# Patient Record
Sex: MALE | Race: ASIAN | NOT HISPANIC OR LATINO | Employment: FULL TIME | URBAN - METROPOLITAN AREA
[De-identification: names, ages, dates, MRNs, and addresses within clinical notes are randomized per-mention and may not be internally consistent; named-entity substitution may affect disease eponyms.]

---

## 2017-03-16 ENCOUNTER — OFFICE VISIT (OUTPATIENT)
Dept: VASCULAR LAB | Facility: MEDICAL CENTER | Age: 27
End: 2017-03-16
Attending: INTERNAL MEDICINE
Payer: COMMERCIAL

## 2017-03-16 VITALS
HEIGHT: 65 IN | HEART RATE: 65 BPM | BODY MASS INDEX: 45.32 KG/M2 | WEIGHT: 272 LBS | DIASTOLIC BLOOD PRESSURE: 73 MMHG | SYSTOLIC BLOOD PRESSURE: 130 MMHG

## 2017-03-16 DIAGNOSIS — G47.33 OSA (OBSTRUCTIVE SLEEP APNEA): ICD-10-CM

## 2017-03-16 DIAGNOSIS — I10 ESSENTIAL HYPERTENSION: ICD-10-CM

## 2017-03-16 DIAGNOSIS — M10.9 GOUT, UNSPECIFIED CAUSE, UNSPECIFIED CHRONICITY, UNSPECIFIED SITE: ICD-10-CM

## 2017-03-16 DIAGNOSIS — R73.01 IFG (IMPAIRED FASTING GLUCOSE): ICD-10-CM

## 2017-03-16 DIAGNOSIS — E78.5 DYSLIPIDEMIA: ICD-10-CM

## 2017-03-16 PROCEDURE — 99214 OFFICE O/P EST MOD 30 MIN: CPT | Performed by: INTERNAL MEDICINE

## 2017-03-16 PROCEDURE — 99212 OFFICE O/P EST SF 10 MIN: CPT

## 2017-03-16 RX ORDER — ATORVASTATIN CALCIUM 20 MG/1
TABLET, FILM COATED ORAL
Qty: 30 TAB | Refills: 11
Start: 2017-03-16 | End: 2017-03-19 | Stop reason: SDUPTHER

## 2017-03-16 ASSESSMENT — ENCOUNTER SYMPTOMS
BRUISES/BLEEDS EASILY: 0
FOCAL WEAKNESS: 0
MYALGIAS: 0
LOSS OF CONSCIOUSNESS: 0
PALPITATIONS: 0
MUSCULOSKELETAL NEGATIVE: 1
HEADACHES: 0
COUGH: 0
CLAUDICATION: 0
SHORTNESS OF BREATH: 0
SEIZURES: 0
DEPRESSION: 0

## 2017-03-16 NOTE — PROGRESS NOTES
Resistant Hypertension Follow Up Visit  March 16, 2017    Assessment / Plan:   1. Blood Pressure Control:  Office BP Goal Based on JNC8: <140/90  Not checking BP at home  Under good control in office  Under reasonable control on ABPM  - encouraged more home BP monitoring (large cuff)    2. Work up of Secondary Causes of Resistant Hypertension:   Renovascular HTN: Renal artery duplex with no evidence or MEGAN (2015), but may not be best test for potential FMD  Primary Aldosteronism: Excluded PRA/hilda 1.4/11.8 No adrenal adenoma noted on previous imaging  Thyroid Function: Excluded - TSH 1.19 1/2016  Obstructive Sleep Apnea: Postive - awaiting CPAP - encouraged to get started asap  Pheochromocytoma: Excluded 24 hour urine normal july 2014  Instrinsic renal disease - normal GFR and kidneys unremarkable appearing on sono  Coarctation - not seen on previous imaging/angiogram  Recommendations At This Visit:      - start cpap   - consider MRA renal arteries in future if BP becomes more difficult to control    3. Medication Use / Adherence:  Assessment: Completely compliant  Recommendations: Instructed to Continue Taking All Medications As Prescribed         4. End Organ Damage:   Left Ventricular Hypertrophy: Present on  Echocardiogram Date: 2015  Albuminuria: absent on Date: 1/25/2016 - 9.7 - recheck with next blood work  Renal Function: Chronic Kidney Disease  Stage 2 at worst (GFR >60)  Established Cardiovascular Disease: None    5. Lifestyle Recommendations:  Had been doing well previously, but considerable backsliding  Less exercise - been busy at work   Less attention to calorie restricted diet  Up about 25 pounds    6. Standard HTN Pharmacotherapy:  ACEI/ARB:  Consider adding ACE or ARB in future if BP not controlled  Thiazide Type Diuretic: Avoid given gout and hypokalemia  Calcium Channel Blocker (CCB): continue amlodipine 10 mg daily    7. Additional Agents:  Beta Blocker - continue carvedilol  Mineralocorticoid  Receptor Antagonist (MRA) - continue spironolactone 25 mg daily      8. Other CV Risk Factors:   Lipids - ASCVD risk >10%  LDLP and small LDLP well control  Total C a bit low  - decrease atorvastatin to 10 mg daily (1/2 tab)  - will follow lipid panel, liver function test in 6 months  Impaired Fasting Glucose - worsened with weight gain  - continue intensified TLC  - Follow sugar and A1C over time  - can consider metformin in the future (may also help LDL particle size and promote weight loss)    9. Other Issues:  Gout - no recent recurrence. 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 Ding.  LFTs elevated.  Is seeing GI. Has MRI ordered apparently.  Will defer all further w/u and management to GI and PCP    Studies Ordered At This Visit: None  Blood Work to Be Obtained Prior to Next Visit: As above prior to next visit    Follow Up: 6 months    Diagnosis:  1. Essential hypertension  MICROALBUMIN CREAT RATIO URINE    COMP METABOLIC PANEL    atorvastatin (LIPITOR) 20 MG Tab   2. Dyslipidemia  TSH    LIPOPROTEIN QT BLOOD BY NMR    COMP METABOLIC PANEL   3. Gout, unspecified cause, unspecified chronicity, unspecified site     4. GALILEA (obstructive sleep apnea)     5. IFG (impaired fasting glucose)  COMP METABOLIC PANEL    HEMOGLOBIN A1C        History of Present Illness:   Medina Gallo is a 25 y.o. male who was seen 3-10-16 for f/u of hypertension, dyslidipidemia, IFG, gout and GALILEA    Not checking BP out of office  Says has been fine at other office visits  Tolerating all medications  Good adherence  No stimulants, nsaids or other interfering substances  No myalgias on statin  Was diagnosed with galilea, but not yet started on cpap.  Less adherence with lifestyle mod  Never been on oral hypoglyecmics  No gout flares  Seeing GI regarding liver lesions - has mri  "scheduled for later this week    SOCIAL HISTORY  Diet - far less consistent  Weight - up 25 pounds  Exercise - no longer working with   No smoking      Review of Systems:   Review of Systems   Constitutional: Positive for malaise/fatigue.   Respiratory: Negative for cough and shortness of breath.    Cardiovascular: Negative for chest pain, palpitations, claudication and leg swelling.   Musculoskeletal: Negative.  Negative for myalgias.   Neurological: Negative for focal weakness, seizures, loss of consciousness and headaches.   Endo/Heme/Allergies: Does not bruise/bleed easily.   Psychiatric/Behavioral: Negative for depression.        Objective:   Allergies:  Review of patient's allergies indicates no known allergies.    Filed Vitals:    03/16/17 1147   BP: 130/73   Pulse: 65   Height: 1.651 m (5' 5\")   Weight: 123.378 kg (272 lb)     Body mass index is 45.26 kg/(m^2).      Physical Exam   Constitutional: He is oriented to person, place, and time. No distress.   Cardiovascular: Normal rate, regular rhythm, normal heart sounds and intact distal pulses.    No murmur heard.  Pulmonary/Chest: Breath sounds normal. No respiratory distress. He has no wheezes. He has no rales.   Musculoskeletal: He exhibits no edema.   Neurological: He is alert and oriented to person, place, and time. No cranial nerve deficit. Coordination normal.   Skin: He is not diaphoretic.   Psychiatric: He has a normal mood and affect. His behavior is normal.   Vitals reviewed.       Accessory Clinical Findings:   Echocardiogram:  Results for orders placed or performed during the hospital encounter of 07/28/15   ECHOCARDIOGRAM COMP W/O CONT   Result Value Ref Range    Eject.Frac. MOD BP 59.37     Eject.Frac. MOD 4C 62.42     Eject.Frac. MOD 2C 60.22       Lab Results   Component Value Date    CHOLSTRLTOT 91* 01/25/2016    CHOLSTRLTOT 84* 12/12/2015    LDL 28 12/12/2015    HDL 32* 01/25/2016    HDL 30* 12/12/2015    TRIGLYCERIDE 127 01/25/2016 "    TRIGLYCERIDE 132 12/12/2015    LDLPART 471 01/25/2016    LDLPART 20.5 01/25/2016    SMLLDL 230 01/25/2016    LHDLPART 2.6* 01/25/2016    LVLDLPT 8.7* 01/25/2016    LDLCHOL 34 01/25/2016    HDLSIZE 8.8* 01/25/2016    VLDLSIZE 53.5* 01/25/2016    HDLPART 28.1* 01/25/2016    LPIRSCORE 74* 01/25/2016      Lab Results   Component Value Date    PROTHROMBTM 12.9 06/08/2015    INR 0.95 06/08/2015       Lab Results   Component Value Date    HBA1C 5.2 12/12/2015      Lab Results   Component Value Date    SODIUM 134* 01/25/2016    POTASSIUM 3.8 01/25/2016    CHLORIDE 102 01/25/2016    CO2 23 01/25/2016    GLUCOSE 113* 01/25/2016    BUN 17 01/25/2016    CREATININE 1.15 01/25/2016     Lab Results   Component Value Date    ALDOSTERONE 11.8 01/25/2016      Lab Results   Component Value Date    URCREAT 121.10 01/25/2016    MICROALBUR 9.7 01/25/2016    MALBCRT 80* 01/25/2016    CTJDDLCH37H Not Applicable 07/28/2015    METANEPHUR 46 07/28/2015    METURVOL 43 07/28/2015     Lab Results   Component Value Date    STMTRPT  07/28/2015     CHECK PATIENT HISTORY - MISMATCH WITH PREVIOUS RACE    STMTRPT SINUS TACHYCARDIA [Remains] 07/28/2015    STMTRPT  07/28/2015     ABNORMAL T, CONSIDER ISCHEMIA, LATERAL LEADS [Now Present]    STMTRPT BORDERLINE PROLONGED QT INTERVAL [Insig. Chg.] 07/28/2015    STMTRPT SIGNIFICANT ECG CONTOUR CHANGES 07/28/2015     Multiple imaging studies and lab reports were available in EMR and reviewed at today's visit    Michael J Bloch, M.D.     Cc:   Syeda Elena

## 2017-03-19 DIAGNOSIS — I10 ESSENTIAL HYPERTENSION: ICD-10-CM

## 2017-03-21 RX ORDER — ATORVASTATIN CALCIUM 20 MG/1
TABLET, FILM COATED ORAL
Qty: 30 TAB | Refills: 11 | Status: SHIPPED | OUTPATIENT
Start: 2017-03-21 | End: 2018-05-04 | Stop reason: SDUPTHER

## 2017-05-28 DIAGNOSIS — I10 ESSENTIAL HYPERTENSION: ICD-10-CM

## 2017-05-30 RX ORDER — AMLODIPINE BESYLATE 10 MG/1
TABLET ORAL
Qty: 30 TAB | Refills: 12 | Status: SHIPPED | OUTPATIENT
Start: 2017-05-30 | End: 2018-07-25 | Stop reason: SDUPTHER

## 2017-09-09 ENCOUNTER — HOSPITAL ENCOUNTER (OUTPATIENT)
Dept: LAB | Facility: MEDICAL CENTER | Age: 27
End: 2017-09-09
Attending: INTERNAL MEDICINE
Payer: COMMERCIAL

## 2017-09-09 DIAGNOSIS — R73.01 IFG (IMPAIRED FASTING GLUCOSE): ICD-10-CM

## 2017-09-09 DIAGNOSIS — E78.5 DYSLIPIDEMIA: ICD-10-CM

## 2017-09-09 DIAGNOSIS — I10 ESSENTIAL HYPERTENSION: ICD-10-CM

## 2017-09-09 LAB
ALBUMIN SERPL BCP-MCNC: 4.3 G/DL (ref 3.2–4.9)
ALBUMIN/GLOB SERPL: 1.3 G/DL
ALP SERPL-CCNC: 45 U/L (ref 30–99)
ALT SERPL-CCNC: 45 U/L (ref 2–50)
ANION GAP SERPL CALC-SCNC: 10 MMOL/L (ref 0–11.9)
AST SERPL-CCNC: 49 U/L (ref 12–45)
BILIRUB SERPL-MCNC: 1.1 MG/DL (ref 0.1–1.5)
BUN SERPL-MCNC: 17 MG/DL (ref 8–22)
CALCIUM SERPL-MCNC: 9.4 MG/DL (ref 8.5–10.5)
CHLORIDE SERPL-SCNC: 103 MMOL/L (ref 96–112)
CO2 SERPL-SCNC: 23 MMOL/L (ref 20–33)
CREAT SERPL-MCNC: 1.12 MG/DL (ref 0.5–1.4)
CREAT UR-MCNC: 69.1 MG/DL
EST. AVERAGE GLUCOSE BLD GHB EST-MCNC: 143 MG/DL
GFR SERPL CREATININE-BSD FRML MDRD: >60 ML/MIN/1.73 M 2
GLOBULIN SER CALC-MCNC: 3.2 G/DL (ref 1.9–3.5)
GLUCOSE SERPL-MCNC: 120 MG/DL (ref 65–99)
HBA1C MFR BLD: 6.6 % (ref 0–5.6)
MICROALBUMIN UR-MCNC: 2.5 MG/DL
MICROALBUMIN/CREAT UR: 36 MG/G (ref 0–30)
POTASSIUM SERPL-SCNC: 3.9 MMOL/L (ref 3.6–5.5)
PROT SERPL-MCNC: 7.5 G/DL (ref 6–8.2)
SODIUM SERPL-SCNC: 136 MMOL/L (ref 135–145)
TSH SERPL DL<=0.005 MIU/L-ACNC: 0.94 UIU/ML (ref 0.3–3.7)

## 2017-09-09 PROCEDURE — 82570 ASSAY OF URINE CREATININE: CPT

## 2017-09-09 PROCEDURE — 80061 LIPID PANEL: CPT

## 2017-09-09 PROCEDURE — 84443 ASSAY THYROID STIM HORMONE: CPT

## 2017-09-09 PROCEDURE — 80053 COMPREHEN METABOLIC PANEL: CPT

## 2017-09-09 PROCEDURE — 82043 UR ALBUMIN QUANTITATIVE: CPT

## 2017-09-09 PROCEDURE — 36415 COLL VENOUS BLD VENIPUNCTURE: CPT

## 2017-09-09 PROCEDURE — 83704 LIPOPROTEIN BLD QUAN PART: CPT

## 2017-09-09 PROCEDURE — 83036 HEMOGLOBIN GLYCOSYLATED A1C: CPT

## 2017-09-14 LAB
CHOLEST SERPL-MCNC: 95 MG/DL
HDL PARTICAL NO Q4363: ABNORMAL
HDL SIZE Q4361: ABNORMAL NM
HDLC SERPL-MCNC: 27 MG/DL (ref 40–59)
HLD.LARGE SERPL-SCNC: ABNORMAL UMOL/L
L VLDL PART NO Q4357: ABNORMAL NMOL/L
LDL SERPL QN: ABNORMAL NM
LDL SERPL-SCNC: ABNORMAL NMOL/L
LDL SMALL SERPL-SCNC: ABNORMAL NMOL/L
LDLC SERPL CALC-MCNC: 34 MG/DL
TRIGL SERPL-MCNC: 168 MG/DL (ref 30–149)
VLDL SIZE Q4362: ABNORMAL

## 2017-09-15 ENCOUNTER — OFFICE VISIT (OUTPATIENT)
Dept: VASCULAR LAB | Facility: MEDICAL CENTER | Age: 27
End: 2017-09-15
Attending: INTERNAL MEDICINE
Payer: COMMERCIAL

## 2017-09-15 VITALS
HEIGHT: 65 IN | BODY MASS INDEX: 45.82 KG/M2 | HEART RATE: 69 BPM | WEIGHT: 275 LBS | SYSTOLIC BLOOD PRESSURE: 129 MMHG | DIASTOLIC BLOOD PRESSURE: 87 MMHG

## 2017-09-15 DIAGNOSIS — E11.9 TYPE 2 DIABETES MELLITUS WITHOUT COMPLICATION, WITHOUT LONG-TERM CURRENT USE OF INSULIN (HCC): ICD-10-CM

## 2017-09-15 DIAGNOSIS — I10 ESSENTIAL HYPERTENSION: ICD-10-CM

## 2017-09-15 DIAGNOSIS — E78.5 DYSLIPIDEMIA: ICD-10-CM

## 2017-09-15 PROCEDURE — 99212 OFFICE O/P EST SF 10 MIN: CPT | Performed by: NURSE PRACTITIONER

## 2017-09-15 PROCEDURE — 99213 OFFICE O/P EST LOW 20 MIN: CPT | Performed by: NURSE PRACTITIONER

## 2017-09-15 ASSESSMENT — ENCOUNTER SYMPTOMS
MYALGIAS: 0
COUGH: 0
LOSS OF CONSCIOUSNESS: 0
WEIGHT LOSS: 0
CLAUDICATION: 0
PALPITATIONS: 0
SHORTNESS OF BREATH: 0
NERVOUS/ANXIOUS: 0
DEPRESSION: 0
WHEEZING: 0
FOCAL WEAKNESS: 0
BRUISES/BLEEDS EASILY: 0
HEADACHES: 0

## 2017-09-15 NOTE — PROGRESS NOTES
" Resistant Hypertension Follow Up Visit  9/15/17    Assessment / Plan:   1. Blood Pressure Control:  Office BP Goal Based on JNC8: <140/90  Not checking BP at home  Under good control in office  Under reasonable control on ABPM  - encouraged more home BP monitoring (large cuff); states he will try and take it, \"at grocery stores.\"    2. Work up of Secondary Causes of Resistant Hypertension:   Renovascular HTN: Renal artery duplex with no evidence or MEGAN (2015), but may not be best test for potential FMD  Primary Aldosteronism: Excluded PRA/hilda 1.4/11.8 No adrenal adenoma noted on previous imaging  Thyroid Function: Excluded - TSH WNL 9/9/17  Obstructive Sleep Apnea: Postive - awaiting CPAP - has yet to get machine; pt states he may have to , \"repeat,\" sleep study  Pheochromocytoma: Excluded 24 hour urine normal July 2014  Instrinsic renal disease - normal GFR and kidneys unremarkable appearing on sono  Coarctation - not seen on previous imaging/angiogram  Recommendations At This Visit:      - start cpap   - consider MRA renal arteries in future if BP becomes more difficult to control    3. Medication Use / Adherence:  Assessment: Completely compliant  Recommendations: Instructed to Continue Taking All Medications As Prescribed- with the exception of adding metformin.         4. End Organ Damage:   Left Ventricular Hypertrophy: Present on  Echocardiogram Date: 2015  Albuminuria: improved 9/9/17: 36  Renal Function: Chronic Kidney Disease  Stage 2 at worst (GFR >60)  Established Cardiovascular Disease: None    5. Lifestyle Recommendations:  Had been doing well previously, but considerable backsliding  Less exercise - been busy at work, but joined a gym this month  Less attention to calorie restricted diet; less simple carbs  Up about 25 pounds over the last year    6. Standard HTN Pharmacotherapy:  ACEI/ARB:  Consider adding ACE or ARB in future if BP not controlled  Thiazide Type Diuretic: Avoid given gout and " hypokalemia  Calcium Channel Blocker (CCB): continue amlodipine 10 mg daily    7. Additional Agents:  Beta Blocker - continue carvedilol  Mineralocorticoid Receptor Antagonist (MRA) - continue spironolactone 25 mg daily      8. Other CV Risk Factors:   Lipids - ASCVD risk >10%  LDLP and small LDLP well control  Total C still low, but improved  - continue atorvastatin to 10 mg daily (1/2 tab)  - will follow lipid panel, liver function test in 6 months    Impaired Fasting Glucose, diabetic per A1C guidelines: 6.6- worsened with weight gain  - continue intensified TLC  - start metformin 500mg daily x1wk, then 500mg BID- test A1C, CMP in 3-6mo (may also promote wt loss and be beneficial for LDL particle size)    9. Other Issues:  Gout - no recent recurrence. avoid thiazide and loop diuretics - otherwise defer management to PCP  GALILEA -start cpap! 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 Ding.  LFTs previously elevated, now normal. Will defer all further w/u and management to GI and PCP    Studies Ordered At This Visit: None  Blood Work to Be Obtained Prior to Next Visit:  A1C, urine MA, CMP, NMR    Follow Up: 6 months    Diagnosis:  1. Type 2 diabetes mellitus without complication, without long-term current use of insulin (CMS-[facility])  metformin (GLUCOPHAGE) 500 MG Tab    HEMOGLOBIN A1C    MICROALBUMIN CREAT RATIO URINE   2. Dyslipidemia  LIPIDS, TOTAL, SERUM    NMR LIPO PROFILE   3. Essential hypertension  COMP METABOLIC PANEL    MICROALBUMIN CREAT RATIO URINE        History of Present Illness:   Medina Gallo is a 27 y.o. male who was seen 9/15/17 for f/u of hypertension, dyslidipidemia, IFG/DMII, gout and GALILEA    Not checking BP out of office  Says has been fine at other office visits and grocery stores  Tolerating all medications  Good adherence  No stimulants, nsaids or other interfering substances  No myalgias on statin  Was diagnosed  "with morgan, but not yet started on cpap- had f/u with pulm last wk.  May have to repeat sleep study  Less adherence with lifestyle mod, but motivated to lose wt- joined a gym this mo  Never been on oral hypoglyecmics; metformin started this visit  No gout flares  Seeing GI regarding liver lesions     SOCIAL HISTORY  Diet - far less consistent.  Will try low carb, low sugar  Weight - up 25 pounds over the last yr  Exercise - joined a gym, has been doing cardio/wt lifting 3x/wk  No smoking      Review of Systems:   Review of Systems   Constitutional: Positive for malaise/fatigue. Negative for weight loss.   Respiratory: Negative for cough, shortness of breath and wheezing.    Cardiovascular: Negative for chest pain, palpitations, claudication and leg swelling.   Musculoskeletal: Negative for myalgias.   Neurological: Negative for focal weakness, loss of consciousness and headaches.   Endo/Heme/Allergies: Does not bruise/bleed easily.   Psychiatric/Behavioral: Negative for depression. The patient is not nervous/anxious.         Objective:   Allergies:  Review of patient's allergies indicates no known allergies.    Vitals:    09/15/17 0824   BP: 129/87   Pulse: 69   Weight: 124.7 kg (275 lb)   Height: 1.651 m (5' 5\")     Body mass index is 45.76 kg/m².      Physical Exam   Constitutional: He is oriented to person, place, and time. He appears well-developed and well-nourished. No distress.   Cardiovascular: Normal rate, regular rhythm, normal heart sounds and intact distal pulses.    No murmur heard.  Pulmonary/Chest: Effort normal and breath sounds normal. No respiratory distress. He has no wheezes.   Musculoskeletal: Normal range of motion. He exhibits no edema.   Neurological: He is alert and oriented to person, place, and time.   Skin: Skin is warm and dry. He is not diaphoretic.   Psychiatric: He has a normal mood and affect. His behavior is normal.   Vitals reviewed.       Accessory Clinical Findings: "   Echocardiogram:  Results for orders placed or performed during the hospital encounter of 07/28/15   ECHOCARDIOGRAM COMP W/O CONT   Result Value Ref Range    Eject.Frac. MOD BP 59.37     Eject.Frac. MOD 4C 62.42     Eject.Frac. MOD 2C 60.22       Lab Results   Component Value Date    CHOLSTRLTOT 95 09/09/2017    CHOLSTRLTOT 84 (L) 12/12/2015    LDL 28 12/12/2015    HDL 27 (L) 09/09/2017    HDL 30 (A) 12/12/2015    TRIGLYCERIDE 168 (H) 09/09/2017    TRIGLYCERIDE 132 12/12/2015    LDLPART See Note 09/09/2017    LDLPART See Note 09/09/2017    SMLLDL See Note 09/09/2017    LHDLPART See Note 09/09/2017    LVLDLPT See Note 09/09/2017    LDLCHOL 34 09/09/2017    HDLSIZE See Note 09/09/2017    VLDLSIZE  09/09/2017      Comment:      INTERPRETIVE INFORMATION: VLDL Particle Size, NMR  Percentiles in Reference Population:  25th       50th       75th  44.3       46.7       50.2      HDLPART  09/09/2017      Comment:      INTERPRETIVE INFORMATION: HDL Particle Number, NMR  Percentiles in Reference Population:  25th       50th       75th  29.7       33.0       36.8      LPIRSCORE 74 (H) 01/25/2016           Lab Results   Component Value Date    HBA1C 6.6 (H) 09/09/2017      Lab Results   Component Value Date    SODIUM 136 09/09/2017    POTASSIUM 3.9 09/09/2017    CHLORIDE 103 09/09/2017    CO2 23 09/09/2017    GLUCOSE 120 (H) 09/09/2017    BUN 17 09/09/2017    CREATININE 1.12 09/09/2017     Lab Results   Component Value Date    ALDOSTERONE 11.8 01/25/2016      Lab Results   Component Value Date    URCREAT 69.10 09/09/2017    MICROALBUR 2.5 09/09/2017    MALBCRT 36 (H) 09/09/2017        Multiple imaging studies and lab reports were available in EMR and reviewed at today's visit    TIMBO Jacques.     Cc:   TATYANA Naidu

## 2017-10-06 ENCOUNTER — SLEEP CENTER VISIT (OUTPATIENT)
Dept: SLEEP MEDICINE | Facility: MEDICAL CENTER | Age: 27
End: 2017-10-06
Payer: COMMERCIAL

## 2017-10-06 VITALS
TEMPERATURE: 97.1 F | RESPIRATION RATE: 16 BRPM | HEIGHT: 65 IN | WEIGHT: 270 LBS | DIASTOLIC BLOOD PRESSURE: 84 MMHG | SYSTOLIC BLOOD PRESSURE: 126 MMHG | HEART RATE: 65 BPM | BODY MASS INDEX: 44.98 KG/M2 | OXYGEN SATURATION: 96 %

## 2017-10-06 DIAGNOSIS — E11.9 TYPE 2 DIABETES MELLITUS WITHOUT COMPLICATION, WITHOUT LONG-TERM CURRENT USE OF INSULIN (HCC): ICD-10-CM

## 2017-10-06 DIAGNOSIS — I51.7 LVH (LEFT VENTRICULAR HYPERTROPHY): ICD-10-CM

## 2017-10-06 DIAGNOSIS — I10 ESSENTIAL HYPERTENSION: ICD-10-CM

## 2017-10-06 DIAGNOSIS — G47.33 OBSTRUCTIVE SLEEP APNEA: ICD-10-CM

## 2017-10-06 PROCEDURE — 99214 OFFICE O/P EST MOD 30 MIN: CPT | Performed by: FAMILY MEDICINE

## 2017-10-06 NOTE — PATIENT INSTRUCTIONS
Sleep Apnea   Sleep apnea is a sleep disorder characterized by abnormal pauses in breathing while you sleep. When your breathing pauses, the level of oxygen in your blood decreases. This causes you to move out of deep sleep and into light sleep. As a result, your quality of sleep is poor, and the system that carries your blood throughout your body (cardiovascular system) experiences stress. If sleep apnea remains untreated, the following conditions can develop:  · High blood pressure (hypertension).  · Coronary artery disease.  · Inability to achieve or maintain an erection (impotence).  · Impairment of your thought process (cognitive dysfunction).  There are three types of sleep apnea:  1. Obstructive sleep apnea--Pauses in breathing during sleep because of a blocked airway.  2. Central sleep apnea--Pauses in breathing during sleep because the area of the brain that controls your breathing does not send the correct signals to the muscles that control breathing.  3. Mixed sleep apnea--A combination of both obstructive and central sleep apnea.  RISK FACTORS  The following risk factors can increase your risk of developing sleep apnea:  · Being overweight.  · Smoking.  · Having narrow passages in your nose and throat.  · Being of older age.  · Being male.  · Alcohol use.  · Sedative and tranquilizer use.  · Ethnicity. Among individuals younger than 35 years,  Americans are at increased risk of sleep apnea.  SYMPTOMS   · Difficulty staying asleep.  · Daytime sleepiness and fatigue.  · Loss of energy.  · Irritability.  · Loud, heavy snoring.  · Morning headaches.  · Trouble concentrating.  · Forgetfulness.  · Decreased interest in sex.  · Unexplained sleepiness.  DIAGNOSIS   In order to diagnose sleep apnea, your caregiver will perform a physical examination. A sleep study done in the comfort of your own home may be appropriate if you are otherwise healthy. Your caregiver may also recommend that you spend the  "night in a sleep lab. In the sleep lab, several monitors record information about your heart, lungs, and brain while you sleep. Your leg and arm movements and blood oxygen level are also recorded.  TREATMENT  The following actions may help to resolve mild sleep apnea:  · Sleeping on your side.    · Using a decongestant if you have nasal congestion.    · Avoiding the use of depressants, including alcohol, sedatives, and narcotics.    · Losing weight and modifying your diet if you are overweight.  There also are devices and treatments to help open your airway:  · Oral appliances. These are custom-made mouthpieces that shift your lower jaw forward and slightly open your bite. This opens your airway.  · Devices that create positive airway pressure. This positive pressure \"splints\" your airway open to help you breathe better during sleep. The following devices create positive airway pressure:  ¨ Continuous positive airway pressure (CPAP) device. The CPAP device creates a continuous level of air pressure with an air pump. The air is delivered to your airway through a mask while you sleep. This continuous pressure keeps your airway open.  ¨ Nasal expiratory positive airway pressure (EPAP) device. The EPAP device creates positive air pressure as you exhale. The device consists of single-use valves, which are inserted into each nostril and held in place by adhesive. The valves create very little resistance when you inhale but create much more resistance when you exhale. That increased resistance creates the positive airway pressure. This positive pressure while you exhale keeps your airway open, making it easier to breath when you inhale again.  ¨ Bilevel positive airway pressure (BPAP) device. The BPAP device is used mainly in patients with central sleep apnea. This device is similar to the CPAP device because it also uses an air pump to deliver continuous air pressure through a mask. However, with the BPAP machine, the " pressure is set at two different levels. The pressure when you exhale is lower than the pressure when you inhale.  · Surgery. Typically, surgery is only done if you cannot comply with less invasive treatments or if the less invasive treatments do not improve your condition. Surgery involves removing excess tissue in your airway to create a wider passage way.     This information is not intended to replace advice given to you by your health care provider. Make sure you discuss any questions you have with your health care provider.     Document Released: 12/08/2003 Document Revised: 01/08/2016 Document Reviewed: 04/25/2013  [employer] Interactive Patient Education ©2016 [employer]

## 2017-10-06 NOTE — PROGRESS NOTES
[facility] Follow Up Note     Date: 10/6/2017 / Time: 8:48 PM    Patient ID:   Name:             Medina Gallo   YOB: 1990  Age:                 27 y.o.  male   MRN:               [id number]      Thank you for requesting a sleep medicine consultation on Medina Gallo at the sleep center. He presents today with the chief complaints of GALILEA  follow up. Home sleep study from April 2016 indicated an AHI of 35 and low oxygen of 77 %. Titration study indicated successful titration to CPAP 7cm with resultant AHI 2.4 and normal oximetry.     HISTORY OF PRESENT ILLNESS:       Pt is currently not on PAP therapy. He goes to sleep around 10-11 pm  And on the weekends around 2 lalitha wakes up around 5 am and on thr weekend at 9 am. He is getting about 6 hrs of sleep on a good night and about 4 hr of sleep on a bad night. The bad nights are about 1 per month. He continues to snore., witnessed apneas and has occasional EDS.             REVIEW OF SYSTEMS:       Constitutional: Denies fevers, Denies weight changes  Eyes: Denies changes in vision, no eye pain  Ears/Nose/Throat/Mouth: Denies nasal congestion or sore throat   Cardiovascular: Denies chest pain or palpitations   Respiratory: Denies shortness of breath , Denies cough  Gastrointestinal/Hepatic: Denies abdominal pain, nausea, vomiting, diarrhea, constipation or GI bleeding   Genitourinary: Denies bladder dysfunction, dysuria or frequency  Musculoskeletal/Rheum: Denies  joint pain and swelling   Skin/Breast: Denies rash,   Neurological: Denies headache, confusion, memory loss or focal weakness/parasthesias  Psychiatric: denies mood disorder     Comprehensive review of systems form is reviewed with the patient and is attached in the EMR.     PMH:  has a past medical history of Dyslipidemia (12/2/2015); Fatty liver; Gout; Hypertension (2015); Liver mass; and Obese. He also has no past medical history of Allergy.  MEDS:   Current  "Outpatient Prescriptions:   •  metformin (GLUCOPHAGE) 500 MG Tab, Take 1 tab po q hs x 1wk, then 1 tab po BID, Disp: 60 Tab, Rfl: 6  •  amlodipine (NORVASC) 10 MG Tab, Take one tablet by mouth one time daily, Disp: 30 Tab, Rfl: 12  •  spironolactone (ALDACTONE) 25 MG Tab, Take one tablet by mouth one time daily, Disp: 30 Tab, Rfl: 11  •  carvedilol (COREG) 25 MG Tab, Take one tablet by mouth twice daily with meals, Disp: 60 Tab, Rfl: 11  •  atorvastatin (LIPITOR) 20 MG Tab, Take one tablet by mouth at bedtime, Disp: 30 Tab, Rfl: 11  ALLERGIES: No Known Allergies  SURGHX:   Past Surgical History:   Procedure Laterality Date   • HEPATIC ABLATION LAPAROSCOPIC  5/9/2013    Performed by Shai Ding M.D. at SURGERY [facility] ORS   • HEPATIC ABLATION LAPAROSCOPIC  11/19/2012    Performed by Shai Ding M.D. at SURGERY [facility] ORS   • RECOVERY  11/15/2012    Performed by [facility] at SURGERY SAME DAY [facility] ORS   • LIVER BIOPSY  11/9/12 and 11/15/12     SOCHX:  reports that he has never smoked. He has never used smokeless tobacco. He reports that he drinks alcohol. He reports that he does not use drugs..   FH:   Family History   Problem Relation Age of Onset   • Hypertension Mother    • Heart Disease Father    • Hypertension Father    • Hypertension Sister    • Heart Disease Sister          Physical Exam:  Vitals/ General Appearance:   Weight/BMI: Body mass index is 44.93 kg/m².  Blood pressure 126/84, pulse 65, temperature 36.2 °C (97.1 °F), resp. rate 16, height 1.651 m (5' 5\"), weight 122.5 kg (270 lb), SpO2 96 %.  Vitals:    10/06/17 0928   BP: 126/84   Pulse: 65   Resp: 16   Temp: 36.2 °C (97.1 °F)   SpO2: 96%   Weight: 122.5 kg (270 lb)   Height: 1.651 m (5' 5\")       Pt. is alert and oriented to time, place and person. Cooperative and in no apparent distress.       1. Head: Atraumatic, normocephalic.   2. Ears: Normal tympanic membrane and no discharge  3. Nose: No inferior " turbinate hypertophy, no septal deviation, no polyp.   4. Throat: Oropharynx appears crowded in that the palate is overhanging (Malam Kaye scale 4. uvula is large,   5. Neck: Supple. No thyromegaly  6. Chest: Trachea central, no spine deformity   7. Lungs auscultation: B/L good air entry, vesicular breath sounds, no adventitious sounds  8. Heart auscultation: 1st and 2nd heart sounds normal, regular rhythm. No appreciable murmur.  9. Abdomen: Soft, non tender, no organomegaly. Bowel sounds present  10. Extremities: No, no deformity, no clubbing, no pedal edema.  11. Skin: No rash  12. NEUROLOGICAL EXAMINATION: On neurological exam, the patient was alert and oriented x3. speech was clear and fluent without dysarthria. Motor exam revealed normal bulk, tone and strength 5/5, which was symmetrical in the upper and lower extremities bilaterally.        INVESTIGATIONS:     HST: Severe   obstructive sleep apnea was found. The AHI was 35.0, the AI was   1.6, and AHI was 33.5. The supine AHI was 45.7 and the nonsupine   AHI was 30.1. The obstructive apnea index was 1.3 and the central   apnea index was 0.2 the patient did not experience any   Cheyne-Roche respirations. The oxygen desaturation index was   32.1 the baseline saturation was 97%, the average saturation 91%,   and the lowest saturation was 77%. The oxygen saturation was less   than 90% for 34% of the recording    CPAP titration   AT CPAP 7 cm water the apnea hypopnea index was 2.4 events per hour with a   mean arterial oxygen saturation of 96% and a lowest saturation of   about 87%.  That step in the titration included 8 minutes of REM   sleep time and 4 hours and 24 minutes of non-REM sleep time and   also included time in the supine body position      ASSESSMENT AND PLAN     1.Obstructive Sleep Apnea (GALILEA).He  Is currently not on PAP therapy. The symptoms of excessive daytime, snoring and gasping persist.      The pathophysiology of GALILEA and the increased risk  of cardiovascular morbidity from untreated GALILEA is discussed in detail with the patient. He  also has HTN, heart disease which can be worsened by her GALILEA.     He is urged to avoid supine sleep, weight gain and alcoholic beverages since all of these can worsen GALILEA. He is cautioned against drowsy driving. If He feels sleepy while driving, He must pull over for a break/nap, rather than persist on the road, in the interest of He own safety and that of others on the road.   Plan   - Split night study is ordered since his study is more than a year old   -Previous HST and titration studies were reviewed and discussed with the pt    2. He also has HTN and DM. He is currently on metformin for the DM. Last A1C was 6.6 on 9/14/17. He is currently on amlodipine,carvedill and spironolactone for HTN. BP is well controlled on meds.    3. Regarding treatment of other past medical problems and general health maintenance,  He is urged to follow up with PCP.    F/U after split night study

## 2017-10-20 ENCOUNTER — OFFICE VISIT (OUTPATIENT)
Dept: URGENT CARE | Facility: CLINIC | Age: 27
End: 2017-10-20
Payer: COMMERCIAL

## 2017-10-20 VITALS
SYSTOLIC BLOOD PRESSURE: 132 MMHG | BODY MASS INDEX: 43.65 KG/M2 | DIASTOLIC BLOOD PRESSURE: 80 MMHG | WEIGHT: 262 LBS | HEART RATE: 65 BPM | TEMPERATURE: 97.6 F | OXYGEN SATURATION: 98 % | HEIGHT: 65 IN

## 2017-10-20 DIAGNOSIS — M10.9 ACUTE GOUT OF RIGHT ANKLE, UNSPECIFIED CAUSE: Primary | ICD-10-CM

## 2017-10-20 PROCEDURE — 99214 OFFICE O/P EST MOD 30 MIN: CPT | Performed by: NURSE PRACTITIONER

## 2017-10-20 RX ORDER — METHYLPREDNISOLONE 4 MG/1
4 TABLET ORAL DAILY
Qty: 1 KIT | Refills: 0 | Status: SHIPPED | OUTPATIENT
Start: 2017-10-20 | End: 2018-08-16

## 2017-10-20 ASSESSMENT — ENCOUNTER SYMPTOMS
FOCAL WEAKNESS: 0
NECK PAIN: 0
LOSS OF MOTION: 0
NAUSEA: 0
CHILLS: 0
NUMBNESS: 0
MYALGIAS: 0
INABILITY TO BEAR WEIGHT: 0
SENSORY CHANGE: 0
BACK PAIN: 0
MUSCLE WEAKNESS: 0
TINGLING: 0
VOMITING: 0
LOSS OF SENSATION: 0
FEVER: 0

## 2017-10-20 NOTE — PROGRESS NOTES
"Subjective:      Medina Gallo is a 27 y.o. male who presents with Foot Problem (X last night, Right foot pain, swelling, Hx of gout )            Medications, Allergies and Prior Medical Hx reviewed and updated in [facility].with patient/family today     Hx of gout, onset of right ankle pain this am, denies any trauma or precipitating incident.       Ankle Pain    The incident occurred 1 to 3 hours ago. The incident occurred at home. There was no injury mechanism. The pain is present in the right ankle. The quality of the pain is described as aching. The pain is at a severity of 6/10. The pain has been constant since onset. Pertinent negatives include no inability to bear weight, loss of motion, loss of sensation, muscle weakness, numbness or tingling. He reports no foreign bodies present. The symptoms are aggravated by movement, weight bearing and palpation. He has tried nothing for the symptoms. The treatment provided no relief.       Review of Systems   Constitutional: Negative for chills, fever and malaise/fatigue.   Gastrointestinal: Negative for nausea and vomiting.   Musculoskeletal: Positive for joint pain. Negative for back pain, myalgias and neck pain.   Skin: Negative for rash.   Neurological: Negative for tingling, sensory change, focal weakness and numbness.          Objective:     /80   Pulse 65   Temp 36.4 °C (97.6 °F)   Ht 1.651 m (5' 5\")   Wt 118.8 kg (262 lb)   SpO2 98%   BMI 43.60 kg/m²      Physical Exam   Constitutional: He appears well-developed and well-nourished. No distress.   HENT:   Head: Normocephalic and atraumatic.   Eyes: Conjunctivae are normal. Pupils are equal, round, and reactive to light.   Neck: Neck supple.   Cardiovascular: Normal rate.    Pulmonary/Chest: Effort normal. No respiratory distress.   Musculoskeletal:        Right ankle: He exhibits decreased range of motion and swelling. He exhibits no ecchymosis, no deformity, no laceration and normal pulse. " Tenderness. Medial malleolus tenderness found.        Feet:    Mild erythema and swelling, ttp over the medial malleolus    Neurological: He is alert.   Awake, alert, answering questions appropriately, moving all extremeties   Skin: Skin is warm and dry. No erythema.   Psychiatric: He has a normal mood and affect. His behavior is normal.   Vitals reviewed.              Assessment/Plan:       1. Acute gout of right ankle, unspecified cause  MethylPREDNISolone (MEDROL DOSEPAK) 4 MG Tablet Therapy Pack         Rest, Ice, Elevation, Ibuprofen,  Pt will go to the ER for worsening or changing symptoms as discussed,  Follow-up with your primary care provider or return here if not improving in 7 days   Discharge instructions discussed with pt/family who verbalize understanding and agreement with poc

## 2017-10-27 ENCOUNTER — SLEEP STUDY (OUTPATIENT)
Dept: SLEEP MEDICINE | Facility: MEDICAL CENTER | Age: 27
End: 2017-10-27
Attending: FAMILY MEDICINE
Payer: COMMERCIAL

## 2017-10-27 DIAGNOSIS — I51.7 LVH (LEFT VENTRICULAR HYPERTROPHY): ICD-10-CM

## 2017-10-27 DIAGNOSIS — I10 ESSENTIAL HYPERTENSION: ICD-10-CM

## 2017-10-27 DIAGNOSIS — G47.33 OBSTRUCTIVE SLEEP APNEA: ICD-10-CM

## 2017-10-27 DIAGNOSIS — E11.9 TYPE 2 DIABETES MELLITUS WITHOUT COMPLICATION, WITHOUT LONG-TERM CURRENT USE OF INSULIN (HCC): ICD-10-CM

## 2017-10-27 PROCEDURE — 95811 POLYSOM 6/>YRS CPAP 4/> PARM: CPT | Performed by: INTERNAL MEDICINE

## 2017-10-28 ENCOUNTER — OFFICE VISIT (OUTPATIENT)
Dept: URGENT CARE | Facility: CLINIC | Age: 27
End: 2017-10-28
Payer: COMMERCIAL

## 2017-10-28 VITALS
OXYGEN SATURATION: 96 % | BODY MASS INDEX: 43.65 KG/M2 | TEMPERATURE: 98.2 F | HEIGHT: 65 IN | RESPIRATION RATE: 16 BRPM | HEART RATE: 81 BPM | DIASTOLIC BLOOD PRESSURE: 88 MMHG | SYSTOLIC BLOOD PRESSURE: 130 MMHG | WEIGHT: 262 LBS

## 2017-10-28 DIAGNOSIS — M10.00 ACUTE IDIOPATHIC GOUT, UNSPECIFIED SITE: ICD-10-CM

## 2017-10-28 PROCEDURE — 99214 OFFICE O/P EST MOD 30 MIN: CPT | Performed by: PHYSICIAN ASSISTANT

## 2017-10-28 RX ORDER — DEXAMETHASONE 4 MG/1
8 TABLET ORAL DAILY
Qty: 6 TAB | Refills: 2 | Status: SHIPPED | OUTPATIENT
Start: 2017-10-28 | End: 2017-10-31

## 2017-10-28 ASSESSMENT — ENCOUNTER SYMPTOMS
JOINT SWELLING: 1
NUMBNESS: 0
SENSORY CHANGE: 0
FOCAL WEAKNESS: 1
WEAKNESS: 1

## 2017-10-28 NOTE — PROGRESS NOTES
Subjective:      Medina Gallo is a 27 y.o. male who presents with No chief complaint on file.            Other   This is a recurrent problem. The current episode started yesterday (gout sx). The problem occurs constantly. The problem has been unchanged. Associated symptoms include joint swelling and weakness. Pertinent negatives include no numbness. The symptoms are aggravated by bending and walking. He has tried rest for the symptoms. The treatment provided no relief.       Review of Systems   Musculoskeletal: Positive for joint pain and joint swelling.   Skin: Negative.    Neurological: Positive for focal weakness and weakness. Negative for sensory change and numbness.          Objective:     There were no vitals taken for this visit.     Physical Exam   Constitutional: He is oriented to person, place, and time. He appears well-developed and well-nourished. No distress.   Musculoskeletal: He exhibits edema and tenderness (mild r gr toe mtp tend/swell; pn dorsal foot/tib-talar area).   Neurological: He is alert and oriented to person, place, and time. No sensory deficit. He exhibits abnormal muscle tone. Gait abnormal. Coordination normal.   Skin: Skin is warm and dry. There is erythema.   Psychiatric: He has a normal mood and affect. His behavior is normal. Thought content normal.   Nursing note and vitals reviewed.              Assessment/Plan:     There are no diagnoses linked to this encounter.

## 2017-10-30 NOTE — PROCEDURES
CLINICAL COMMENTS:  The patient underwent a split night polysomnogram with a CPAP titration using the standard montage for measurement of parameters of sleep, respiratory events, movement abnormalities, heart rate and rhythm. A microphone was used to monitor snoring.    ANALYSIS: Testing began at 8:07pm.  The diagnostic recording time was 114.7 minutes with a sleep period of 110.1 minutes.  Total sleep time was 98.5 minutes with a sleep efficiency of 85.8%.  The sleep latency was 4.6 minutes, and REM latency was N/A minutes.  The patient had 22 arousals in total, for an arousal index of 13.4.        RESPIRATORY: The patient had 1 apneas in total.  Of these, 1 were obstructive apneas, and 0 were central apneas.  This resulted in an apnea index (AI) of 0.6.  The patient had 78 hypopneas in total, which resulted in a hypopnea index of 47.5.  The overall AHI was 48.1, while the AHI during REM was N/A.  The supine AHI = N/A.    OXIMETRY: Oxygen saturation monitoring showed a mean SpO2 of 93.8% for the diagnostic part of the study, with a minimum oxygen saturation of 89.0%.  Oxygen saturations were below 89% for 0.0% of sleep time.    CARDIAC: The highest heart rate for the first part of the study was 94.0 beats per minute.  The average heart rate during sleep was 73.3 bpm, while the highest heart rate was 91.0 bpm.    LIMB MOVEMENTS: There were a total of 9 periodic limb movements during sleep, of which 2 were PLMS arousals.  This resulted in a PLMS index of 5.5 and a PLMS arousal index of 1.2.    TREATMENT    Treatment recording time was 452.9 minutes with a total sleep time of 389.5min.  The patient had an arousal index of 5.4.      RESPIRATORY: The patient had 0 obstructive apneas, 3 central apneas, and 52 hypopneas for an overall AHI was 8.5.    OXIMETRY: The mean SpO2 during treatment was 94.0%, with a minimum oxygen saturation of 81.0%.        Interpretation:    This is a split-night study.    There is  fragmentation of sleep with a moderate elevation in the arousal and awakening index.  No REM sleep time is recorded.  There are periodic limb movements but they do not significantly affect sleep continuity.  The apnea hypopnea index is 48.1 events per hour, consisting almost exclusively of hypopnea episodes with a single obstructive apnea.  No supine sleep time is recorded.  The lowest arterial oxygen saturation is 89% on room air but he spends about 6% of the diagnostic time with a saturation below 90%.    In the treatment phase of the study there is again mild fragmentation of sleep, characterized by increased wake after sleep onset time.  The occasional periodic limb movements persist but are associated with significant sleep fragmentation.  CPAP was adjusted across a pressure range of 5-12 cm water.  Hypopnea events were suppressed in non-REM sleep at low pressures but the higher pressures were required to suppress residual events during REM sleep.  Occasional sleep onset central apnea episodes were noted.  In the final pressure stage, the apnea hypopnea index was 2.6 events per hour with a lowest arterial oxygen saturation of 90% on room air.  That stage in the titration included 62 minutes of REM sleep time, 120 minutes of non-REM sleep time, and time in the supine body position.    Assessment:  Severe obstructive sleep apnea hypopnea with an apnea hypopnea index of 48.1 events per hour and a lowest arterial oxygen saturation of 89% on room air.  Fragmentation of sleep related in part to the breathing events and probably to laboratory effect as well.  There are periodic limb movements but they do not significantly affect sleep continuity.  There is an excellent response to CPAP therapy.    Recommendations:  CPAP at 12 cm water pressure.  He did best with a large Simplus fullface mask.

## 2017-11-06 ENCOUNTER — OFFICE VISIT (OUTPATIENT)
Dept: PULMONOLOGY | Facility: HOSPICE | Age: 27
End: 2017-11-06
Payer: COMMERCIAL

## 2017-11-06 VITALS
BODY MASS INDEX: 44.32 KG/M2 | WEIGHT: 266 LBS | SYSTOLIC BLOOD PRESSURE: 116 MMHG | OXYGEN SATURATION: 80 % | RESPIRATION RATE: 16 BRPM | HEART RATE: 94 BPM | HEIGHT: 65 IN | DIASTOLIC BLOOD PRESSURE: 74 MMHG

## 2017-11-06 DIAGNOSIS — G47.33 OBSTRUCTIVE SLEEP APNEA: ICD-10-CM

## 2017-11-06 DIAGNOSIS — I10 ESSENTIAL HYPERTENSION: ICD-10-CM

## 2017-11-06 PROCEDURE — 99213 OFFICE O/P EST LOW 20 MIN: CPT | Performed by: NURSE PRACTITIONER

## 2017-11-06 NOTE — PROGRESS NOTES
Chief Complaint   Patient presents with   • Apnea     Sleep Study Results       HPI:  Medina Gallo is a 27 y.o. year old male here today for follow-up on his obstructive sleep apnea. He has a history of severe sleep apnea with sleep study in 2015 indicating an AHI of 35 and a low 02 saturation of 77%. He was titrated to CPAP of 7 CM H20 with an AHI of 2.4 and normal oximetry. He was never started on CPAP therapy. He continues to snore, have witnessed apneas and non restorative sleep. He denies morning headaches. His BMI is 44.2. He has a history of Hypertension and is on medication. He was recommended a repeat sleep study to re qualify for CPAP. He underwent an in lab Polysomnogram 10/27/2017 which indicates evidence of severe obstructive sleep apnea with an AHI of 48.1 with a low 02 saturation of 88%. He was successfully titrated to CPAP of 12 CM H20 with a resultant AHI of 2.6 and a low 02 saturation of 90%. He had an hour of REM sleep on this setting.       Past Medical History:   Diagnosis Date   • Dyslipidemia 12/2/2015   • Fatty liver    • Gout    • Hypertension 2015   • Liver mass    • Obese        Past Surgical History:   Procedure Laterality Date   • HEPATIC ABLATION LAPAROSCOPIC  5/9/2013    Performed by Shai Ding M.D. at SURGERY [facility]   • HEPATIC ABLATION LAPAROSCOPIC  11/19/2012    Performed by Shai Ding M.D. at SURGERY [facility] ORS   • RECOVERY  11/15/2012    Performed by -Recovery Surgery at SURGERY SAME DAY [facility] ORS   • LIVER BIOPSY  11/9/12 and 11/15/12       Family History   Problem Relation Age of Onset   • Hypertension Mother    • Heart Disease Father    • Hypertension Father    • Hypertension Sister    • Heart Disease Sister        Social History     Social History   • Marital status: Single     Spouse name: N/A   • Number of children: N/A   • Years of education: N/A     Occupational History   • Not on file.     Social History Main  "Topics   • Smoking status: Never Smoker   • Smokeless tobacco: Never Used   • Alcohol use 0.0 oz/week      Comment: rarely   • Drug use: No   • Sexual activity: Not on file     Other Topics Concern   • Not on file     Social History Narrative   • No narrative on file     ROS:  Constitutional: Denies fevers, chills, sweats, weight loss  Eyes: Denies glasses, vision loss, pain, drainage, double vision  Ears/Nose/Mouth/Throat: Denies rhinitis, nasal congestion, ear ache, difficulty hearing, sore throat, persistent hoarseness, decayed teeth/toothache  Cardiovascular: Denies chest pain, tightness, palpitations, swelling in feet/legs, fainting, difficulty breathing when laying down  Respiratory: Denies shortness of breath, cough, sputum, wheezing, painful breathing, coughing up blood  GI: Denies heartburn, difficulty swallowing, nausea, vomiting, abdominal pain, diarrhea, constipation  : Denies frequent urination, painful urination  Integumentary: Denies rashes, lumps or color changes  MSK: Denies painful joints, sore muscles, and back pain.   Neurological: Denies frequent headaches, dizziness, weakness  Sleep: See HPI       Current Outpatient Prescriptions   Medication Sig Dispense Refill   • MethylPREDNISolone (MEDROL DOSEPAK) 4 MG Tablet Therapy Pack Take 1 Tab by mouth every day. 1 Kit 0   • metformin (GLUCOPHAGE) 500 MG Tab Take 1 tab po q hs x 1wk, then 1 tab po BID 60 Tab 6   • amlodipine (NORVASC) 10 MG Tab Take one tablet by mouth one time daily 30 Tab 12   • spironolactone (ALDACTONE) 25 MG Tab Take one tablet by mouth one time daily 30 Tab 11   • carvedilol (COREG) 25 MG Tab Take one tablet by mouth twice daily with meals 60 Tab 11   • atorvastatin (LIPITOR) 20 MG Tab Take one tablet by mouth at bedtime 30 Tab 11     No current facility-administered medications for this visit.        No Known Allergies    Blood pressure 116/74, pulse 94, resp. rate 16, height 1.651 m (5' 5\"), weight 120.7 kg (266 lb), SpO2 " (!) 80 %.    PE:   Appearance: Well developed, well nourished, no acute distress  Eyes: PERRL, EOM intact, sclera white, conjunctiva moist  Ears: no lesions or deformities  Hearing: grossly intact  Nose: no lesions or deformities  Oropharynx: tongue normal, posterior pharynx without erythema or exudate  Mallampati Classification: Class 4  Neck: supple, trachea midline, no masses   Respiratory effort: no intercostal retractions or use of accessory muscles  Lung auscultation: no rales, rhonchi or wheezes  Heart auscultation: no murmur rub or gallop  Extremities: no cyanosis or edema  Abdomen: soft ,non tender, no masses  Gait and Station: normal  Digits and nails: no clubbing, cyanosis, petechiae or nodes.  Cranial nerves: grossly intact  Skin: no rashes, lesions or ulcers noted  Orientation: Oriented to time, person and place  Mood and affect: mood and affect appropriate, normal interaction with examiner  Judgement: Intact          Assessment:  1. Obstructive sleep apnea     2. BMI 40.0-44.9, adult (CMS-[facility])     3. Essential hypertension           Plan:      1) Reviewed sleep study in detail. Discussed pathophysiology of sleep apnea and various treatment options in detail. He is amendable to a trial of airway pressurization. Order for CPAP at 12 CM H20. Handout provided on sleep apnea  2) Sleep hygiene discussed.   3) Weight loss recommended.   4) Follow up in 6-8 weeks with compliance card download, sooner if needed.

## 2017-11-06 NOTE — PATIENT INSTRUCTIONS
Sleep Apnea   Sleep apnea is a sleep disorder characterized by abnormal pauses in breathing while you sleep. When your breathing pauses, the level of oxygen in your blood decreases. This causes you to move out of deep sleep and into light sleep. As a result, your quality of sleep is poor, and the system that carries your blood throughout your body (cardiovascular system) experiences stress. If sleep apnea remains untreated, the following conditions can develop:  · High blood pressure (hypertension).  · Coronary artery disease.  · Inability to achieve or maintain an erection (impotence).  · Impairment of your thought process (cognitive dysfunction).  There are three types of sleep apnea:  1. Obstructive sleep apnea--Pauses in breathing during sleep because of a blocked airway.  2. Central sleep apnea--Pauses in breathing during sleep because the area of the brain that controls your breathing does not send the correct signals to the muscles that control breathing.  3. Mixed sleep apnea--A combination of both obstructive and central sleep apnea.  RISK FACTORS  The following risk factors can increase your risk of developing sleep apnea:  · Being overweight.  · Smoking.  · Having narrow passages in your nose and throat.  · Being of older age.  · Being male.  · Alcohol use.  · Sedative and tranquilizer use.  · Ethnicity. Among individuals younger than 35 years,  Americans are at increased risk of sleep apnea.  SYMPTOMS   · Difficulty staying asleep.  · Daytime sleepiness and fatigue.  · Loss of energy.  · Irritability.  · Loud, heavy snoring.  · Morning headaches.  · Trouble concentrating.  · Forgetfulness.  · Decreased interest in sex.  · Unexplained sleepiness.  DIAGNOSIS   In order to diagnose sleep apnea, your caregiver will perform a physical examination. A sleep study done in the comfort of your own home may be appropriate if you are otherwise healthy. Your caregiver may also recommend that you spend the  "night in a sleep lab. In the sleep lab, several monitors record information about your heart, lungs, and brain while you sleep. Your leg and arm movements and blood oxygen level are also recorded.  TREATMENT  The following actions may help to resolve mild sleep apnea:  · Sleeping on your side.    · Using a decongestant if you have nasal congestion.    · Avoiding the use of depressants, including alcohol, sedatives, and narcotics.    · Losing weight and modifying your diet if you are overweight.  There also are devices and treatments to help open your airway:  · Oral appliances. These are custom-made mouthpieces that shift your lower jaw forward and slightly open your bite. This opens your airway.  · Devices that create positive airway pressure. This positive pressure \"splints\" your airway open to help you breathe better during sleep. The following devices create positive airway pressure:  ¨ Continuous positive airway pressure (CPAP) device. The CPAP device creates a continuous level of air pressure with an air pump. The air is delivered to your airway through a mask while you sleep. This continuous pressure keeps your airway open.  ¨ Nasal expiratory positive airway pressure (EPAP) device. The EPAP device creates positive air pressure as you exhale. The device consists of single-use valves, which are inserted into each nostril and held in place by adhesive. The valves create very little resistance when you inhale but create much more resistance when you exhale. That increased resistance creates the positive airway pressure. This positive pressure while you exhale keeps your airway open, making it easier to breath when you inhale again.  ¨ Bilevel positive airway pressure (BPAP) device. The BPAP device is used mainly in patients with central sleep apnea. This device is similar to the CPAP device because it also uses an air pump to deliver continuous air pressure through a mask. However, with the BPAP machine, the " pressure is set at two different levels. The pressure when you exhale is lower than the pressure when you inhale.  · Surgery. Typically, surgery is only done if you cannot comply with less invasive treatments or if the less invasive treatments do not improve your condition. Surgery involves removing excess tissue in your airway to create a wider passage way.     This information is not intended to replace advice given to you by your health care provider. Make sure you discuss any questions you have with your health care provider.     Document Released: 12/08/2003 Document Revised: 01/08/2016 Document Reviewed: 04/25/2013  [employer] Interactive Patient Education ©2016 [employer]

## 2017-11-30 ENCOUNTER — TELEPHONE (OUTPATIENT)
Dept: SLEEP MEDICINE | Facility: MEDICAL CENTER | Age: 27
End: 2017-11-30

## 2017-11-30 NOTE — TELEPHONE ENCOUNTER
Received call from Bienvenido Nicole @ [employer] in [place], [place]  Phone [phone] ext [phone]  Fax [phone]  Re:  Gabriel Gallo  ID:  [id number]    Patient's insurance company has record of paying for a CPAP device a year ago and patient is not eligible.  Researched chart and advised that we have no record that the patient ever picked up the machine and/or began PAP therapy.    Bienvenido will f/up with insurance, contact patient and the previous DME company to clarify if indeed therapy was ever initiated.

## 2018-01-11 ENCOUNTER — OFFICE VISIT (OUTPATIENT)
Dept: URGENT CARE | Facility: CLINIC | Age: 28
End: 2018-01-11
Payer: COMMERCIAL

## 2018-01-11 VITALS
SYSTOLIC BLOOD PRESSURE: 122 MMHG | HEIGHT: 65 IN | BODY MASS INDEX: 43.99 KG/M2 | TEMPERATURE: 98 F | WEIGHT: 264 LBS | RESPIRATION RATE: 16 BRPM | HEART RATE: 74 BPM | OXYGEN SATURATION: 97 % | DIASTOLIC BLOOD PRESSURE: 80 MMHG

## 2018-01-11 DIAGNOSIS — M10.071 ACUTE IDIOPATHIC GOUT OF RIGHT FOOT: ICD-10-CM

## 2018-01-11 PROCEDURE — 99214 OFFICE O/P EST MOD 30 MIN: CPT | Performed by: NURSE PRACTITIONER

## 2018-01-11 RX ORDER — METHYLPREDNISOLONE 4 MG/1
TABLET ORAL
Qty: 1 KIT | Refills: 0 | Status: SHIPPED | OUTPATIENT
Start: 2018-01-11 | End: 2018-08-16

## 2018-01-11 ASSESSMENT — ENCOUNTER SYMPTOMS
WEAKNESS: 0
TINGLING: 1
MYALGIAS: 0
FOCAL WEAKNESS: 0
SENSORY CHANGE: 0
HEADACHES: 0
CHILLS: 0
FEVER: 0

## 2018-01-11 NOTE — PROGRESS NOTES
"Subjective:      Medina Gallo is a 27 y.o. male who presents with Foot Pain (x last night, Rt. foot pain, swelling and redness  \"had gout before\")            HPI New problem. 27 year old male with right foot pain since last night. Had worse pain this morning that has improved over time. Located in first metatarsal joint. No redness. Is not a beer drinker but eats red meat. 2 episodes of this in October as well. He has not taken any medication for this this morning. No fever, chills or other joint pain. No paresthesia or weakness.  Patient has no known allergies.  Current Outpatient Prescriptions on File Prior to Visit   Medication Sig Dispense Refill   • metformin (GLUCOPHAGE) 500 MG Tab Take 1 tab po q hs x 1wk, then 1 tab po BID 60 Tab 6   • amlodipine (NORVASC) 10 MG Tab Take one tablet by mouth one time daily 30 Tab 12   • spironolactone (ALDACTONE) 25 MG Tab Take one tablet by mouth one time daily 30 Tab 11   • carvedilol (COREG) 25 MG Tab Take one tablet by mouth twice daily with meals 60 Tab 11   • atorvastatin (LIPITOR) 20 MG Tab Take one tablet by mouth at bedtime 30 Tab 11   • MethylPREDNISolone (MEDROL DOSEPAK) 4 MG Tablet Therapy Pack Take 1 Tab by mouth every day. (Patient not taking: Reported on 1/11/2018) 1 Kit 0     No current facility-administered medications on file prior to visit.      Social History     Social History   • Marital status: Single     Spouse name: N/A   • Number of children: N/A   • Years of education: N/A     Occupational History   • Not on file.     Social History Main Topics   • Smoking status: Never Smoker   • Smokeless tobacco: Never Used   • Alcohol use 0.0 oz/week      Comment: rarely   • Drug use: No   • Sexual activity: Not on file     Other Topics Concern   • Not on file     Social History Narrative   • No narrative on file     family history includes Heart Disease in his father and sister; Hypertension in his father, mother, and sister.      Review of Systems " "  Constitutional: Negative for chills and fever.   Musculoskeletal: Positive for joint pain. Negative for myalgias.   Skin: Negative for itching and rash.   Neurological: Positive for tingling. Negative for sensory change, focal weakness, weakness and headaches.          Objective:     /80   Pulse 74   Temp 36.7 °C (98 °F)   Resp 16   Ht 1.651 m (5' 5\")   Wt 119.7 kg (264 lb)   SpO2 97%   BMI 43.93 kg/m²      Physical Exam   Constitutional: He is oriented to person, place, and time. He appears well-developed and well-nourished. No distress.   Cardiovascular: Normal rate, regular rhythm and normal heart sounds.    No murmur heard.  Pulmonary/Chest: Breath sounds normal. No respiratory distress.   Musculoskeletal: Normal range of motion.        Right foot: There is tenderness, bony tenderness and swelling.        Feet:    Neurological: He is alert and oriented to person, place, and time.   Skin: Skin is warm and dry. No erythema.   Nursing note and vitals reviewed.              Assessment/Plan:     1. Acute idiopathic gout of right foot  MethylPREDNISolone (MEDROL DOSEPAK) 4 MG Tablet Therapy Pack     Discussed his now multiple episodes. If this continues he may be candidate for preventive therapy.  Differential diagnosis, natural history, supportive care, and indications for immediate follow-up discussed at length.     "

## 2018-01-20 ENCOUNTER — TELEMEDICINE2 (OUTPATIENT)
Dept: URGENT CARE | Facility: CLINIC | Age: 28
End: 2018-01-20
Payer: COMMERCIAL

## 2018-01-20 DIAGNOSIS — M10.00 ACUTE IDIOPATHIC GOUT, UNSPECIFIED SITE: ICD-10-CM

## 2018-01-20 PROCEDURE — 99213 OFFICE O/P EST LOW 20 MIN: CPT | Performed by: NURSE PRACTITIONER

## 2018-01-20 RX ORDER — DEXAMETHASONE 4 MG/1
8 TABLET ORAL DAILY
Qty: 6 TAB | Refills: 0 | Status: SHIPPED | OUTPATIENT
Start: 2018-01-20 | End: 2018-01-23

## 2018-01-20 NOTE — PROGRESS NOTES
Subjective:      Medina Gallo is a 27 y.o. male who presents with Gout (x 1 week / )    Past Medical History:   Diagnosis Date   • Dyslipidemia 12/2/2015   • Fatty liver    • Gout    • Hypertension 2015   • Liver mass    • Obese      Social History     Social History   • Marital status: Single     Spouse name: N/A   • Number of children: N/A   • Years of education: N/A     Occupational History   • Not on file.     Social History Main Topics   • Smoking status: Never Smoker   • Smokeless tobacco: Never Used   • Alcohol use 0.0 oz/week      Comment: rarely   • Drug use: No   • Sexual activity: Not on file     Other Topics Concern   • Not on file     Social History Narrative   • No narrative on file     Family History   Problem Relation Age of Onset   • Hypertension Mother    • Heart Disease Father    • Hypertension Father    • Hypertension Sister    • Heart Disease Sister        Allergies: Patient has no known allergies.    Patient presents with complaint over the 1st MTP joint of the right foot. Started over the last week.  Patient states he has a history of gout.  During the last flare he took a regimen of dexamethasone which has states has helped more than anything else. He is requesting that today. He brought the pill bottle with him from the prior prescription.        Other   This is a recurrent problem. The current episode started in the past 7 days. The problem occurs constantly. The problem has been unchanged. Nothing aggravates the symptoms. He has tried NSAIDs and rest for the symptoms. The treatment provided no relief.       Review of Systems   Musculoskeletal:        Right foot pain and swelling   All other systems reviewed and are negative.         Objective:     There were no vitals taken for this visit.     Physical Exam   Constitutional: He is oriented to person, place, and time. He appears well-developed and well-nourished. No distress.   Musculoskeletal:        Feet:    Point tenderness  over the 1st MTP joint and redness/swelling.   Neurological: He is alert and oriented to person, place, and time.   Skin: Skin is warm and dry. Capillary refill takes less than 2 seconds. He is not diaphoretic.   Psychiatric: He has a normal mood and affect. His behavior is normal. Judgment and thought content normal.   Vitals reviewed.              Assessment/Plan:     1. Acute idiopathic gout, unspecified site    - dexamethasone (DECADRON) 4 MG Tab; Take 2 Tabs by mouth every day for 3 days.  Dispense: 6 Tab; Refill: 0  -Elevate  -rest  -crutches PRN (patient already has)   -follow up if symptoms persist or worsen

## 2018-01-30 ENCOUNTER — APPOINTMENT (OUTPATIENT)
Dept: SLEEP MEDICINE | Facility: MEDICAL CENTER | Age: 28
End: 2018-01-30
Payer: COMMERCIAL

## 2018-02-13 ENCOUNTER — APPOINTMENT (OUTPATIENT)
Dept: RADIOLOGY | Facility: MEDICAL CENTER | Age: 28
End: 2018-02-13
Attending: EMERGENCY MEDICINE
Payer: COMMERCIAL

## 2018-02-13 ENCOUNTER — HOSPITAL ENCOUNTER (EMERGENCY)
Facility: MEDICAL CENTER | Age: 28
End: 2018-02-13
Attending: EMERGENCY MEDICINE
Payer: COMMERCIAL

## 2018-02-13 VITALS
HEART RATE: 87 BPM | BODY MASS INDEX: 43.09 KG/M2 | SYSTOLIC BLOOD PRESSURE: 142 MMHG | HEIGHT: 65 IN | OXYGEN SATURATION: 99 % | RESPIRATION RATE: 16 BRPM | WEIGHT: 258.6 LBS | TEMPERATURE: 97.8 F | DIASTOLIC BLOOD PRESSURE: 78 MMHG

## 2018-02-13 DIAGNOSIS — R10.13 ACUTE EPIGASTRIC PAIN: ICD-10-CM

## 2018-02-13 LAB
ALBUMIN SERPL BCP-MCNC: 4.1 G/DL (ref 3.2–4.9)
ALBUMIN/GLOB SERPL: 1.4 G/DL
ALP SERPL-CCNC: 68 U/L (ref 30–99)
ALT SERPL-CCNC: 84 U/L (ref 2–50)
ANION GAP SERPL CALC-SCNC: 10 MMOL/L (ref 0–11.9)
APTT PPP: 31.4 SEC (ref 24.7–36)
AST SERPL-CCNC: 116 U/L (ref 12–45)
BASOPHILS # BLD AUTO: 0.3 % (ref 0–1.8)
BASOPHILS # BLD: 0.03 K/UL (ref 0–0.12)
BILIRUB SERPL-MCNC: 1.3 MG/DL (ref 0.1–1.5)
BNP SERPL-MCNC: 8 PG/ML (ref 0–100)
BUN SERPL-MCNC: 10 MG/DL (ref 8–22)
CALCIUM SERPL-MCNC: 9.6 MG/DL (ref 8.5–10.5)
CHLORIDE SERPL-SCNC: 104 MMOL/L (ref 96–112)
CO2 SERPL-SCNC: 24 MMOL/L (ref 20–33)
CREAT SERPL-MCNC: 1.07 MG/DL (ref 0.5–1.4)
DEPRECATED D DIMER PPP IA-ACNC: <200 NG/ML(D-DU)
EKG IMPRESSION: NORMAL
EOSINOPHIL # BLD AUTO: 0.19 K/UL (ref 0–0.51)
EOSINOPHIL NFR BLD: 1.9 % (ref 0–6.9)
ERYTHROCYTE [DISTWIDTH] IN BLOOD BY AUTOMATED COUNT: 39.5 FL (ref 35.9–50)
GLOBULIN SER CALC-MCNC: 2.9 G/DL (ref 1.9–3.5)
GLUCOSE SERPL-MCNC: 135 MG/DL (ref 65–99)
HCT VFR BLD AUTO: 43.1 % (ref 42–52)
HGB BLD-MCNC: 14.9 G/DL (ref 14–18)
IMM GRANULOCYTES # BLD AUTO: 0.1 K/UL (ref 0–0.11)
IMM GRANULOCYTES NFR BLD AUTO: 1 % (ref 0–0.9)
INR PPP: 0.97 (ref 0.87–1.13)
LIPASE SERPL-CCNC: 37 U/L (ref 11–82)
LYMPHOCYTES # BLD AUTO: 1.3 K/UL (ref 1–4.8)
LYMPHOCYTES NFR BLD: 13 % (ref 22–41)
MCH RBC QN AUTO: 30.2 PG (ref 27–33)
MCHC RBC AUTO-ENTMCNC: 34.6 G/DL (ref 33.7–35.3)
MCV RBC AUTO: 87.2 FL (ref 81.4–97.8)
MONOCYTES # BLD AUTO: 0.72 K/UL (ref 0–0.85)
MONOCYTES NFR BLD AUTO: 7.2 % (ref 0–13.4)
NEUTROPHILS # BLD AUTO: 7.65 K/UL (ref 1.82–7.42)
NEUTROPHILS NFR BLD: 76.6 % (ref 44–72)
NRBC # BLD AUTO: 0 K/UL
NRBC BLD-RTO: 0 /100 WBC
PLATELET # BLD AUTO: 255 K/UL (ref 164–446)
PMV BLD AUTO: 8.7 FL (ref 9–12.9)
POTASSIUM SERPL-SCNC: 3.7 MMOL/L (ref 3.6–5.5)
PROT SERPL-MCNC: 7 G/DL (ref 6–8.2)
PROTHROMBIN TIME: 12.6 SEC (ref 12–14.6)
RBC # BLD AUTO: 4.94 M/UL (ref 4.7–6.1)
SODIUM SERPL-SCNC: 138 MMOL/L (ref 135–145)
TROPONIN I SERPL-MCNC: <0.01 NG/ML (ref 0–0.04)
WBC # BLD AUTO: 10 K/UL (ref 4.8–10.8)

## 2018-02-13 PROCEDURE — 80053 COMPREHEN METABOLIC PANEL: CPT

## 2018-02-13 PROCEDURE — 85730 THROMBOPLASTIN TIME PARTIAL: CPT

## 2018-02-13 PROCEDURE — 85025 COMPLETE CBC W/AUTO DIFF WBC: CPT

## 2018-02-13 PROCEDURE — 85379 FIBRIN DEGRADATION QUANT: CPT

## 2018-02-13 PROCEDURE — 93005 ELECTROCARDIOGRAM TRACING: CPT

## 2018-02-13 PROCEDURE — A9270 NON-COVERED ITEM OR SERVICE: HCPCS | Performed by: EMERGENCY MEDICINE

## 2018-02-13 PROCEDURE — 83880 ASSAY OF NATRIURETIC PEPTIDE: CPT

## 2018-02-13 PROCEDURE — 93005 ELECTROCARDIOGRAM TRACING: CPT | Performed by: EMERGENCY MEDICINE

## 2018-02-13 PROCEDURE — 700102 HCHG RX REV CODE 250 W/ 637 OVERRIDE(OP): Performed by: EMERGENCY MEDICINE

## 2018-02-13 PROCEDURE — 85610 PROTHROMBIN TIME: CPT

## 2018-02-13 PROCEDURE — 99284 EMERGENCY DEPT VISIT MOD MDM: CPT

## 2018-02-13 PROCEDURE — 84484 ASSAY OF TROPONIN QUANT: CPT

## 2018-02-13 PROCEDURE — 71045 X-RAY EXAM CHEST 1 VIEW: CPT

## 2018-02-13 PROCEDURE — 83690 ASSAY OF LIPASE: CPT

## 2018-02-13 RX ORDER — OMEPRAZOLE 20 MG/1
20 CAPSULE, DELAYED RELEASE ORAL DAILY
Qty: 30 CAP | Refills: 0 | Status: SHIPPED | OUTPATIENT
Start: 2018-02-13 | End: 2019-06-20

## 2018-02-13 RX ADMIN — LIDOCAINE HYDROCHLORIDE 30 ML: 20 SOLUTION OROPHARYNGEAL at 09:50

## 2018-02-13 ASSESSMENT — PAIN SCALES - GENERAL
PAINLEVEL_OUTOF10: 0
PAINLEVEL_OUTOF10: 6

## 2018-02-13 ASSESSMENT — LIFESTYLE VARIABLES: DO YOU DRINK ALCOHOL: NO

## 2018-02-13 NOTE — DISCHARGE INSTRUCTIONS
Gastroesophageal Reflux Disease, Adult  Gastroesophageal reflux disease (GERD) happens when acid from your stomach flows up into the esophagus. When acid comes in contact with the esophagus, the acid causes soreness (inflammation) in the esophagus. Over time, GERD may create small holes (ulcers) in the lining of the esophagus.  CAUSES   · Increased body weight. This puts pressure on the stomach, making acid rise from the stomach into the esophagus.  · Smoking. This increases acid production in the stomach.  · Drinking alcohol. This causes decreased pressure in the lower esophageal sphincter (valve or ring of muscle between the esophagus and stomach), allowing acid from the stomach into the esophagus.  · Late evening meals and a full stomach. This increases pressure and acid production in the stomach.  · A malformed lower esophageal sphincter.  Sometimes, no cause is found.  SYMPTOMS   · Burning pain in the lower part of the mid-chest behind the breastbone and in the mid-stomach area. This may occur twice a week or more often.  · Trouble swallowing.  · Sore throat.  · Dry cough.  · Asthma-like symptoms including chest tightness, shortness of breath, or wheezing.  DIAGNOSIS   Your caregiver may be able to diagnose GERD based on your symptoms. In some cases, X-rays and other tests may be done to check for complications or to check the condition of your stomach and esophagus.  TREATMENT   Your caregiver may recommend over-the-counter or prescription medicines to help decrease acid production. Ask your caregiver before starting or adding any new medicines.   HOME CARE INSTRUCTIONS   · Change the factors that you can control. Ask your caregiver for guidance concerning weight loss, quitting smoking, and alcohol consumption.  · Avoid foods and drinks that make your symptoms worse, such as:  ¨ Caffeine or alcoholic drinks.  ¨ Chocolate.  ¨ Peppermint or mint flavorings.  ¨ Garlic and onions.  ¨ Spicy foods.  ¨ Citrus fruits,  such as oranges, daniel, or limes.  ¨ Tomato-based foods such as sauce, chili, salsa, and pizza.  ¨ Fried and fatty foods.  · Avoid lying down for the 3 hours prior to your bedtime or prior to taking a nap.  · Eat small, frequent meals instead of large meals.  · Wear loose-fitting clothing. Do not wear anything tight around your waist that causes pressure on your stomach.  · Raise the head of your bed 6 to 8 inches with wood blocks to help you sleep. Extra pillows will not help.  · Only take over-the-counter or prescription medicines for pain, discomfort, or fever as directed by your caregiver.  · Do not take aspirin, ibuprofen, or other nonsteroidal anti-inflammatory drugs (NSAIDs).  SEEK IMMEDIATE MEDICAL CARE IF:   · You have pain in your arms, neck, jaw, teeth, or back.  · Your pain increases or changes in intensity or duration.  · You develop nausea, vomiting, or sweating (diaphoresis).  · You develop shortness of breath, or you faint.  · Your vomit is green, yellow, black, or looks like coffee grounds or blood.  · Your stool is red, bloody, or black.  These symptoms could be signs of other problems, such as heart disease, gastric bleeding, or esophageal bleeding.  MAKE SURE YOU:   · Understand these instructions.  · Will watch your condition.  · Will get help right away if you are not doing well or get worse.     This information is not intended to replace advice given to you by your health care provider. Make sure you discuss any questions you have with your health care provider.     Document Released: 09/27/2006 Document Revised: 01/08/2016 Document Reviewed: 04/13/2016  [employer] Interactive Patient Education ©2016 [employer]

## 2018-02-13 NOTE — ED PROVIDER NOTES
ED Provider Note    CHIEF COMPLAINT  Chief Complaint   Patient presents with   • Chest Pain     sudden onset at 0430, constant, mid sternal chest pain, non radiating.   • Nausea     denies emesis       [facility]  Gabrielhellen Gallo is a 27 y.o. male who presents for evaluation of chest pain. Patient states he had sudden onset of chest pain at 4:30 this morning. It's epigastric and substernal in location. He states he mostly feels it in the epigastric region right now. He had no shortness of breath but states that he had some pain with inspiration. A little bit of nausea. He had no vomiting. We went to bed he states he felt fine. He's had no fevers or coughs. He's had no diaphoresis. The pain does not seem to radiate. He describes it as a crampy pressure sensation. Cardiac risk factors are negative for tobacco, positive for hypertension, positive for diabetes, positive for cholesterol, but he denies any personal history of coronary artery disease. He denies history of DVT or PE.    REVIEW OF SYSTEMS  See HPI for further details. All other systems are negative.     PAST MEDICAL HISTORY  Past Medical History:   Diagnosis Date   • Dyslipidemia 12/2/2015   • Fatty liver    • Gout    • Hypertension 2015   • Liver mass    • Obese        FAMILY HISTORY  Family History   Problem Relation Age of Onset   • Hypertension Mother    • Heart Disease Father    • Hypertension Father    • Hypertension Sister    • Heart Disease Sister        SOCIAL HISTORY  Social History     Social History   • Marital status: Single     Spouse name: N/A   • Number of children: N/A   • Years of education: N/A     Social History Main Topics   • Smoking status: Never Smoker   • Smokeless tobacco: Never Used   • Alcohol use 0.0 oz/week      Comment: rarely   • Drug use: No   • Sexual activity: Not on file     Other Topics Concern   • Not on file     Social History Narrative   • No narrative on file       SURGICAL HISTORY  Past Surgical History:  "  Procedure Laterality Date   • HEPATIC ABLATION LAPAROSCOPIC  5/9/2013    Performed by Shai Ding M.D. at SURGERY [facility] ORS   • HEPATIC ABLATION LAPAROSCOPIC  11/19/2012    Performed by Shai Ding M.D. at SURGERY [facility] ORS   • RECOVERY  11/15/2012    Performed by -Recovery Surgery at SURGERY SAME DAY [facility] ORS   • LIVER BIOPSY  11/9/12 and 11/15/12       CURRENT MEDICATIONS  Home Medications    **Home medications have not yet been reviewed for this encounter**         ALLERGIES  No Known Allergies    PHYSICAL EXAM  VITAL SIGNS: /83   Pulse 80   Temp 36.1 °C (97 °F)   Resp 18   Ht 1.651 m (5' 5\")   Wt 117.3 kg (258 lb 9.6 oz)   SpO2 99%   BMI 43.03 kg/m²     Constitutional: Well developed, Well nourished, No acute distress, Non-toxic appearance.   HENT: Normocephalic, Atraumatic.   Eyes:  EOMI, Conjunctiva normal, No discharge.   Cardiovascular: Normal heart rate, Normal rhythm, No murmurs, No rubs, No gallops.   Thorax & Lungs: Lungs clear to auscultation bilaterally without wheezes, rales or rhonchi. No respiratory distress. No chest tenderness.   Abdomen: Obese, soft, epigastric tenderness without guarding or rebound. No masses.  Skin: Warm, Dry.   Extremities:  No edema, No cyanosis. No calf tenderness or swelling.  Musculoskeletal: Good range of motion in all major joints.  Neurologic: Awake alert.     EKG  EKG Interpretation    Interpreted by emergency department physician    Rhythm: normal sinus   Rate: normal  Axis: normal  Ectopy: none  Conduction: normal  ST Segments: normal  T Waves: normal  Q Waves: none    Clinical Impression: no acute changes        RADIOLOGY/PROCEDURES  DX-CHEST-PORTABLE (1 VIEW)   Final Result      No acute cardiopulmonary process is seen.            COURSE & MEDICAL DECISION MAKING  Pertinent Labs & Imaging studies reviewed. (See chart for details)  This 27-year-old here for evaluation of epigastric and chest pain. His EKG is " unremarkable. Laboratories are obtained to include a troponin I which is negative. BNP is negative. Chemistries are notable for a glucose of 135 with slight elevation in AST at 116 and ALT of 84. Total bilirubin and lipase are normal. D-dimer is negative suggesting no pulmonary embolism. CBC is unremarkable. Chest x-ray shows no evidence for an acute cardiopulmonary process. I discussed results of all the studies with the patient. Based on his symptoms he is treated with a GI cocktail. On repeat evaluation he states the GI cocktail completely resolved his symptoms. At this point I believe his symptoms are related to dyspepsia and gastroesophageal reflux. I do not believe this is a primary cardiac or pulmonary issue. I will provide him a perception for [facility]. He will contact his primary care provider for follow-up. He is given a discharge instruction sheet on gastroesophageal reflux.    FINAL IMPRESSION  1. Epigastric abdominal pain  2. GERD  3. Hyperglycemia         Electronically signed by: Rich Crystal, 2/13/2018 8:23 AM

## 2018-02-13 NOTE — ED TRIAGE NOTES
Medina Gallo  27 y.o.  Chief Complaint   Patient presents with   • Chest Pain     sudden onset at 0430, constant, mid sternal chest pain, non radiating.   • Nausea     denies emesis     Patient denies cardiac hx.  No SOB, no diaphoresis.    Explained wait time and triage process to pt. Pt placed back out in lobby, told to notify ED tech or triage RN of any changes, verbalized understanding.

## 2018-02-14 ENCOUNTER — PATIENT OUTREACH (OUTPATIENT)
Dept: HEALTH INFORMATION MANAGEMENT | Facility: OTHER | Age: 28
End: 2018-02-14

## 2018-03-09 ENCOUNTER — HOSPITAL ENCOUNTER (OUTPATIENT)
Dept: LAB | Facility: MEDICAL CENTER | Age: 28
End: 2018-03-09
Attending: NURSE PRACTITIONER
Payer: COMMERCIAL

## 2018-03-09 DIAGNOSIS — E11.9 TYPE 2 DIABETES MELLITUS WITHOUT COMPLICATION, WITHOUT LONG-TERM CURRENT USE OF INSULIN (HCC): ICD-10-CM

## 2018-03-09 DIAGNOSIS — I10 ESSENTIAL HYPERTENSION: ICD-10-CM

## 2018-03-09 LAB
ALBUMIN SERPL BCP-MCNC: 4.8 G/DL (ref 3.2–4.9)
ALBUMIN/GLOB SERPL: 1.6 G/DL
ALP SERPL-CCNC: 50 U/L (ref 30–99)
ALT SERPL-CCNC: 30 U/L (ref 2–50)
ANION GAP SERPL CALC-SCNC: 10 MMOL/L (ref 0–11.9)
AST SERPL-CCNC: 25 U/L (ref 12–45)
BILIRUB SERPL-MCNC: 1 MG/DL (ref 0.1–1.5)
BUN SERPL-MCNC: 14 MG/DL (ref 8–22)
CALCIUM SERPL-MCNC: 9.6 MG/DL (ref 8.5–10.5)
CHLORIDE SERPL-SCNC: 100 MMOL/L (ref 96–112)
CHOLEST SERPL-MCNC: 92 MG/DL (ref 100–199)
CO2 SERPL-SCNC: 26 MMOL/L (ref 20–33)
CREAT SERPL-MCNC: 1.2 MG/DL (ref 0.5–1.4)
CREAT UR-MCNC: 19.2 MG/DL
EST. AVERAGE GLUCOSE BLD GHB EST-MCNC: 120 MG/DL
GLOBULIN SER CALC-MCNC: 3 G/DL (ref 1.9–3.5)
GLUCOSE SERPL-MCNC: 88 MG/DL (ref 65–99)
HBA1C MFR BLD: 5.8 % (ref 0–5.6)
HDLC SERPL-MCNC: 28 MG/DL
LDLC SERPL CALC-MCNC: 22 MG/DL
MICROALBUMIN UR-MCNC: <0.7 MG/DL
MICROALBUMIN/CREAT UR: NORMAL MG/G (ref 0–30)
POTASSIUM SERPL-SCNC: 3.6 MMOL/L (ref 3.6–5.5)
PROT SERPL-MCNC: 7.8 G/DL (ref 6–8.2)
SODIUM SERPL-SCNC: 136 MMOL/L (ref 135–145)
TRIGL SERPL-MCNC: 209 MG/DL (ref 0–149)

## 2018-03-09 PROCEDURE — 83036 HEMOGLOBIN GLYCOSYLATED A1C: CPT

## 2018-03-09 PROCEDURE — 36415 COLL VENOUS BLD VENIPUNCTURE: CPT

## 2018-03-09 PROCEDURE — 80053 COMPREHEN METABOLIC PANEL: CPT

## 2018-03-09 PROCEDURE — 82043 UR ALBUMIN QUANTITATIVE: CPT

## 2018-03-09 PROCEDURE — 80061 LIPID PANEL: CPT

## 2018-03-09 PROCEDURE — 82570 ASSAY OF URINE CREATININE: CPT

## 2018-05-04 DIAGNOSIS — I10 ESSENTIAL HYPERTENSION: ICD-10-CM

## 2018-05-07 RX ORDER — ATORVASTATIN CALCIUM 20 MG/1
TABLET, FILM COATED ORAL
Qty: 30 TAB | Refills: 11 | Status: SHIPPED | OUTPATIENT
Start: 2018-05-07 | End: 2018-12-13

## 2018-06-02 DIAGNOSIS — I10 ESSENTIAL HYPERTENSION: ICD-10-CM

## 2018-06-04 RX ORDER — AMLODIPINE BESYLATE 10 MG/1
TABLET ORAL
Qty: 30 TAB | Refills: 0 | OUTPATIENT
Start: 2018-06-04

## 2018-06-13 DIAGNOSIS — I10 ESSENTIAL HYPERTENSION: ICD-10-CM

## 2018-06-13 DIAGNOSIS — E11.9 TYPE 2 DIABETES MELLITUS WITHOUT COMPLICATION, WITHOUT LONG-TERM CURRENT USE OF INSULIN (HCC): ICD-10-CM

## 2018-06-14 RX ORDER — SPIRONOLACTONE 25 MG/1
TABLET ORAL
Qty: 30 TAB | Refills: 11 | Status: SHIPPED | OUTPATIENT
Start: 2018-06-14 | End: 2019-07-29 | Stop reason: SDUPTHER

## 2018-07-13 DIAGNOSIS — I10 ESSENTIAL HYPERTENSION: ICD-10-CM

## 2018-07-16 RX ORDER — AMLODIPINE BESYLATE 10 MG/1
TABLET ORAL
Qty: 30 TAB | OUTPATIENT
Start: 2018-07-16

## 2018-07-25 DIAGNOSIS — I10 ESSENTIAL HYPERTENSION: ICD-10-CM

## 2018-07-25 NOTE — LETTER
July 26, 2018        [facility]  [address] [address]        Dear Medina:    This letter is to inform you the you are due for an annual appointment. Please contact our scheduling department at [phone] To schedule       If you have any questions or concerns, please don't hesitate to call.        Sincerely,        SUKHJINDER Gr.    Electronically Signed

## 2018-07-26 RX ORDER — AMLODIPINE BESYLATE 10 MG/1
TABLET ORAL
Qty: 30 TAB | Refills: 0 | Status: SHIPPED | OUTPATIENT
Start: 2018-07-26 | End: 2018-09-26 | Stop reason: SDUPTHER

## 2018-07-26 NOTE — TELEPHONE ENCOUNTER
Lizzeth's patient.  Refill done. Patient is due for annual appointment. Please have patient schedule.  Virgil King M.D.

## 2018-07-26 NOTE — TELEPHONE ENCOUNTER
Was the patient seen in the last year in this department? Yes    Does patient have an active prescription for medications requested? no    Received Request Via: Pharmacy

## 2018-08-16 DIAGNOSIS — Z01.812 PRE-OPERATIVE LABORATORY EXAMINATION: ICD-10-CM

## 2018-08-16 DIAGNOSIS — Z01.810 PRE-OPERATIVE CARDIOVASCULAR EXAMINATION: ICD-10-CM

## 2018-08-16 LAB
EKG IMPRESSION: NORMAL
ERYTHROCYTE [DISTWIDTH] IN BLOOD BY AUTOMATED COUNT: 39.7 FL (ref 35.9–50)
HCT VFR BLD AUTO: 51.2 % (ref 42–52)
HGB BLD-MCNC: 17.2 G/DL (ref 14–18)
MCH RBC QN AUTO: 29.3 PG (ref 27–33)
MCHC RBC AUTO-ENTMCNC: 33.6 G/DL (ref 33.7–35.3)
MCV RBC AUTO: 87.2 FL (ref 81.4–97.8)
PLATELET # BLD AUTO: 262 K/UL (ref 164–446)
PMV BLD AUTO: 9.9 FL (ref 9–12.9)
RBC # BLD AUTO: 5.87 M/UL (ref 4.7–6.1)
WBC # BLD AUTO: 8.8 K/UL (ref 4.8–10.8)

## 2018-08-16 PROCEDURE — 80053 COMPREHEN METABOLIC PANEL: CPT

## 2018-08-16 PROCEDURE — 93010 ELECTROCARDIOGRAM REPORT: CPT | Performed by: INTERNAL MEDICINE

## 2018-08-16 PROCEDURE — 85027 COMPLETE CBC AUTOMATED: CPT

## 2018-08-16 PROCEDURE — 93005 ELECTROCARDIOGRAM TRACING: CPT

## 2018-08-16 PROCEDURE — 36415 COLL VENOUS BLD VENIPUNCTURE: CPT

## 2018-08-16 NOTE — OR NURSING
Pre admit apt: Pt. Instructed to continue regularly prescribed medications through day before surgery.  Instructed to take the following medications, the day of surgery, with a sip of water per anesthesia protocol: omeprazole

## 2018-08-17 LAB
ALBUMIN SERPL BCP-MCNC: 4.5 G/DL (ref 3.2–4.9)
ALBUMIN/GLOB SERPL: 1.4 G/DL
ALP SERPL-CCNC: 49 U/L (ref 30–99)
ALT SERPL-CCNC: 23 U/L (ref 2–50)
ANION GAP SERPL CALC-SCNC: 9 MMOL/L (ref 0–11.9)
AST SERPL-CCNC: 21 U/L (ref 12–45)
BILIRUB SERPL-MCNC: 0.7 MG/DL (ref 0.1–1.5)
BUN SERPL-MCNC: 18 MG/DL (ref 8–22)
CALCIUM SERPL-MCNC: 9.7 MG/DL (ref 8.5–10.5)
CHLORIDE SERPL-SCNC: 104 MMOL/L (ref 96–112)
CO2 SERPL-SCNC: 25 MMOL/L (ref 20–33)
CREAT SERPL-MCNC: 1.24 MG/DL (ref 0.5–1.4)
GLOBULIN SER CALC-MCNC: 3.2 G/DL (ref 1.9–3.5)
GLUCOSE SERPL-MCNC: 114 MG/DL (ref 65–99)
POTASSIUM SERPL-SCNC: 3.4 MMOL/L (ref 3.6–5.5)
PROT SERPL-MCNC: 7.7 G/DL (ref 6–8.2)
SODIUM SERPL-SCNC: 138 MMOL/L (ref 135–145)

## 2018-08-17 NOTE — OR NURSING
No I think that is plenty adequate. We will leave it up to the assigned anesthesiologist on the day of the procedure to go over risks and benefits of the anesthetic plan prior to proceeding. Thanks again.  Kevin Carrasco M.D.  [facility]    On Aug 16, 2018, at 17:15, Elba Lacey [email or web address]> wrote:  He does have a cmp from 3/18, with normal liver enzymes.  Is there something further you are looking for as far as liver function tests?     From: Kevin Carrasco   Sent: Thursday, August 16, 2018 5:10 PM  To: Elba Lacey [email or web address]>  Cc: Nessa Soliman [email or web address]>; Aliza Anders [email or web address]>  Subject: Re: bmi alert secure     Hi Elba.  It is unfortunate that a young person has so many issues. I see the diagnosis of liver disease without any elaboration. Do you see or have you seen a recent liver function test? It would be nice if the G.I. office gives us a little better understanding of how bad his liver disease is so that we can determine whether he needs any further laboratory testing prior to this procedure.  Otherwise nothing further to add. OK to proceed. Thank you.  Kevin Carrasco M.D.  [facility]    On Aug 16, 2018, at 16:41, Elba Lacey [email or web address]> wrote:   Pricila Carrasco, this pt is having an EUS with Dr. Thomas on 8/24.  His BMI is 46.37.  He has all the normals:  HTN, high cholesterol, diabetes, sleep apnea-does use cpap.  Has liver disease, unfortunate, such a nice young man!        Anesthesia Summary Report            Patient Name: Medina Gallo MRN: [id number] Admission Date: Patient not admitted   Allergies: No Known Allergies

## 2018-08-24 ENCOUNTER — HOSPITAL ENCOUNTER (OUTPATIENT)
Facility: MEDICAL CENTER | Age: 28
End: 2018-08-24
Attending: INTERNAL MEDICINE | Admitting: INTERNAL MEDICINE
Payer: COMMERCIAL

## 2018-08-24 ENCOUNTER — APPOINTMENT (OUTPATIENT)
Dept: RADIOLOGY | Facility: MEDICAL CENTER | Age: 28
End: 2018-08-24
Attending: INTERNAL MEDICINE
Payer: COMMERCIAL

## 2018-08-24 VITALS
BODY MASS INDEX: 45.24 KG/M2 | TEMPERATURE: 97.2 F | RESPIRATION RATE: 14 BRPM | SYSTOLIC BLOOD PRESSURE: 145 MMHG | DIASTOLIC BLOOD PRESSURE: 96 MMHG | OXYGEN SATURATION: 95 % | HEART RATE: 82 BPM | HEIGHT: 61 IN | WEIGHT: 239.64 LBS

## 2018-08-24 LAB — GLUCOSE BLD-MCNC: 95 MG/DL (ref 65–99)

## 2018-08-24 PROCEDURE — 82962 GLUCOSE BLOOD TEST: CPT

## 2018-08-24 PROCEDURE — 700111 HCHG RX REV CODE 636 W/ 250 OVERRIDE (IP)

## 2018-08-24 PROCEDURE — 160048 HCHG OR STATISTICAL LEVEL 1-5: Performed by: INTERNAL MEDICINE

## 2018-08-24 PROCEDURE — 700105 HCHG RX REV CODE 258: Performed by: INTERNAL MEDICINE

## 2018-08-24 PROCEDURE — 160036 HCHG PACU - EA ADDL 30 MINS PHASE I: Performed by: INTERNAL MEDICINE

## 2018-08-24 PROCEDURE — 160203 HCHG ENDO MINUTES - 1ST 30 MINS LEVEL 4: Performed by: INTERNAL MEDICINE

## 2018-08-24 PROCEDURE — 160035 HCHG PACU - 1ST 60 MINS PHASE I: Performed by: INTERNAL MEDICINE

## 2018-08-24 PROCEDURE — 160002 HCHG RECOVERY MINUTES (STAT): Performed by: INTERNAL MEDICINE

## 2018-08-24 PROCEDURE — 160046 HCHG PACU - 1ST 60 MINS PHASE II: Performed by: INTERNAL MEDICINE

## 2018-08-24 PROCEDURE — 160009 HCHG ANES TIME/MIN: Performed by: INTERNAL MEDICINE

## 2018-08-24 PROCEDURE — 500066 HCHG BITE BLOCK, ECT: Performed by: INTERNAL MEDICINE

## 2018-08-24 PROCEDURE — 160025 RECOVERY II MINUTES (STATS): Performed by: INTERNAL MEDICINE

## 2018-08-24 RX ORDER — SODIUM CHLORIDE, SODIUM LACTATE, POTASSIUM CHLORIDE, CALCIUM CHLORIDE 600; 310; 30; 20 MG/100ML; MG/100ML; MG/100ML; MG/100ML
INJECTION, SOLUTION INTRAVENOUS
Status: DISCONTINUED | OUTPATIENT
Start: 2018-08-24 | End: 2018-08-24 | Stop reason: HOSPADM

## 2018-08-24 RX ORDER — LIDOCAINE HYDROCHLORIDE 10 MG/ML
INJECTION, SOLUTION EPIDURAL; INFILTRATION; INTRACAUDAL; PERINEURAL
Status: DISCONTINUED
Start: 2018-08-24 | End: 2018-08-24 | Stop reason: HOSPADM

## 2018-08-24 ASSESSMENT — PAIN SCALES - GENERAL
PAINLEVEL_OUTOF10: 0
PAINLEVEL_OUTOF10: 0
PAINLEVEL_OUTOF10: ASSUMED PAIN PRESENT
PAINLEVEL_OUTOF10: 0

## 2018-08-24 NOTE — OR NURSING
1325 To PACU from OR via gurney, side rails up x 2 for safety, lungs clear bilaterally, pt arouses to voice but eyes close quickly when unstimulated. Breathing easy and unlabored.   1335 Placed CPAP on patient at home settings and will monitor oxygenation. Pt denies using oxygen use with CPAP. Denies pain or nausea. Abdomen soft and non-tender.   1350 Pt arouses easily for Dr Thomas who reviewed procedural results with him. Pt denies pain and nausea.   1405 Pt resting quietly on CPAP; no desaturations on RA.   1410 Removed from CPAP after 35 minutes and will monitor on RA for complete hour per [facility] protocol. Pt denies pain or nausea.   1425 Denies pain and nausea. Pt awake; no desaturations in PACU. Meets criteria for transfer to stage II.

## 2018-08-24 NOTE — DISCHARGE INSTRUCTIONS
ENDOSCOPY HOME CARE INSTRUCTIONS    GASTROSCOPY OR ERCP  1. Don't eat or drink anything for about an hour after the test. You can then resume your regular diet.  2. Don't drive or drink alcohol for 24 hours. The medication you received will make you too drowsy.  3. Don't take any coffee, tea, or aspirin products until after you see your doctor. These can harm the lining of your stomach.  4. If you begin to vomit bloody material, or develop black or bloody stools, call your doctor as soon as possible.  5. If you have any neck, chest, abdominal pain or temp of 100 degrees, call your doctor.  6. See your doctor Return to clinic in 1 year unless new symptoms arise  7. Additional instructions: none  8. Prescriptions: none      You should call 911 if you develop problems with breathing or chest pain.  If any questions arise, call your doctor. If your doctor is not available, please feel free to call [phone]. You can also call the HEALTH HOTLINE open 24 hours/day, 7 days/week and speak to a nurse at [phone], or toll free [phone].    Depression / Suicide Risk    As you are discharged from this [facility] facility, it is important to learn how to keep safe from harming yourself.    Recognize the warning signs:  · Abrupt changes in personality, positive or negative- including increase in energy   · Giving away possessions  · Change in eating patterns- significant weight changes-  positive or negative  · Change in sleeping patterns- unable to sleep or sleeping all the time   · Unwillingness or inability to communicate  · Depression  · Unusual sadness, discouragement and loneliness  · Talk of wanting to die  · Neglect of personal appearance   · Rebelliousness- reckless behavior  · Withdrawal from people/activities they love  · Confusion- inability to concentrate     If you or a loved one observes any of these behaviors or has concerns about self-harm, here's what you can do:  · Talk about it- your  feelings and reasons for harming yourself  · Remove any means that you might use to hurt yourself (examples: pills, rope, extension cords, firearm)  · Get professional help from the community (Mental Health, Substance Abuse, psychological counseling)  · Do not be alone:Call your Safe Contact- someone whom you trust who will be there for you.  · Call your local CRISIS HOTLINE 338-4343 or 324-098-2835  · Call your local Children's Mobile Crisis Response Team [place] (797) 085-2037 or wwwVigiglobe  · Call the toll free National Suicide Prevention Hotlines   · National Suicide Prevention Lifeline 488-491-JWIR (0808)  · National [place] Line Network 800-SUICIDE (590-3725)    I acknowledge receipt and understanding of these Home Care Instructions.    Discharge Education for patients on GALILEA (Obstructive Sleep Apnea) Protocol    We recommend that you should be with an adult observer for at least 24 hours after your sedation/anesthesia.  If you have a CPAP machine, you should wear it during any sleep period (day or night) for the week following your procedure.  We encourage you to sleep on your side or in a sitting position, even with napping.  Lying flat on your back increases the risk of apnea and airway obstruction during your post procedure recovery period.    It is important to prevent over-sedation that could increase your risk for apnea.  Please take all pain medication as directed by your physician.  If you are not getting pain relief, please contact your physician to discuss possible approaches to relieving pain while minimizing medications that can affect your breathing and oxygen levels.

## 2018-08-24 NOTE — OR NURSING
1432- Patient admitted to stage II area. Patient assisted with dressing from nursing staff. Patient vital signs taken and recorded. Patient denies any pain, nausea, or discomfort at this time.    1500- Patient assisted to restroom. Patient able to ambulate with stand by assistance. Patient awaiting transportation home.    1525- Discharge instructions discussed with patient and family. Instructions stated as understood and patient discharged home via wheelchair.

## 2018-08-24 NOTE — OP REPORT
DATE OF SERVICE:  8/24/2018     INDICATION FOR PROCEDURE:  pancreatic cysts    PROCEDURE PERFORMED: EGD/EUS     CONSENT:  Informed consent was obtained directly from the patient after benefits, risks and possible alternatives were discussed.     MEDICATIONS:  The patient received propofol for this procedure. Please see the anesthesia record for details     PROCEDURE DESCRIPTION:  The patient was placed in the left lateral decubitus   position and provided with supplemental oxygen via nasal cannula.  His vital signs were monitored continuously throughout the procedure.  When ready, the upper endoscope was placed in the patient's mouth and advanced easily and carefully to the esophagus and subsequently to the stomach and duodenum. The scope was then slowly withdrawn and mucosa examined. Retroflexion was performed in the rectum    The EUS scope was then selected. The scope was placed in the mouth and advanced with semi-direct visualization through the oropharynx to the esophagus, stomach and duodenum. Ultrasound images are noted below.     FINDINGS:    Esophagus: normal    Stomach: normal    Duodenum: normal    EUS: The linear scope was selected. The gastric wall appeared normal. The left liver appeared normal. There was no intrahepatic ductal dilation. The aorta, celiac artery and SMA appeared normal. The left kidney, left adrenal and spleen appeared normal. The pancreas appeared heterogeneous with lobulation and hyperechoic lines. There were 3-4 small anechoic cysts in the body and tail of the pancreas. The largest measured 1cm in diameter. There were no worrisome features to these cysts and they appeared to be dilated pancreatic branch ducts. The main PD measured 0.8mm in the distal body of the pancreas. The scope was advanced to the duodenal bulb. The duodenal wall appeared normal. The CBD measured 3.8mm. The portal venous confluence was visualized and appeared normal. The PD measured 2.8mm. There was another tiny  anechoic cyst in the head of the pancreas that measured 4mm. The gallbladder was visualized and there was adherent sludge vs polyps along the GB wall. The largest measured 6mm in diameter. The scope was advanced to D2 and the uncinate appeared normal. The scope was withdrawn to the esophagus. The esophageal wall was normal. There was no lymphadenopathy present. The heart valves appeared grossly normal.        COMPLICATIONS:  No complications or blood loss during or in the immediate postoperative period.     IMPRESSION:  1)multiple pancreatic cyst with the largest measuring 1cm in the pancreatic tail. No main duct involvement or mural nodules were seen. Surveillance of these can be performed when patient gets his f/u MRI for his liver lesions  2) GB polyps      RECOMMENDATION:    1) continue MRI surveillance of liver and pancreas yearly  2) discharge home  3) Return to clinic in 1 year unless new symptoms arise

## 2018-10-01 ENCOUNTER — OFFICE VISIT (OUTPATIENT)
Dept: MEDICAL GROUP | Facility: MEDICAL CENTER | Age: 28
End: 2018-10-01
Payer: COMMERCIAL

## 2018-10-01 VITALS
TEMPERATURE: 98 F | OXYGEN SATURATION: 97 % | SYSTOLIC BLOOD PRESSURE: 160 MMHG | BODY MASS INDEX: 39.65 KG/M2 | WEIGHT: 238 LBS | HEIGHT: 65 IN | DIASTOLIC BLOOD PRESSURE: 108 MMHG | HEART RATE: 99 BPM | RESPIRATION RATE: 16 BRPM

## 2018-10-01 DIAGNOSIS — G47.33 OBSTRUCTIVE SLEEP APNEA: ICD-10-CM

## 2018-10-01 DIAGNOSIS — I27.20 MILD PULMONARY HYPERTENSION (HCC): ICD-10-CM

## 2018-10-01 DIAGNOSIS — E66.9 OBESITY (BMI 30-39.9): ICD-10-CM

## 2018-10-01 DIAGNOSIS — D37.6 NEOPLASM OF UNCERTAIN BEHAVIOR OF LIVER AND BILIARY PASSAGES: ICD-10-CM

## 2018-10-01 DIAGNOSIS — R73.03 PREDIABETES: ICD-10-CM

## 2018-10-01 DIAGNOSIS — I10 ESSENTIAL HYPERTENSION: ICD-10-CM

## 2018-10-01 DIAGNOSIS — E78.5 DYSLIPIDEMIA: ICD-10-CM

## 2018-10-01 PROBLEM — E11.9 TYPE 2 DIABETES MELLITUS WITHOUT COMPLICATION, WITHOUT LONG-TERM CURRENT USE OF INSULIN (HCC): Status: RESOLVED | Noted: 2017-09-15 | Resolved: 2018-10-01

## 2018-10-01 PROCEDURE — 99214 OFFICE O/P EST MOD 30 MIN: CPT | Performed by: NURSE PRACTITIONER

## 2018-10-01 RX ORDER — CARVEDILOL 25 MG/1
25 TABLET ORAL 2 TIMES DAILY WITH MEALS
Qty: 60 TAB | Refills: 11 | Status: SHIPPED | OUTPATIENT
Start: 2018-10-01 | End: 2019-11-26 | Stop reason: SDUPTHER

## 2018-10-01 RX ORDER — AMLODIPINE BESYLATE 10 MG/1
10 TABLET ORAL DAILY
Qty: 30 TAB | Refills: 11 | Status: SHIPPED | OUTPATIENT
Start: 2018-10-01 | End: 2019-11-26 | Stop reason: SDUPTHER

## 2018-10-01 NOTE — ASSESSMENT & PLAN NOTE
Continues with atorvastatin 20 mg daily, tolerating medication without side effect including myalgia

## 2018-10-01 NOTE — PROGRESS NOTES
Subjective:     Chief Complaint   Patient presents with   • Follow-Up   • Medication Management   • Medication Refill     Medina Gallo is a 28 y.o. male here today to follow up on:    Obstructive sleep apnea  Using CPAP, sleeping well. 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  On most recent studies several small cysts were noted in the pancreas    Essential hypertension  BP uncontrolled, out of medication at this point after lack of f/u  Previously on amlodipine 10 mg daily, carvedilol 25 mg daily, spironolactone 25 mg daily.  Reports home blood pressure readings in the 120s systolic when taking medication  Denies chest pain, dizziness, palpitations, headaches    Dyslipidemia  Continues with atorvastatin 20 mg daily, tolerating medication without side effect including myalgia       Current medicines (including changes today)  Current Outpatient Prescriptions   Medication Sig Dispense Refill   • metFORMIN (GLUCOPHAGE) 500 MG Tab Take 1 Tab by mouth 2 times a day. 60 Tab 11   • carvedilol (COREG) 25 MG Tab Take 1 Tab by mouth 2 times a day, with meals. 60 Tab 11   • amLODIPine (NORVASC) 10 MG Tab Take 1 Tab by mouth every day. 30 Tab 11   • Multiple Vitamins-Minerals (MULTIVITAMIN ADULT PO) Take  by mouth every day.     • spironolactone (ALDACTONE) 25 MG Tab TAKE ONE TABLET BY MOUTH ONE TIME DAILY  30 Tab 11   • atorvastatin (LIPITOR) 20 MG Tab TAKE ONE TABLET BY MOUTH AT BEDTIME  30 Tab 11   • omeprazole (PRILOSEC) 20 MG delayed-release capsule Take 1 Cap by mouth every day. 30 Cap 0     No current facility-administered medications for this visit.      He  has a past medical history of Cold; Diabetes (HCC); Dyslipidemia (12/2/2015); Fatty liver; Gout; High cholesterol; Hypertension (2015); Liver mass; Obese; Sleep apnea; and  "Snoring. He also has no past medical history of Allergy.    ROS included above     Objective:     Blood pressure 160/108, pulse 99, temperature 36.7 °C (98 °F), temperature source Temporal, resp. rate 16, height 1.651 m (5' 5\"), weight 108 kg (238 lb), SpO2 97 %. Body mass index is 39.61 kg/m².     Physical Exam:  General: Alert, oriented in no acute distress.  Eye contact is good, speech is normal, affect calm  Lungs: clear to auscultation bilaterally, normal effort, no wheeze/ rhonchi/ rales.  CV: regular rate and rhythm, S1, S2, no murmur. No pedal edema. No JVD  Abdomen: soft, nontender  Ext: color normal, vascularity normal, temperature normal    Assessment and Plan:   The following treatment plan was discussed   1. Obstructive sleep apnea   compliant with CPAP   2. Mild pulmonary hypertension (HCC)   stable   3. 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  Will be following up for repeat imaging   4. Essential hypertension  Chronic issue, uncontrolled.  Patient has ran out of failing to follow-up.  Importance of consistent medication use reviewed.  Medication renewals sent, advised to keep follow-up with cardiology in December as planned  carvedilol (COREG) 25 MG Tab    amLODIPine (NORVASC) 10 MG Tab    COMP METABOLIC PANEL   5. Obesity (BMI 30-39.9)   20 pound weight loss with diet and exercise  Patient identified as having weight management issue.  Appropriate orders and counseling given.   6. Dyslipidemia  Stable   LIPID PROFILE    COMP METABOLIC PANEL   7. Prediabetes   is been working on diet, increased exercise, has lost 20 pounds.  Continue to monitor  metFORMIN (GLUCOPHAGE) 500 MG Tab    HEMOGLOBIN A1C       Followup: Pending labs       Please note that this dictation was created using voice recognition software. I have worked with consultants from the vendor as well as technical experts from [employer] to optimize the interface. I have " made every reasonable attempt to correct obvious errors, but I expect that there are errors of grammar and possibly content that I did not discover before finalizing the note.

## 2018-10-01 NOTE — ASSESSMENT & PLAN NOTE
BP uncontrolled, out of medication at this point after lack of f/u  Previously on amlodipine 10 mg daily, carvedilol 25 mg daily, spironolactone 25 mg daily.  Reports home blood pressure readings in the 120s systolic when taking medication  Denies chest pain, dizziness, palpitations, headaches

## 2018-12-12 ENCOUNTER — HOSPITAL ENCOUNTER (OUTPATIENT)
Dept: LAB | Facility: MEDICAL CENTER | Age: 28
End: 2018-12-12
Attending: NURSE PRACTITIONER
Payer: COMMERCIAL

## 2018-12-12 DIAGNOSIS — I10 ESSENTIAL HYPERTENSION: ICD-10-CM

## 2018-12-12 DIAGNOSIS — R73.03 PREDIABETES: ICD-10-CM

## 2018-12-12 DIAGNOSIS — E78.5 DYSLIPIDEMIA: ICD-10-CM

## 2018-12-12 PROCEDURE — 83036 HEMOGLOBIN GLYCOSYLATED A1C: CPT

## 2018-12-12 PROCEDURE — 80053 COMPREHEN METABOLIC PANEL: CPT

## 2018-12-12 PROCEDURE — 80061 LIPID PANEL: CPT

## 2018-12-12 PROCEDURE — 36415 COLL VENOUS BLD VENIPUNCTURE: CPT

## 2018-12-13 ENCOUNTER — OFFICE VISIT (OUTPATIENT)
Dept: VASCULAR LAB | Facility: MEDICAL CENTER | Age: 28
End: 2018-12-13
Attending: INTERNAL MEDICINE
Payer: COMMERCIAL

## 2018-12-13 VITALS
WEIGHT: 238 LBS | HEIGHT: 65 IN | SYSTOLIC BLOOD PRESSURE: 134 MMHG | DIASTOLIC BLOOD PRESSURE: 77 MMHG | BODY MASS INDEX: 39.65 KG/M2 | HEART RATE: 63 BPM

## 2018-12-13 DIAGNOSIS — M10.9 GOUT, UNSPECIFIED CAUSE, UNSPECIFIED CHRONICITY, UNSPECIFIED SITE: ICD-10-CM

## 2018-12-13 DIAGNOSIS — E78.5 DYSLIPIDEMIA: ICD-10-CM

## 2018-12-13 DIAGNOSIS — G47.33 OSA (OBSTRUCTIVE SLEEP APNEA): ICD-10-CM

## 2018-12-13 DIAGNOSIS — E11.9 TYPE 2 DIABETES MELLITUS WITHOUT COMPLICATION, WITHOUT LONG-TERM CURRENT USE OF INSULIN (HCC): ICD-10-CM

## 2018-12-13 DIAGNOSIS — E78.1 HYPERTRIGLYCERIDEMIA: ICD-10-CM

## 2018-12-13 DIAGNOSIS — I10 ESSENTIAL HYPERTENSION: ICD-10-CM

## 2018-12-13 LAB
ALBUMIN SERPL BCP-MCNC: 4.9 G/DL (ref 3.2–4.9)
ALBUMIN/GLOB SERPL: 1.5 G/DL
ALP SERPL-CCNC: 54 U/L (ref 30–99)
ALT SERPL-CCNC: 38 U/L (ref 2–50)
ANION GAP SERPL CALC-SCNC: 10 MMOL/L (ref 0–11.9)
AST SERPL-CCNC: 30 U/L (ref 12–45)
BILIRUB SERPL-MCNC: 1.2 MG/DL (ref 0.1–1.5)
BUN SERPL-MCNC: 18 MG/DL (ref 8–22)
CALCIUM SERPL-MCNC: 9.5 MG/DL (ref 8.5–10.5)
CHLORIDE SERPL-SCNC: 101 MMOL/L (ref 96–112)
CHOLEST SERPL-MCNC: 82 MG/DL (ref 100–199)
CO2 SERPL-SCNC: 25 MMOL/L (ref 20–33)
CREAT SERPL-MCNC: 1.08 MG/DL (ref 0.5–1.4)
EST. AVERAGE GLUCOSE BLD GHB EST-MCNC: 137 MG/DL
FASTING STATUS PATIENT QL REPORTED: NORMAL
GLOBULIN SER CALC-MCNC: 3.2 G/DL (ref 1.9–3.5)
GLUCOSE SERPL-MCNC: 90 MG/DL (ref 65–99)
HBA1C MFR BLD: 6.4 % (ref 0–5.6)
HDLC SERPL-MCNC: 24 MG/DL
LDLC SERPL CALC-MCNC: 21 MG/DL
POTASSIUM SERPL-SCNC: 3.6 MMOL/L (ref 3.6–5.5)
PROT SERPL-MCNC: 8.1 G/DL (ref 6–8.2)
SODIUM SERPL-SCNC: 136 MMOL/L (ref 135–145)
TRIGL SERPL-MCNC: 187 MG/DL (ref 0–149)

## 2018-12-13 PROCEDURE — 99212 OFFICE O/P EST SF 10 MIN: CPT | Performed by: FAMILY MEDICINE

## 2018-12-13 PROCEDURE — 99214 OFFICE O/P EST MOD 30 MIN: CPT | Performed by: FAMILY MEDICINE

## 2018-12-13 RX ORDER — ATORVASTATIN CALCIUM 10 MG/1
10 TABLET, FILM COATED ORAL DAILY
Qty: 90 TAB | Refills: 3 | Status: SHIPPED | OUTPATIENT
Start: 2018-12-13 | End: 2019-11-28 | Stop reason: SDUPTHER

## 2018-12-13 ASSESSMENT — ENCOUNTER SYMPTOMS
BRUISES/BLEEDS EASILY: 0
DEPRESSION: 0
MYALGIAS: 0
SHORTNESS OF BREATH: 0
HEADACHES: 0
COUGH: 0
WHEEZING: 0
FOCAL WEAKNESS: 0
PALPITATIONS: 0
CLAUDICATION: 0
WEIGHT LOSS: 0
LOSS OF CONSCIOUSNESS: 0
NERVOUS/ANXIOUS: 0

## 2018-12-14 NOTE — PROGRESS NOTES
" Resistant Hypertension Follow Up Visit  12/13/18    Assessment / Plan:   1. Blood Pressure Control:  ACC/AHA (2017) goal <130/80, but JNC8 <140/90 reasonable consider age   Home BP at goal:  yes  Office BP at goal:  yes  Plan:   - continue home BP monitoring, reviewed correct technique:  Yes   - order 24h ABPM:  Previously completed Under reasonable control on ABPM:   - encouraged more home BP monitoring (large cuff); states he will try and take it, \"at grocery stores.\"    2. Work up of Secondary Causes of Resistant Hypertension:   Renovascular HTN: Renal artery duplex with no evidence or MEGAN (2015), but may not be best test for potential FMD  Primary Aldosteronism: Excluded PRA/hilda 1.4/11.8 No adrenal adenoma noted on previous imaging  Thyroid Function: Excluded - TSH WNL 9/9/17  Obstructive Sleep Apnea: Postive - awaiting CPAP - has yet to get machine; pt states he may have to , \"repeat,\" sleep study  Pheochromocytoma: Excluded 24 hour urine normal July 2014  Instrinsic renal disease - normal GFR and kidneys unremarkable appearing on sono  Coarctation - not seen on previous imaging/angiogram  Recommendations At This Visit:      - start cpap   - consider MRA renal arteries in future if BP becomes more difficult to control    3. Medication Use / Adherence:  Assessment: Completely compliant  Recommendations: Instructed to Continue Taking All Medications As Prescribed- with the exception of adding metformin.         4. End Organ Damage:   Left Ventricular Hypertrophy: Present on  Echocardiogram Date: 2015  Albuminuria: improved 9/9/17: 36  Renal Function: Chronic Kidney Disease  Stage 2 at worst (GFR >60)  Established Cardiovascular Disease: None    5. Lifestyle Recommendations:  - engage in moderate to vigorous physical activity such as brisk walking, swimming, cycling, >150 minutes per week at 30-40 minutes per session, 3 to 5 times weekly    - consume diet that emphasizes intake of vegetables, fruits, and whole " grains,  - use low fat diary products, poultry, fish, legumes, nontropical vegetable oils, nuts  - limit intake of sweets, sugar-sweetned beverages, and red meats   - reduce saturated and trans fats to <6% of your daily calories   - consume no more than 2,400mg of sodium daily (look at food labels)  - healthy diet plans reviewed including plate method, DASH, AHA diets       6. Standard HTN Pharmacotherapy:  ACEI/ARB:  Consider adding ACE or ARB in future if BP not controlled  Thiazide Type Diuretic: Avoid given gout and hypokalemia  Calcium Channel Blocker (CCB): continue amlodipine 10 mg daily    7. Additional Agents:  Beta Blocker - continue carvedilol 25mg BID   Mineralocorticoid Receptor Antagonist (MRA) - continue spironolactone 25 mg daily      8. Other CV Risk Factors:   Lipids - ASCVD risk >10%  LDLP # and small LDLP well control  TC still low, but improved  - decrease atorvastatin to 10mg once daily  - continue vit D 4000 units daily     Borderline T2D per A1C guidelines = 6.6- worsened with weight gain  - continue intensified TLC  - increase metformin to 850mg BID   - recheck labs in about 3mo    9. Other Issues:  Gout - no recent recurrence. 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 Ding.  LFTs previously elevated, now normal. Will defer all further w/u and management to GI and PCP    Studies Ordered At This Visit:  None   Blood Work to Be Obtained Prior to Next Visit:  CMP, ACR, lipid, A1c in 3mo - we will call results     Follow Up:  6mo     Diagnosis:  1. Essential hypertension     2. Type 2 diabetes mellitus without complication, without long-term current use of insulin (HCC)  Lipid Profile    COMP METABOLIC PANEL    MICROALBUMIN CREAT RATIO URINE RANDOM    HEMOGLOBIN A1C (Glycohemoglobin GHB Total/A1C with MBG Estimate)   3. Dyslipidemia   Lipid Profile   4. GALILEA (obstructive sleep apnea)     5. Gout, unspecified cause, unspecified chronicity, unspecified site     6. Hypertriglyceridemia          History of Present Illness:   Medina Gallo is a 28 y.o. male who was seen 16 for f/u of hypertension, dyslidipidemia, [id number], gout and GALILEA    HTN:  Current HTN concerns:  Had stopped carvedilol due to lack of refills, restarted since Oct and much better.   Recent studies/labs completed (reviewed with patient, noted below): Yes  HTN sx:  No current blurred or changed vision, chest pain, shortness of breath, headache, nausea, dizziness/vertigo   Home BP los/70s  24h ABPM completed:  , completed   Adherence to current HTN meds: compliant all of the time     HLD:   No myalgias on statin    GALILEA:   Was diagnosed with galilea, but not yet started on cpap- had f/u with pulm last wk.  May have to repeat sleep study    PreDM/borderline T2D:   Less adherence with lifestyle mod, but motivated to lose wt- joined a gym this mo  Never been on oral hypoglyecmics; metformin 500mg BID     Gout: No gout flares    Social History   Substance Use Topics   • Smoking status: Never Smoker   • Smokeless tobacco: Never Used   • Alcohol use 0.0 oz/week      Comment: 1 per week     SOCIAL HISTORY  Diet - far less consistent.  Will try low carb, low sugar  Weight - up 25 pounds over the last yr  Exercise - joined a gym, has been doing cardio/wt lifting 3x/wk  No smoking      Review of Systems:   Review of Systems   Constitutional: Positive for malaise/fatigue. Negative for weight loss.   Respiratory: Negative for cough, shortness of breath and wheezing.    Cardiovascular: Negative for chest pain, palpitations, claudication and leg swelling.   Musculoskeletal: Negative for myalgias.   Neurological: Negative for focal weakness, loss of consciousness and headaches.   Endo/Heme/Allergies: Does not bruise/bleed easily.   Psychiatric/Behavioral: Negative for  "depression. The patient is not nervous/anxious.         Objective:   Allergies:  Patient has no known allergies.    Vitals:    12/13/18 1636 12/13/18 1641   BP: 133/76 134/77   BP Location: Left arm Left arm   Patient Position: Sitting Sitting   BP Cuff Size: Large adult Large adult   Pulse: 64 63   Weight: 108 kg (238 lb) 108 kg (238 lb)   Height: 1.651 m (5' 5\") 1.651 m (5' 5\")     BP Readings from Last 4 Encounters:   12/13/18 134/77   10/01/18 160/108   08/24/18 145/96   02/13/18 142/78      Body mass index is 39.61 kg/m².   BMI Readings from Last 4 Encounters:   12/13/18 39.61 kg/m²   10/01/18 39.61 kg/m²   08/24/18 45.28 kg/m²   02/13/18 43.03 kg/m²      Physical Exam   Constitutional: He is oriented to person, place, and time. He appears well-developed and well-nourished. He is cooperative. No distress.   HENT:   Head: Normocephalic and atraumatic.   Mouth/Throat: Oropharynx is clear and moist and mucous membranes are normal.   Eyes: Pupils are equal, round, and reactive to light. Conjunctivae are normal.   Neck: Trachea normal and normal range of motion. Neck supple. Normal carotid pulses and no JVD present. Carotid bruit is not present. No thyromegaly present.   Cardiovascular: Normal rate, regular rhythm, normal heart sounds and intact distal pulses.  Exam reveals no gallop and no friction rub.    No murmur heard.  Pulses:       Carotid pulses are 2+ on the right side, and 2+ on the left side.       Radial pulses are 2+ on the right side, and 2+ on the left side.        Dorsalis pedis pulses are 2+ on the right side, and 2+ on the left side.        Posterior tibial pulses are 2+ on the right side, and 2+ on the left side.   Edema:    RLE: none     LLE: none   Spider telangectasia:       RLE:  None      LLE: none   Varicosities:         RLE: none      LLE: none   Corona phlebectatica:      RLE:  None        LLE:  None   Cording:         RLE:  None     LLE: None    Pulmonary/Chest: Effort normal and breath " sounds normal. No respiratory distress.   Abdominal: Soft. Bowel sounds are normal. He exhibits no distension and no mass. There is no hepatosplenomegaly. There is no tenderness. There is no rebound and no guarding.   Musculoskeletal: He exhibits no edema.   Lymphadenopathy:     He has no cervical adenopathy.   Neurological: He is alert and oriented to person, place, and time. No cranial nerve deficit. Coordination and gait normal.   Skin: Skin is warm and dry. He is not diaphoretic. No cyanosis. No pallor. Nails show no clubbing.   Psychiatric: He has a normal mood and affect.        Accessory Clinical Findings:   Echocardiogram:  Results for orders placed or performed during the hospital encounter of 07/28/15   ECHOCARDIOGRAM COMP W/O CONT   Result Value Ref Range    Eject.Frac. MOD BP 59.37     Eject.Frac. MOD 4C 62.42     Eject.Frac. MOD 2C 60.22       Lab Results   Component Value Date    CHOLSTRLTOT 82 (L) 12/12/2018    LDL 21 12/12/2018    HDL 24 (A) 12/12/2018    TRIGLYCERIDE 187 (H) 12/12/2018    LDLPART See Note 09/09/2017    LDLPART See Note 09/09/2017    SMLLDL See Note 09/09/2017    LHDLPART See Note 09/09/2017    LVLDLPT See Note 09/09/2017    LDLCHOL 34 09/09/2017    HDLSIZE See Note 09/09/2017    VLDLSIZE  09/09/2017      Comment:      INTERPRETIVE INFORMATION: VLDL Particle Size, NMR  Percentiles in Reference Population:  25th       50th       75th  44.3       46.7       50.2      HDLPART  09/09/2017      Comment:      INTERPRETIVE INFORMATION: HDL Particle Number, NMR  Percentiles in Reference Population:  25th       50th       75th  29.7       33.0       36.8        Lab Results   Component Value Date    PROTHROMBTM 12.6 02/13/2018    INR 0.97 02/13/2018       Lab Results   Component Value Date    HBA1C 6.4 (H) 12/12/2018      Lab Results   Component Value Date    SODIUM 136 12/12/2018    POTASSIUM 3.6 12/12/2018    CHLORIDE 101 12/12/2018    CO2 25 12/12/2018    GLUCOSE 90 12/12/2018    BUN 18  2018    CREATININE 1.08 2018         Lab Results   Component Value Date    URCREAT 19.20 2018    MICROALBUR <0.7 2018    MALBCRT see below 2018     Lab Results   Component Value Date    STMTRPT  2018     [facility]    Test Date:  2018  Pt Name:    DAVE VERMA             Department: [facility]  MRN:        [id number]                      Room:  Gender:     Male                         Technician: MARIO ALBERTO  :        1990                   Requested By:MILAN BERMUDEZ  Order #:    [id number]                    Reading MD: Law Dupont MD    Measurements  Intervals                                Axis  Rate:       88                           P:          36  VA:         140                          QRS:        56  QRSD:       84                           T:          30  QT:         364  QTc:        441    Interpretive Statements  SINUS RHYTHM  Compared to ECG 2018 06:09:10  No significant changes    Electronically Signed On 2018 17:21:40 PDT by Law Dupont MD       Multiple imaging studies and lab reports were available in EMR and reviewed at today's visit    Pradip Moralez M.D.     Cc:   TATYANA Naidu

## 2018-12-14 NOTE — PATIENT INSTRUCTIONS
1) decrease atorvastatin to 10mg   2) increase metformin to 850mg 2 times daily   3) continue BP meds   4) check labs in 3mo  5) follow-up in 6mo

## 2019-02-21 ENCOUNTER — OFFICE VISIT (OUTPATIENT)
Dept: URGENT CARE | Facility: CLINIC | Age: 29
End: 2019-02-21
Payer: COMMERCIAL

## 2019-02-21 ENCOUNTER — APPOINTMENT (OUTPATIENT)
Dept: RADIOLOGY | Facility: IMAGING CENTER | Age: 29
End: 2019-02-21
Attending: FAMILY MEDICINE
Payer: COMMERCIAL

## 2019-02-21 VITALS
WEIGHT: 233 LBS | HEIGHT: 65 IN | SYSTOLIC BLOOD PRESSURE: 122 MMHG | DIASTOLIC BLOOD PRESSURE: 76 MMHG | TEMPERATURE: 98.1 F | HEART RATE: 70 BPM | BODY MASS INDEX: 38.82 KG/M2 | RESPIRATION RATE: 16 BRPM | OXYGEN SATURATION: 95 %

## 2019-02-21 DIAGNOSIS — S49.91XA INJURY OF RIGHT SHOULDER, INITIAL ENCOUNTER: ICD-10-CM

## 2019-02-21 DIAGNOSIS — S40.011A CONTUSION OF RIGHT SHOULDER, INITIAL ENCOUNTER: ICD-10-CM

## 2019-02-21 PROCEDURE — 73030 X-RAY EXAM OF SHOULDER: CPT | Mod: TC,RT | Performed by: FAMILY MEDICINE

## 2019-02-21 PROCEDURE — 99213 OFFICE O/P EST LOW 20 MIN: CPT | Performed by: FAMILY MEDICINE

## 2019-02-21 ASSESSMENT — ENCOUNTER SYMPTOMS
NECK PAIN: 0
BACK PAIN: 0
ROS SKIN COMMENTS: NO ABRASION OR LACERATION

## 2019-02-21 NOTE — PROGRESS NOTES
"Subjective:      Medina Gallo is a 28 y.o. male who presents with Shoulder Injury ((R) shoulder injury)            Onset last night right shoulder injury. Fall during jujitsu striking shoulder to mat. Right hand dominant. No prior injury. Pain severity 7/10 with movement. OTC ibuprofen with min relief. No neck pain. No other aggravating or alleviating factors.          Review of Systems   Musculoskeletal: Negative for back pain and neck pain.   Skin:        No abrasion or laceration       .  Medications, Allergies, and current problem list reviewed today in Epic       Objective:     /76 (BP Location: Left arm, Patient Position: Sitting, BP Cuff Size: Large adult)   Pulse 70   Temp 36.7 °C (98.1 °F) (Temporal)   Resp 16   Ht 1.651 m (5' 5\")   Wt 105.7 kg (233 lb)   SpO2 95%   BMI 38.77 kg/m²      Physical Exam   Constitutional: He is oriented to person, place, and time. He appears well-developed and well-nourished. No distress.   HENT:   Head: Normocephalic and atraumatic.   Neck: Normal range of motion. Neck supple.   Musculoskeletal:   Right shoulder: Diffusely tender without deformity.  Pain reproduced with movement in all planes.  Abduction limited to 90 degrees.  No dom weakness but difficult exam due to pain.  Distal neurovascular is intact.   Neurological: He is alert and oriented to person, place, and time.   Skin: Skin is warm and dry.               Assessment/Plan:     X-ray negative per radiology    1. Injury of right shoulder, initial encounter  DX-SHOULDER 2+ RIGHT   2. Contusion of right shoulder, initial encounter       Differential diagnosis, natural history, supportive care, and indications for immediate follow-up discussed at length.     Ice, nsaid prn. Resume activity as tolerated.      "

## 2019-03-08 ENCOUNTER — PATIENT MESSAGE (OUTPATIENT)
Dept: HEALTH INFORMATION MANAGEMENT | Facility: OTHER | Age: 29
End: 2019-03-08

## 2019-03-26 ENCOUNTER — HOSPITAL ENCOUNTER (OUTPATIENT)
Dept: LAB | Facility: MEDICAL CENTER | Age: 29
End: 2019-03-26
Attending: FAMILY MEDICINE
Payer: COMMERCIAL

## 2019-03-26 DIAGNOSIS — E11.9 TYPE 2 DIABETES MELLITUS WITHOUT COMPLICATION, WITHOUT LONG-TERM CURRENT USE OF INSULIN (HCC): ICD-10-CM

## 2019-03-26 DIAGNOSIS — E78.5 DYSLIPIDEMIA: ICD-10-CM

## 2019-03-26 LAB
ALBUMIN SERPL BCP-MCNC: 4.6 G/DL (ref 3.2–4.9)
ALBUMIN/GLOB SERPL: 1.5 G/DL
ALP SERPL-CCNC: 46 U/L (ref 30–99)
ALT SERPL-CCNC: 20 U/L (ref 2–50)
ANION GAP SERPL CALC-SCNC: 9 MMOL/L (ref 0–11.9)
AST SERPL-CCNC: 27 U/L (ref 12–45)
BILIRUB SERPL-MCNC: 0.7 MG/DL (ref 0.1–1.5)
BUN SERPL-MCNC: 17 MG/DL (ref 8–22)
CALCIUM SERPL-MCNC: 9.3 MG/DL (ref 8.5–10.5)
CHLORIDE SERPL-SCNC: 102 MMOL/L (ref 96–112)
CHOLEST SERPL-MCNC: 88 MG/DL (ref 100–199)
CO2 SERPL-SCNC: 26 MMOL/L (ref 20–33)
CREAT SERPL-MCNC: 1.19 MG/DL (ref 0.5–1.4)
CREAT UR-MCNC: 42.4 MG/DL
GLOBULIN SER CALC-MCNC: 3 G/DL (ref 1.9–3.5)
GLUCOSE SERPL-MCNC: 85 MG/DL (ref 65–99)
HDLC SERPL-MCNC: 35 MG/DL
LDLC SERPL CALC-MCNC: 29 MG/DL
MICROALBUMIN UR-MCNC: <0.7 MG/DL
MICROALBUMIN/CREAT UR: NORMAL MG/G (ref 0–30)
POTASSIUM SERPL-SCNC: 3.9 MMOL/L (ref 3.6–5.5)
PROT SERPL-MCNC: 7.6 G/DL (ref 6–8.2)
SODIUM SERPL-SCNC: 137 MMOL/L (ref 135–145)
TRIGL SERPL-MCNC: 118 MG/DL (ref 0–149)

## 2019-03-26 PROCEDURE — 36415 COLL VENOUS BLD VENIPUNCTURE: CPT

## 2019-03-26 PROCEDURE — 82043 UR ALBUMIN QUANTITATIVE: CPT

## 2019-03-26 PROCEDURE — 83036 HEMOGLOBIN GLYCOSYLATED A1C: CPT

## 2019-03-26 PROCEDURE — 80061 LIPID PANEL: CPT

## 2019-03-26 PROCEDURE — 82570 ASSAY OF URINE CREATININE: CPT

## 2019-03-26 PROCEDURE — 80053 COMPREHEN METABOLIC PANEL: CPT

## 2019-03-28 LAB
EST. AVERAGE GLUCOSE BLD GHB EST-MCNC: 111 MG/DL
HBA1C MFR BLD: 5.5 % (ref 0–5.6)

## 2019-04-05 ENCOUNTER — OFFICE VISIT (OUTPATIENT)
Dept: MEDICAL GROUP | Facility: MEDICAL CENTER | Age: 29
End: 2019-04-05
Payer: COMMERCIAL

## 2019-04-05 VITALS
HEART RATE: 71 BPM | DIASTOLIC BLOOD PRESSURE: 70 MMHG | HEIGHT: 65 IN | TEMPERATURE: 97.6 F | RESPIRATION RATE: 16 BRPM | SYSTOLIC BLOOD PRESSURE: 122 MMHG | OXYGEN SATURATION: 99 % | BODY MASS INDEX: 38.15 KG/M2 | WEIGHT: 229 LBS

## 2019-04-05 DIAGNOSIS — E78.5 DYSLIPIDEMIA: ICD-10-CM

## 2019-04-05 DIAGNOSIS — Z78.9 HEPATITIS B VACCINATION STATUS UNKNOWN: ICD-10-CM

## 2019-04-05 DIAGNOSIS — Z78.9 HISTORY OF MEASLES, MUMPS, RUBELLA (MMR) VACCINATION UNKNOWN: ICD-10-CM

## 2019-04-05 DIAGNOSIS — I10 ESSENTIAL HYPERTENSION: ICD-10-CM

## 2019-04-05 DIAGNOSIS — Z11.1 SCREENING-PULMONARY TB: ICD-10-CM

## 2019-04-05 DIAGNOSIS — Z78.9 UNKNOWN VARICELLA VACCINATION STATUS: ICD-10-CM

## 2019-04-05 DIAGNOSIS — I51.7 LVH (LEFT VENTRICULAR HYPERTROPHY): ICD-10-CM

## 2019-04-05 DIAGNOSIS — I27.20 MILD PULMONARY HYPERTENSION (HCC): ICD-10-CM

## 2019-04-05 PROCEDURE — 99214 OFFICE O/P EST MOD 30 MIN: CPT | Performed by: NURSE PRACTITIONER

## 2019-04-05 ASSESSMENT — PATIENT HEALTH QUESTIONNAIRE - PHQ9: CLINICAL INTERPRETATION OF PHQ2 SCORE: 0

## 2019-04-05 NOTE — ASSESSMENT & PLAN NOTE
Followed by cardiology, stable on current medication regimen.  Taking carvedilol 25 mg twice daily, spironolactone 25 mg daily, amlodipine 10 mg daily.  No lower extremity edema, activity intolerance, chest pain

## 2019-04-05 NOTE — PROGRESS NOTES
Subjective:     Chief Complaint   Patient presents with   • Other     GENERAL CHECK UP   • Immunizations     VACCINES AS NEEDED      Medina Gallo is a 28 y.o. male here today to follow up on chronic issues.  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    Essential hypertension  BP stable on medication  No chest pain, dizziness, sob    LVH (left ventricular hypertrophy)  Followed by cardiology, stable on current medication regimen.  Taking carvedilol 25 mg twice daily, spironolactone 25 mg daily, amlodipine 10 mg daily.  No lower extremity edema, activity intolerance, chest pain    Dyslipidemia  Controlled on atorvastatin, tolerating medication without difficulty    Mild pulmonary hypertension  Continues to use CPAP consistently, states that he is sleeping well       Current medicines (including changes today)  Current Outpatient Prescriptions   Medication Sig Dispense Refill   • atorvastatin (LIPITOR) 10 MG Tab Take 1 Tab by mouth every day for 360 days. 90 Tab 3   • metFORMIN (GLUCOPHAGE) 850 MG Tab Take 1 Tab by mouth 2 times a day, with meals for 360 days. 180 Tab 3   • carvedilol (COREG) 25 MG Tab Take 1 Tab by mouth 2 times a day, with meals. 60 Tab 11   • amLODIPine (NORVASC) 10 MG Tab Take 1 Tab by mouth every day. 30 Tab 11   • Multiple Vitamins-Minerals (MULTIVITAMIN ADULT PO) Take  by mouth every day.     • spironolactone (ALDACTONE) 25 MG Tab TAKE ONE TABLET BY MOUTH ONE TIME DAILY  30 Tab 11   • omeprazole (PRILOSEC) 20 MG delayed-release capsule Take 1 Cap by mouth every day. 30 Cap 0     No current facility-administered medications for this visit.      He  has a past medical history of Cold; Diabetes (HCC); Dyslipidemia (12/2/2015); Fatty liver; Gout; High cholesterol; Hypertension (2015); Liver mass; Obese; Sleep apnea; and Snoring. He also has no past medical history of Allergy.    ROS included above     Objective:     /70 (BP  "Location: Left arm, Patient Position: Sitting, BP Cuff Size: Adult)   Pulse 71   Temp 36.4 °C (97.6 °F) (Temporal)   Resp 16   Ht 1.651 m (5' 5\")   Wt 103.9 kg (229 lb)   SpO2 99%  Body mass index is 38.11 kg/m².     Physical Exam:  General: Alert, oriented in no acute distress.  Eye contact is good, speech is normal, affect calm  Lungs: clear to auscultation bilaterally, normal effort, no wheeze/ rhonchi/ rales.  CV: regular rate and rhythm, S1, S2, no murmur  Abdomen: soft, nontender  Ext: no edema, color normal, vascularity normal, temperature normal    Assessment and Plan:   The following treatment plan was discussed   1. Essential hypertension   stable on current medication regimen, continue follow-up with cardiology   2. LVH (left ventricular hypertrophy)   same as above   3. Dyslipidemia   controlled on atorvastatin, recent labs reviewed   4. Mild pulmonary hypertension (HCC)   continue CPAP   5. Unknown varicella vaccination status  VARICELLA ZOSTER IGG AB   6. Hepatitis B vaccination status unknown  HEP B SURFACE AB   7. Screening-pulmonary TB  Quantiferon Gold Plus TB (4 tube)   8. History of measles, mumps, rubella (MMR) vaccination unknown  MEASLES IGG ANTIBODY    RUBELLA ABS IGG    MUMPS AB IGG       Followup: pending labs         Please note that this dictation was created using voice recognition software. I have worked with consultants from the vendor as well as technical experts from [facility] to optimize the interface. I have made every reasonable attempt to correct obvious errors, but I expect that there are errors of grammar and possibly content that I did not discover before finalizing the note.       "

## 2019-04-23 ENCOUNTER — HOSPITAL ENCOUNTER (OUTPATIENT)
Dept: LAB | Facility: MEDICAL CENTER | Age: 29
End: 2019-04-23
Attending: NURSE PRACTITIONER
Payer: COMMERCIAL

## 2019-04-23 DIAGNOSIS — Z78.9 HEPATITIS B VACCINATION STATUS UNKNOWN: ICD-10-CM

## 2019-04-23 DIAGNOSIS — Z78.9 UNKNOWN VARICELLA VACCINATION STATUS: ICD-10-CM

## 2019-04-23 DIAGNOSIS — Z11.1 SCREENING-PULMONARY TB: ICD-10-CM

## 2019-04-23 DIAGNOSIS — Z78.9 HISTORY OF MEASLES, MUMPS, RUBELLA (MMR) VACCINATION UNKNOWN: ICD-10-CM

## 2019-04-23 LAB — HBV SURFACE AB SERPL IA-ACNC: 5.03 MIU/ML (ref 0–10)

## 2019-04-23 PROCEDURE — 86735 MUMPS ANTIBODY: CPT

## 2019-04-23 PROCEDURE — 36415 COLL VENOUS BLD VENIPUNCTURE: CPT

## 2019-04-23 PROCEDURE — 86762 RUBELLA ANTIBODY: CPT

## 2019-04-23 PROCEDURE — 86765 RUBEOLA ANTIBODY: CPT

## 2019-04-23 PROCEDURE — 86787 VARICELLA-ZOSTER ANTIBODY: CPT

## 2019-04-23 PROCEDURE — 86706 HEP B SURFACE ANTIBODY: CPT

## 2019-04-23 PROCEDURE — 86480 TB TEST CELL IMMUN MEASURE: CPT

## 2019-04-24 LAB
MEV IGG SER IA-ACNC: 0.86
MUV IGG SER IA-ACNC: 1.51
RUBV AB SER QL: 145.1 IU/ML
VZV IGG SER IA-ACNC: 1.88

## 2019-04-26 LAB
GAMMA INTERFERON BACKGROUND BLD IA-ACNC: 0.03 IU/ML
M TB IFN-G BLD-IMP: NEGATIVE
M TB IFN-G CD4+ BCKGRND COR BLD-ACNC: 0.02 IU/ML
MITOGEN IGNF BCKGRD COR BLD-ACNC: >10 IU/ML
QFT TB2 - NIL TBQ2: 0.04 IU/ML

## 2019-06-20 ENCOUNTER — OFFICE VISIT (OUTPATIENT)
Dept: VASCULAR LAB | Facility: MEDICAL CENTER | Age: 29
End: 2019-06-20
Attending: FAMILY MEDICINE
Payer: COMMERCIAL

## 2019-06-20 VITALS
BODY MASS INDEX: 39.04 KG/M2 | WEIGHT: 234.3 LBS | SYSTOLIC BLOOD PRESSURE: 130 MMHG | DIASTOLIC BLOOD PRESSURE: 63 MMHG | HEIGHT: 65 IN | HEART RATE: 53 BPM

## 2019-06-20 DIAGNOSIS — E78.5 DYSLIPIDEMIA: ICD-10-CM

## 2019-06-20 DIAGNOSIS — M10.9 GOUT, UNSPECIFIED CAUSE, UNSPECIFIED CHRONICITY, UNSPECIFIED SITE: ICD-10-CM

## 2019-06-20 DIAGNOSIS — I10 ESSENTIAL HYPERTENSION: ICD-10-CM

## 2019-06-20 DIAGNOSIS — G47.33 OSA (OBSTRUCTIVE SLEEP APNEA): ICD-10-CM

## 2019-06-20 DIAGNOSIS — E78.1 HYPERTRIGLYCERIDEMIA: ICD-10-CM

## 2019-06-20 DIAGNOSIS — R73.03 PREDIABETES: ICD-10-CM

## 2019-06-20 PROCEDURE — 99214 OFFICE O/P EST MOD 30 MIN: CPT | Performed by: FAMILY MEDICINE

## 2019-06-20 PROCEDURE — 99212 OFFICE O/P EST SF 10 MIN: CPT | Performed by: FAMILY MEDICINE

## 2019-06-20 ASSESSMENT — ENCOUNTER SYMPTOMS
TREMORS: 0
BRUISES/BLEEDS EASILY: 0
HEADACHES: 0
SEIZURES: 0
HEMOPTYSIS: 0
NAUSEA: 0
COUGH: 0
MYALGIAS: 0
VOMITING: 0
SHORTNESS OF BREATH: 0
ABDOMINAL PAIN: 0
FEVER: 0
PALPITATIONS: 0
CHILLS: 0
DIZZINESS: 0
WHEEZING: 0
WEAKNESS: 0
FOCAL WEAKNESS: 0
DIARRHEA: 0

## 2019-06-20 NOTE — PATIENT INSTRUCTIONS
1) continue all medications   2) check labs in 6mo     Physical activity:  - engage in moderate to vigorous physical activity such as brisk walking, swimming, cycling, >150 minutes per week at 30-40 minutes per session, 3 to 5 times weekly or as directed at today's visit     General healthly nutrition advice:  - the USDA food pattern (https://www.cnpp.usda.gov/USDAFoodPatterns)  - plate method (https://www.iMedicareplate.gov/)  - consume diet that emphasizes intake of vegetables, fruits, and whole grains,  - use low fat diary products, poultry, fish, legumes, nontropical vegetable oils, nuts  - limit intake of sweets, sugar-sweetned beverages, and red meats   - reduce saturated and trans fats to <6% of your daily calories   - consume no more than 2,400mg of sodium daily (look at food labels) or if you have high BP then reduce to no more than 1,500mg of sodium daily     BP lowering diet:   - DASH diet (https://www.heart.org/en/health-topics/high-blood-pressure/changes-you-can-make-to-manage-high-blood-pressure/managing-blood-pressure-with-a-heart-healthy-diet)    Cholesterol-reducing diets:   - AHA diet  (http://www.heart.org/en/healthy-living/healthy-eating/eat-smart/nutrition-basics/aha-diet-and-lifestyle-recommendations)   - Mediterranean diet (http://www.heart.org/en/healthy-living/healthy-eating/eat-smart/nutrition-basics/mediterranean-diet)   - lowering triglycerides: (http://my.americanheart.org/idc/groups/ahamah-public/@wc/@sop/@[facility]/documents/downloadable/[facility]_425988.pdf)

## 2019-06-20 NOTE — PROGRESS NOTES
" Resistant Hypertension Follow Up Visit  6/20/19    Assessment / Plan:   1. Blood Pressure Control:  ACC/AHA (2017) goal <130/80, but JNC8 <140/90 reasonable consider age   Home BP at goal:  yes  Office BP at goal:  yes  Plan:   - continue home BP monitoring, reviewed correct technique:  Yes   - order 24h ABPM:  Previously completed Under reasonable control on ABPM  - encouraged more home BP monitoring (large cuff); states he will try and take it, \"at grocery stores.\"    2. Work up of Secondary Causes of Resistant Hypertension:   Renovascular HTN: Renal artery duplex with no evidence or MEGAN (2015), but may not be best test for potential FMD  Primary Aldosteronism: Excluded ARR normal, No adrenal adenoma noted on previous imaging  Thyroid Function: Excluded - TSH WNL 9/9/17  Obstructive Sleep Apnea: Postive - started CPAP, stopped due to discomfort, mask broken  Pheochromocytoma: Excluded 24 hour urine normal July 2014  Instrinsic renal disease - normal GFR and kidneys unremarkable appearing on sono  Coarctation - not seen on previous imaging/angiogram  Recommendations At This Visit:     - consider MRA renal arteries in future if BP becomes more difficult to control    3. Medication Use / Adherence:  Assessment: Completely compliant  Recommendations: Instructed to Continue Taking All Medications As Prescribed- with the exception of adding metformin.         4. End Organ Damage:   Left Ventricular Hypertrophy: Present on  Echocardiogram Date: 2015  Albuminuria: improved 9/9/17: 36  Renal Function: Chronic Kidney Disease  Stage 2 at worst (GFR >60)  Established Cardiovascular Disease: None    5. Lifestyle Recommendations:    Smoking: continued complete avoidance of all tobacco products     Physical Activity: continue healthy activity to improve CV fitness, see care instructions for additional details     Weight Management and Nutrition: Dietary plan was discussed with patient at this visit including DASH, low sodium " and/or as outlined in care instructions     6. Standard HTN Pharmacotherapy:  ACEI/ARB:  Consider adding ACE or ARB in future if BP not controlled  Thiazide Type Diuretic: Avoid given gout and hypokalemia  Calcium Channel Blocker (CCB): continue amlodipine 10 mg daily    7. Additional Agents:  Beta Blocker - continue carvedilol 25mg BID   Mineralocorticoid Receptor Antagonist (MRA) - continue spironolactone 25 mg daily      8. Other CV Risk Factors:   Lipids - ASCVD risk >10%  LDLP # and small LDLP well control  TC still low, but improved  Plan:  - continue atorvastatin to 10mg once daily  - continue vit D 4000 units daily     Prediabetes, pmhx of borderline T2D (highest a1c = 6.6)  Last A1c = 5.5  Plan:  - continue healthy diet, weight reduction, daily physical activity   - continue metformin 850mg BID to slow or offset progression to T2D as per DPP trial   - monitor labs Q6-12mo    9. Other Issues:  Gout - no recent recurrence. 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 Ding.  LFTs stable   - defer all further w/u and management to GI and PCP    Gout:   Stable, no symptoms  - avoid thiazides, low purine diet, defer mgmt to PCP     Studies Ordered At This Visit:   None   Blood Work to Be Obtained Prior to Next Visit:  Cmp, lipid, a1c, cbc  Follow Up:  6mo with me     Diagnosis:  1. Essential hypertension  CBC WITHOUT DIFFERENTIAL   2. Dyslipidemia  Comp Metabolic Panel    Lipid Profile   3. GALILEA (obstructive sleep apnea)     4. Gout, unspecified cause, unspecified chronicity, unspecified site     5. Hypertriglyceridemia     6. BMI 38.0-38.9,adult     7. Prediabetes  HEMOGLOBIN A1C (Glycohemoglobin GHB Total/A1C with MBG Estimate)        History of Present Illness:   Medina Gallo is a 28 y.o. male who was seen 12/13/18 for f/u of hypertension,  "dyslidipidemia, [id number], gout and GALILEA    HTN:  Current HTN concerns: Denies   ADRs: No  Recent studies/labs completed (reviewed with patient, noted below): No  HTN sx:  No current blurred or changed vision, chest pain, shortness of breath, headache, nausea, dizziness/vertigo   Home BP log:  \"normal\" when donating platelets   24h ABPM completed: not tested  Adherence to current HTN meds: compliant all of the time     Anticoagulation/antiplats:   No    Hyperlipidemia:    Stable, no current concerns  Current treatment: atorva 10mg   Myalgias? No  Other adverse drug reactions? No  Lipid profile:     Lab Results  Component Value Date/Time   CHOLSTRLTOT 88 (L) 03/26/2019 1621   TRIGLYCERIDE 118 03/26/2019 1621   HDL 35 (A) 03/26/2019 1621   LDL 29 03/26/2019 1621     LDL (mg/dL)   Date Value   03/26/2019 29   12/12/2018 21     HDL (mg/dL)   Date Value   03/26/2019 35 (A)   12/12/2018 24 (A)     GALILEA:   Tried CPAP and had difficulty with mask, pending with pulm to review further     PreDM:   Tolerating meds, mild weight gain.  A1c much better.     Gout:   Stable. No gout flares    Social History   Substance Use Topics   • Smoking status: Never Smoker   • Smokeless tobacco: Never Used   • Alcohol use 0.0 oz/week      Comment: 1 per week     SOCIAL HISTORY  Diet - far less consistent.  Will try low carb, low sugar  Weight - up 25 pounds over the last yr  BMI Readings from Last 4 Encounters:   06/20/19 38.99 kg/m²   04/05/19 38.11 kg/m²   02/21/19 38.77 kg/m²   12/13/18 39.61 kg/m²      Exercise - joined a gym, has been doing cardio/wt lifting 3x/wk  No smoking      Review of Systems:   Review of Systems   Constitutional: Negative for chills, fever and malaise/fatigue.   Respiratory: Negative for cough, hemoptysis, shortness of breath and wheezing.    Cardiovascular: Negative for chest pain, palpitations and leg swelling.   Gastrointestinal: Negative for abdominal pain, diarrhea, nausea and vomiting.   Musculoskeletal: " "Negative for joint pain and myalgias.   Skin: Negative for itching and rash.   Neurological: Negative for dizziness, tremors, focal weakness, seizures, weakness and headaches.   Endo/Heme/Allergies: Does not bruise/bleed easily.        Objective:   Allergies:  Patient has no known allergies.    Vitals:    06/20/19 0857   BP: 130/63   BP Location: Left arm   Patient Position: Sitting   BP Cuff Size: Large adult   Pulse: (!) 53   Weight: 106.3 kg (234 lb 4.8 oz)   Height: 1.651 m (5' 5\")     BP Readings from Last 4 Encounters:   06/20/19 130/63   04/05/19 122/70   02/21/19 122/76   12/13/18 134/77      Body mass index is 38.99 kg/m².      Physical Exam   Constitutional: He is oriented to person, place, and time. He appears well-developed and well-nourished. He is cooperative. No distress.   HENT:   Head: Normocephalic and atraumatic.   Mouth/Throat: Oropharynx is clear and moist and mucous membranes are normal.   Eyes: Pupils are equal, round, and reactive to light. Conjunctivae are normal.   Neck: Trachea normal and normal range of motion. Neck supple. Normal carotid pulses and no JVD present. Carotid bruit is not present. No thyromegaly present.   Cardiovascular: Normal rate, regular rhythm, normal heart sounds and intact distal pulses.  Exam reveals no gallop and no friction rub.    No murmur heard.  Pulses:       Carotid pulses are 2+ on the right side, and 2+ on the left side.       Radial pulses are 2+ on the right side, and 2+ on the left side.        Dorsalis pedis pulses are 2+ on the right side, and 2+ on the left side.        Posterior tibial pulses are 2+ on the right side, and 2+ on the left side.   Edema:    RLE: none     LLE: none   Spider telangectasia:       RLE:  None      LLE: none   Varicosities:         RLE: none      LLE: none   Corona phlebectatica:      RLE:  None        LLE:  None   Cording:         RLE:  None     LLE: None    Pulmonary/Chest: Effort normal and breath sounds normal. No " respiratory distress.   Abdominal: Soft. Bowel sounds are normal. He exhibits no distension and no mass. There is no hepatosplenomegaly. There is no tenderness. There is no rebound and no guarding.   Musculoskeletal: He exhibits no edema.   Lymphadenopathy:     He has no cervical adenopathy.   Neurological: He is alert and oriented to person, place, and time. No cranial nerve deficit. Coordination and gait normal.   Skin: Skin is warm and dry. He is not diaphoretic. No cyanosis. No pallor. Nails show no clubbing.   Psychiatric: He has a normal mood and affect.        Accessory Clinical Findings:   Echocardiogram:  Results for orders placed or performed during the hospital encounter of 07/28/15   ECHOCARDIOGRAM COMP W/O CONT   Result Value Ref Range    Eject.Frac. MOD BP 59.37     Eject.Frac. MOD 4C 62.42     Eject.Frac. MOD 2C 60.22       Lab Results   Component Value Date    CHOLSTRLTOT 88 (L) 03/26/2019    LDL 29 03/26/2019    HDL 35 (A) 03/26/2019    TRIGLYCERIDE 118 03/26/2019    LDLPART See Note 09/09/2017    LDLPART See Note 09/09/2017    SMLLDL See Note 09/09/2017    LHDLPART See Note 09/09/2017    LVLDLPT See Note 09/09/2017    LDLCHOL 34 09/09/2017    HDLSIZE See Note 09/09/2017    VLDLSIZE  09/09/2017      Comment:      INTERPRETIVE INFORMATION: VLDL Particle Size, NMR  Percentiles in Reference Population:  25th       50th       75th  44.3       46.7       50.2      HDLPART  09/09/2017      Comment:      INTERPRETIVE INFORMATION: HDL Particle Number, NMR  Percentiles in Reference Population:  25th       50th       75th  29.7       33.0       36.8        Lab Results   Component Value Date    PROTHROMBTM 12.6 02/13/2018    INR 0.97 02/13/2018       Lab Results   Component Value Date    HBA1C 5.5 03/26/2019      Lab Results   Component Value Date    SODIUM 137 03/26/2019    POTASSIUM 3.9 03/26/2019    CHLORIDE 102 03/26/2019    CO2 26 03/26/2019    GLUCOSE 85 03/26/2019    BUN 17 03/26/2019    CREATININE 1.19  2019         Lab Results   Component Value Date    URCREAT 42.40 2019    MICROALBUR <0.7 2019    MALBCRT see below 2019     Lab Results   Component Value Date    STMTRPT  2018     [facility]    Test Date:  2018  Pt Name:    DAVE VERMA             Department: [facility]  MRN:        [id number]                      Room:  Gender:     Male                         Technician: SREDWIN  :        1990                   Requested By:MILAN BERMUDEZ  Order #:    [id number]                    Reading MD: Law Dupont MD    Measurements  Intervals                                Axis  Rate:       88                           P:          36  HI:         140                          QRS:        56  QRSD:       84                           T:          30  QT:         364  QTc:        441    Interpretive Statements  SINUS RHYTHM  Compared to ECG 2018 06:09:10  No significant changes    Electronically Signed On 2018 17:21:40 PDT by Law Dupont MD       Multiple imaging studies and lab reports were available in EMR and reviewed at today's visit    Pradip Moralez M.D.     Cc:   TATYANA Naidu

## 2019-07-29 DIAGNOSIS — I10 ESSENTIAL HYPERTENSION: ICD-10-CM

## 2019-07-29 RX ORDER — SPIRONOLACTONE 25 MG/1
25 TABLET ORAL DAILY
Qty: 30 TAB | Refills: 11 | Status: SHIPPED | OUTPATIENT
Start: 2019-07-29 | End: 2019-11-26 | Stop reason: SDUPTHER

## 2019-11-26 DIAGNOSIS — I10 ESSENTIAL HYPERTENSION: ICD-10-CM

## 2019-11-27 RX ORDER — CARVEDILOL 25 MG/1
25 TABLET ORAL 2 TIMES DAILY WITH MEALS
Qty: 60 TAB | Refills: 2 | Status: SHIPPED | OUTPATIENT
Start: 2019-11-27 | End: 2020-02-26 | Stop reason: SDUPTHER

## 2019-11-27 RX ORDER — AMLODIPINE BESYLATE 10 MG/1
10 TABLET ORAL DAILY
Qty: 30 TAB | Refills: 2 | Status: SHIPPED | OUTPATIENT
Start: 2019-11-27 | End: 2020-02-26 | Stop reason: SDUPTHER

## 2019-12-03 RX ORDER — ATORVASTATIN CALCIUM 10 MG/1
TABLET, FILM COATED ORAL
Qty: 30 TAB | Refills: 6 | Status: SHIPPED | OUTPATIENT
Start: 2019-12-03 | End: 2020-02-26 | Stop reason: SDUPTHER

## 2019-12-19 ENCOUNTER — APPOINTMENT (OUTPATIENT)
Dept: VASCULAR LAB | Facility: MEDICAL CENTER | Age: 29
End: 2019-12-19
Attending: INTERNAL MEDICINE
Payer: COMMERCIAL

## 2020-01-04 ENCOUNTER — HOSPITAL ENCOUNTER (OUTPATIENT)
Dept: LAB | Facility: MEDICAL CENTER | Age: 30
End: 2020-01-04
Attending: FAMILY MEDICINE
Payer: COMMERCIAL

## 2020-01-04 DIAGNOSIS — R73.03 PREDIABETES: ICD-10-CM

## 2020-01-04 DIAGNOSIS — E78.5 DYSLIPIDEMIA: ICD-10-CM

## 2020-01-04 LAB
ALBUMIN SERPL BCP-MCNC: 4.5 G/DL (ref 3.2–4.9)
ALBUMIN/GLOB SERPL: 1.3 G/DL
ALP SERPL-CCNC: 46 U/L (ref 30–99)
ALT SERPL-CCNC: 20 U/L (ref 2–50)
ANION GAP SERPL CALC-SCNC: 12 MMOL/L (ref 0–11.9)
AST SERPL-CCNC: 15 U/L (ref 12–45)
BILIRUB SERPL-MCNC: 0.6 MG/DL (ref 0.1–1.5)
BUN SERPL-MCNC: 19 MG/DL (ref 8–22)
CALCIUM SERPL-MCNC: 9.1 MG/DL (ref 8.5–10.5)
CHLORIDE SERPL-SCNC: 103 MMOL/L (ref 96–112)
CHOLEST SERPL-MCNC: 80 MG/DL (ref 100–199)
CO2 SERPL-SCNC: 22 MMOL/L (ref 20–33)
CREAT SERPL-MCNC: 1.23 MG/DL (ref 0.5–1.4)
EST. AVERAGE GLUCOSE BLD GHB EST-MCNC: 111 MG/DL
FASTING STATUS PATIENT QL REPORTED: NORMAL
GLOBULIN SER CALC-MCNC: 3.4 G/DL (ref 1.9–3.5)
GLUCOSE SERPL-MCNC: 95 MG/DL (ref 65–99)
HBA1C MFR BLD: 5.5 % (ref 0–5.6)
HDLC SERPL-MCNC: 32 MG/DL
LDLC SERPL CALC-MCNC: 13 MG/DL
POTASSIUM SERPL-SCNC: 3.7 MMOL/L (ref 3.6–5.5)
PROT SERPL-MCNC: 7.9 G/DL (ref 6–8.2)
SODIUM SERPL-SCNC: 137 MMOL/L (ref 135–145)
TRIGL SERPL-MCNC: 176 MG/DL (ref 0–149)

## 2020-01-04 PROCEDURE — 80053 COMPREHEN METABOLIC PANEL: CPT

## 2020-01-04 PROCEDURE — 83036 HEMOGLOBIN GLYCOSYLATED A1C: CPT

## 2020-01-04 PROCEDURE — 36415 COLL VENOUS BLD VENIPUNCTURE: CPT

## 2020-01-04 PROCEDURE — 80061 LIPID PANEL: CPT

## 2020-01-13 ENCOUNTER — OFFICE VISIT (OUTPATIENT)
Dept: VASCULAR LAB | Facility: MEDICAL CENTER | Age: 30
End: 2020-01-13
Attending: INTERNAL MEDICINE
Payer: COMMERCIAL

## 2020-01-13 VITALS
WEIGHT: 237.7 LBS | HEART RATE: 72 BPM | BODY MASS INDEX: 46.67 KG/M2 | SYSTOLIC BLOOD PRESSURE: 115 MMHG | HEIGHT: 60 IN | DIASTOLIC BLOOD PRESSURE: 67 MMHG

## 2020-01-13 DIAGNOSIS — M10.9 GOUT, UNSPECIFIED CAUSE, UNSPECIFIED CHRONICITY, UNSPECIFIED SITE: ICD-10-CM

## 2020-01-13 DIAGNOSIS — E78.1 HYPERTRIGLYCERIDEMIA: ICD-10-CM

## 2020-01-13 DIAGNOSIS — R73.03 PREDIABETES: ICD-10-CM

## 2020-01-13 DIAGNOSIS — G47.33 OSA (OBSTRUCTIVE SLEEP APNEA): ICD-10-CM

## 2020-01-13 DIAGNOSIS — I10 ESSENTIAL HYPERTENSION: ICD-10-CM

## 2020-01-13 DIAGNOSIS — E78.5 DYSLIPIDEMIA: ICD-10-CM

## 2020-01-13 PROCEDURE — 99214 OFFICE O/P EST MOD 30 MIN: CPT | Performed by: FAMILY MEDICINE

## 2020-01-13 PROCEDURE — 99212 OFFICE O/P EST SF 10 MIN: CPT | Performed by: FAMILY MEDICINE

## 2020-01-13 ASSESSMENT — ENCOUNTER SYMPTOMS
DIARRHEA: 0
SHORTNESS OF BREATH: 0
BRUISES/BLEEDS EASILY: 0
TREMORS: 0
SEIZURES: 0
PALPITATIONS: 0
COUGH: 0
ABDOMINAL PAIN: 0
FOCAL WEAKNESS: 0
WHEEZING: 0
CHILLS: 0
DIZZINESS: 0
HEMOPTYSIS: 0
VOMITING: 0
HEADACHES: 0
FEVER: 0
WEAKNESS: 0
MYALGIAS: 0
NAUSEA: 0

## 2020-01-13 NOTE — PROGRESS NOTES
" Resistant Hypertension Follow Up Visit  1/13/20    Assessment / Plan:   1. Blood Pressure Control:  ACC/AHA (2017) goal <130/80, but JNC8 <140/90 reasonable consider age   Home BP at goal:  yes  Office BP at goal:  yes  Plan:   - continue home BP monitoring, reviewed correct technique:  Yes   - order 24h ABPM:  Previously completed Under reasonable control on ABPM  - encouraged more home BP monitoring (large cuff); states he will try and take it, \"at grocery stores.\"    2. Work up of Secondary Causes of Resistant Hypertension:   Renovascular HTN: Renal artery duplex with no evidence or MEGAN (2015), but may not be best test for potential FMD  Primary Aldosteronism: Excluded ARR normal, No adrenal adenoma noted on previous imaging  Thyroid Function: Excluded - TSH WNL 9/9/17  Obstructive Sleep Apnea: Postive - started CPAP, stopped due to discomfort, mask broken, will re-referral back to Sleep MD and reinforced need for ongoing care to reduce risk of heart disease and BP worsening   Pheochromocytoma: Excluded 24 hour urine normal July 2014  Instrinsic renal disease - normal GFR and kidneys unremarkable appearing on sono  Coarctation - not seen on previous imaging/angiogram  Recommendations At This Visit:     - consider MRA renal arteries in future if BP becomes more difficult to control    3. Medication Use / Adherence:  Assessment: Completely compliant  Recommendations: Instructed to Continue Taking All Medications As Prescribed- with the exception of adding metformin.         4. End Organ Damage:   Left Ventricular Hypertrophy: Present on  Echocardiogram Date: 2015  Albuminuria: improved 9/9/17: 36  Renal Function: Chronic Kidney Disease  Stage 2 at worst (GFR >60)  Established Cardiovascular Disease: None    5. Lifestyle Recommendations:    Smoking: continued complete avoidance of all tobacco products     Physical Activity: continue healthy activity to improve CV fitness, see care instructions for additional " details     Weight Management and Nutrition: Dietary plan was discussed with patient at this visit including DASH, low sodium and/or as outlined in care instructions     6. Standard HTN Pharmacotherapy:  ACEI/ARB:  Consider adding ACE or ARB in future if BP not controlled  Thiazide Type Diuretic: Avoid given gout and hypokalemia  Calcium Channel Blocker (CCB): continue amlodipine 10 mg daily    7. Additional Agents:  Beta Blocker - continue carvedilol 25mg BID   Mineralocorticoid Receptor Antagonist (MRA) - continue spironolactone 25 mg daily   Peripheral alpha-blockers:  Not indicated at this time   Central alpha-agonists:  Not indicated   Direct vasodilators:  Not indicated   Other:  None      8. Other CV Risk Factors:   Lipids - ASCVD risk >10%  LDLP # and small LDLP well control  TC still low, but improved  Has elevated Trigs at baseline.  No hx of pancreatitis.   Plan:  - continue atorvastatin to 10mg once daily  - continue vit D 4000 units daily     Prediabetes, pmhx of borderline T2D (highest a1c = 6.6)  Last A1c = 5.5  1/2020  Plan:  - continue healthy diet, weight reduction, daily physical activity   - continue metformin 850mg BID to slow or offset progression to T2D as per DPP trial   - monitor labs Q6-12mo    9. Other Issues:  Gout - no recent recurrence. 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 Ding.  LFTs stable   - defer all further w/u and management to GI and PCP    Gout:   Stable, no symptoms  - avoid thiazides, low purine diet, defer mgmt to PCP     Studies Ordered At This Visit:   None   Blood Work to Be Obtained Prior to Next Visit:  Repeat 1/2021  Follow Up:  6mo with aprn     Diagnosis:  1. Essential hypertension  REFERRAL TO SLEEP STUDIES   2. Dyslipidemia     3. GALILEA (obstructive sleep apnea)  REFERRAL TO SLEEP STUDIES   4.  "Hypertriglyceridemia     5. Gout, unspecified cause, unspecified chronicity, unspecified site     6. BMI 38.0-38.9,adult     7. Prediabetes          History of Present Illness:   Medina Gallo is a male seen for f/u of hypertension, dyslidipidemia, ionngmnicvG1P, gout and GALILEA    HTN:  Current HTN concerns:  Unfortunately diet and exercise have worsened over time.  No other issue.   ADRs: No  HTN sx:  No current blurred or changed vision, chest pain, shortness of breath, headache, nausea, dizziness/vertigo   Home BP log:  \"normal\" when donating platelets bu reports no longer able to donate due to \"anti-E\" antibodies (?)  24h ABPM completed: not tested  Adherence to current HTN meds: compliant all of the time     Anticoagulation/antiplats:   No    Hyperlipidemia:    Stable, no current concerns  Current treatment: atorva 10mg   Myalgias? No  Other adverse drug reactions? No  Lipid profile: as noted     GALILEA:   No current usage, not complying with CPAP at this time.   No recent visits with sleep MD    PreDM:   Tolerating meds, mild weight gain.  A1c much better.     Gout:   Stable. No gout flares    Social History     Tobacco Use   • Smoking status: Never Smoker   • Smokeless tobacco: Never Used   Substance Use Topics   • Alcohol use: Yes     Alcohol/week: 0.0 oz     Comment: 1 per week   • Drug use: No     SOCIAL HISTORY  Diet - far less consistent.  Will try low carb, low sugar  Weight - up 25 pounds over the last yr  BMI Readings from Last 4 Encounters:   01/13/20 46.42 kg/m²   06/20/19 38.99 kg/m²   04/05/19 38.11 kg/m²   02/21/19 38.77 kg/m²      Exercise - joined a gym, has been doing cardio/wt lifting 3x/wk  No smoking      Review of Systems:   Review of Systems   Constitutional: Negative for chills, fever and malaise/fatigue.   Respiratory: Negative for cough, hemoptysis, shortness of breath and wheezing.    Cardiovascular: Negative for chest pain, palpitations and leg swelling.   Gastrointestinal: " Negative for abdominal pain, diarrhea, nausea and vomiting.   Musculoskeletal: Negative for joint pain and myalgias.   Skin: Negative for itching and rash.   Neurological: Negative for dizziness, tremors, focal weakness, seizures, weakness and headaches.   Endo/Heme/Allergies: Does not bruise/bleed easily.        Objective:   Allergies:  Patient has no known allergies.    Vitals:    01/13/20 0937   BP: 115/67   BP Location: Left arm   Patient Position: Sitting   BP Cuff Size: Large adult   Pulse: 72   Weight: 107.8 kg (237 lb 11.2 oz)   Height: 1.524 m (5')     BP Readings from Last 4 Encounters:   01/13/20 115/67   06/20/19 130/63   04/05/19 122/70   02/21/19 122/76      Body mass index is 46.42 kg/m².      Physical Exam   Constitutional: He is oriented to person, place, and time. He appears well-developed and well-nourished. He is cooperative. No distress.   HENT:   Head: Normocephalic and atraumatic.   Mouth/Throat: Oropharynx is clear and moist and mucous membranes are normal.   Eyes: Pupils are equal, round, and reactive to light. Conjunctivae are normal.   Neck: Trachea normal and normal range of motion. Neck supple. Normal carotid pulses and no JVD present. Carotid bruit is not present. No thyromegaly present.   Cardiovascular: Normal rate, regular rhythm, normal heart sounds and intact distal pulses. Exam reveals no gallop and no friction rub.   No murmur heard.  Pulses:       Carotid pulses are 2+ on the right side and 2+ on the left side.       Radial pulses are 2+ on the right side and 2+ on the left side.        Dorsalis pedis pulses are 2+ on the right side and 2+ on the left side.        Posterior tibial pulses are 2+ on the right side and 2+ on the left side.   Edema:    RLE: none     LLE: none   Spider telangectasia:       RLE:  None      LLE: none   Varicosities:         RLE: none      LLE: none   Corona phlebectatica:      RLE:  None        LLE:  None   Cording:         RLE:  None     LLE: None     Pulmonary/Chest: Effort normal and breath sounds normal. No respiratory distress.   Abdominal: Soft. Bowel sounds are normal. He exhibits no distension and no mass. There is no hepatosplenomegaly. There is no tenderness. There is no rebound and no guarding.   Musculoskeletal:         General: No edema.   Lymphadenopathy:     He has no cervical adenopathy.   Neurological: He is alert and oriented to person, place, and time. No cranial nerve deficit. Coordination and gait normal.   Skin: Skin is warm and dry. He is not diaphoretic. No cyanosis. No pallor. Nails show no clubbing.   Psychiatric: He has a normal mood and affect.        Accessory Clinical Findings:   Echocardiogram:  Results for orders placed or performed during the hospital encounter of 07/28/15   ECHOCARDIOGRAM COMP W/O CONT   Result Value Ref Range    Eject.Frac. MOD BP 59.37     Eject.Frac. MOD 4C 62.42     Eject.Frac. MOD 2C 60.22       Lab Results   Component Value Date    CHOLSTRLTOT 80 (L) 2020    LDL 13 2020    HDL 32 (A) 2020    TRIGLYCERIDE 176 (H) 2020      Lab Results   Component Value Date    PROTHROMBTM 12.6 2018    INR 0.97 2018       Lab Results   Component Value Date    HBA1C 5.5 2020      Lab Results   Component Value Date    SODIUM 137 2020    POTASSIUM 3.7 2020    CHLORIDE 103 2020    CO2 22 2020    GLUCOSE 95 2020    BUN 19 2020    CREATININE 1.23 2020         Lab Results   Component Value Date    URCREAT 42.40 2019    MICROALBUR <0.7 2019    MALBCRT see below 2019     Lab Results   Component Value Date    STMTRPT  2018     [facility]    Test Date:  2018  Pt Name:    DAVE LUCI             Department: [facility]  MRN:        [id number]                      Room:  Gender:     Male                         Technician: MARIO ALBERTO  :        1990                   Requested By:MILAN BERMUDEZ  Order #:    [id number]                     Reading MD: Law Dupont MD    Measurements  Intervals                                Axis  Rate:       88                           P:          36  PA:         140                          QRS:        56  QRSD:       84                           T:          30  QT:         364  QTc:        441    Interpretive Statements  SINUS RHYTHM  Compared to ECG 02/13/2018 06:09:10  No significant changes    Electronically Signed On 8- 17:21:40 PDT by Law Dupont MD       Multiple imaging studies and lab reports were available in EMR and reviewed at today's visit    Pradip Moralez M.D.     Cc:   TATYANA Naidu

## 2020-01-13 NOTE — PATIENT INSTRUCTIONS
- continue current meds   - continue to work on weight loss and consider meds and/or surgical options if worsening   - eval with sleep MD     Blood pressure and BP-lowering diet:  If you are being treated for blood pressure today: please be advised that stabilizing BP may require multiple med changes and close monitoring over the next several months and initially there may be additional imaging and labs and the possibility of adverse drug reactions. Variability of your blood pressure and heart rate may occur until we have things stabilized.  Please feel free to contact our office as needed for questions or concerns.      SODIUM RESTRICTIONS: - consume no more than 2,300mg of sodium daily (look at food labels) ,or if you have high BP then reduce to no more than 1,500mg of sodium daily   For more information visit: https://www.TrustAlert/contents/low-sodium-diet-the-basics/print?cxqcyNwc=7579&source=see_link     DASH diet   (https://www.heart.org/en/health-topics/high-blood-pressure/changes-you-can-make-to-manage-high-blood-pressure/managing-blood-pressure-with-a-heart-healthy-diet)    - if you notice BP > 140/>90 for more than 3 days, then contact our office for further instructions    Cholesterol-reducing diets:   - AHA diet  (http://www.heart.org/en/healthy-living/healthy-eating/eat-smart/nutrition-basics/aha-diet-and-lifestyle-recommendations)   - Mediterranean diet (http://www.heart.org/en/healthy-living/healthy-eating/eat-smart/nutrition-basics/mediterranean-diet)   - lowering triglycerides: (http://my.americanheart.org/idc/groups/ahamah-public/@wcm/@sop/@[facility]/documents/downloadable/[facility]_425988.pdf)     Physical activity: Unless directed otherwise at today's visit or you are physically incapable due to your current health or medical conditions, it is advised per the American Heart Association to engage in moderate to vigorous physical activity such as brisk walking, swimming, cycling, >150 minutes per week at  30-40 minutes per session, 3 to 5 times weekly.      General healthly nutrition advice:  - the USDA food pattern (https://www.cnpp.usda.gov/USDAFoodPatterns)  - plate method (https://www.choosemyplate.gov/)  - consume diet that emphasizes intake of vegetables, fruits, and whole grains,  - use low fat diary products, poultry, fish, legumes, nontropical vegetable oils, nuts  - limit intake of sweets, sugar-sweetned beverages, and red meats   - reduce saturated and trans fats to <6% of your daily calories

## 2020-02-10 ENCOUNTER — PATIENT MESSAGE (OUTPATIENT)
Dept: HEALTH INFORMATION MANAGEMENT | Facility: OTHER | Age: 30
End: 2020-02-10

## 2020-03-05 RX ORDER — ATORVASTATIN CALCIUM 10 MG/1
10 TABLET, FILM COATED ORAL DAILY
Qty: 30 TAB | Refills: 6 | Status: SHIPPED | OUTPATIENT
Start: 2020-03-05 | End: 2020-09-08 | Stop reason: SDUPTHER

## 2020-03-14 ENCOUNTER — OFFICE VISIT (OUTPATIENT)
Dept: URGENT CARE | Facility: CLINIC | Age: 30
End: 2020-03-14
Payer: COMMERCIAL

## 2020-03-14 VITALS
WEIGHT: 237 LBS | RESPIRATION RATE: 14 BRPM | OXYGEN SATURATION: 96 % | HEIGHT: 65 IN | BODY MASS INDEX: 39.49 KG/M2 | DIASTOLIC BLOOD PRESSURE: 72 MMHG | SYSTOLIC BLOOD PRESSURE: 124 MMHG | TEMPERATURE: 97.2 F | HEART RATE: 90 BPM

## 2020-03-14 DIAGNOSIS — J20.9 BRONCHOSPASM WITH BRONCHITIS, ACUTE: ICD-10-CM

## 2020-03-14 DIAGNOSIS — J22 LOWER RESPIRATORY INFECTION (E.G., BRONCHITIS, PNEUMONIA, PNEUMONITIS, PULMONITIS): ICD-10-CM

## 2020-03-14 PROCEDURE — 99204 OFFICE O/P NEW MOD 45 MIN: CPT | Mod: 25 | Performed by: INTERNAL MEDICINE

## 2020-03-14 PROCEDURE — 94640 AIRWAY INHALATION TREATMENT: CPT | Performed by: INTERNAL MEDICINE

## 2020-03-14 RX ORDER — ALBUTEROL SULFATE 90 UG/1
2 AEROSOL, METERED RESPIRATORY (INHALATION) EVERY 6 HOURS PRN
Qty: 8.5 G | Refills: 0 | Status: SHIPPED | OUTPATIENT
Start: 2020-03-14 | End: 2020-09-08 | Stop reason: SDUPTHER

## 2020-03-14 RX ORDER — IPRATROPIUM BROMIDE AND ALBUTEROL SULFATE 2.5; .5 MG/3ML; MG/3ML
3 SOLUTION RESPIRATORY (INHALATION) ONCE
Status: COMPLETED | OUTPATIENT
Start: 2020-03-14 | End: 2020-03-14

## 2020-03-14 RX ORDER — DEXAMETHASONE SODIUM PHOSPHATE 4 MG/ML
10 INJECTION, SOLUTION INTRA-ARTICULAR; INTRALESIONAL; INTRAMUSCULAR; INTRAVENOUS; SOFT TISSUE ONCE
Status: COMPLETED | OUTPATIENT
Start: 2020-03-14 | End: 2020-03-14

## 2020-03-14 RX ORDER — DOXYCYCLINE 100 MG/1
100 TABLET ORAL 2 TIMES DAILY
Qty: 14 TAB | Refills: 0 | Status: SHIPPED | OUTPATIENT
Start: 2020-03-14 | End: 2020-09-08

## 2020-03-14 RX ADMIN — DEXAMETHASONE SODIUM PHOSPHATE 10 MG: 4 INJECTION, SOLUTION INTRA-ARTICULAR; INTRALESIONAL; INTRAMUSCULAR; INTRAVENOUS; SOFT TISSUE at 14:06

## 2020-03-14 RX ADMIN — IPRATROPIUM BROMIDE AND ALBUTEROL SULFATE 3 ML: 2.5; .5 SOLUTION RESPIRATORY (INHALATION) at 14:07

## 2020-03-14 ASSESSMENT — ENCOUNTER SYMPTOMS
SHORTNESS OF BREATH: 0
SORE THROAT: 0
WHEEZING: 1
FEVER: 0
COUGH: 1

## 2020-03-14 ASSESSMENT — FIBROSIS 4 INDEX: FIB4 SCORE: 0.4

## 2020-03-14 NOTE — PROGRESS NOTES
Subjective:     Medina Gallo is a 29 y.o. male who presents for Cough (cough x 2 1/2 weeks)       Cough   This is a new problem. The current episode started 1 to 4 weeks ago. The problem has been gradually worsening. The problem occurs constantly. The cough is productive of sputum. Associated symptoms include nasal congestion and wheezing. Pertinent negatives include no fever, sore throat or shortness of breath.     Past Medical History:   Diagnosis Date   • Cold     July 23, had a cold for a week, denies SOB, productive cough   • Diabetes (HCC)     diet, oral meds   • Dyslipidemia 12/2/2015   • Fatty liver    • Gout    • High cholesterol    • Hypertension 2015   • Liver mass    • Obese    • Sleep apnea     Uses cpap   • Snoring      Past Surgical History:   Procedure Laterality Date   • GASTROSCOPY  8/24/2018    Procedure: GASTROSCOPY - POSS BIOPSY, DILATION, POLYPECTOMY, CONTROL OF HEMORRHAGE;  Surgeon: Munir Thomas M.D.;  Location: [facility];  Service: Gastroenterology   • EGD W/ENDOSCOPIC ULTRASOUND  8/24/2018    Procedure: EGD W/ENDOSCOPIC ULTRASOUND;  Surgeon: Munir Thomas M.D.;  Location: [facility];  Service: Gastroenterology   • EGD WITH ASP/BX  8/24/2018    Procedure: EGD WITH ASP/BX - FNA;  Surgeon: Munir Thomas M.D.;  Location: [facility];  Service: Gastroenterology   • HEPATIC ABLATION LAPAROSCOPIC  5/9/2013    Performed by Shai Ding M.D. at SURGERY [facility]   • HEPATIC ABLATION LAPAROSCOPIC  11/19/2012    Performed by Shai Ding M.D. at SURGERY [facility]   • RECOVERY  11/15/2012    Performed by -Recovery Surgery at SURGERY SAME DAY [facility]   • LIVER BIOPSY  11/9/12 and 11/15/12     Social History     Socioeconomic History   • Marital status: Single     Spouse name: Not on file   • Number of children: Not on file   • Years of education: Not on file   • Highest education level: Not  "on file   Occupational History   • Not on file   Social Needs   • Financial resource strain: Not on file   • Food insecurity     Worry: Not on file     Inability: Not on file   • Transportation needs     Medical: Not on file     Non-medical: Not on file   Tobacco Use   • Smoking status: Never Smoker   • Smokeless tobacco: Never Used   Substance and Sexual Activity   • Alcohol use: Yes     Alcohol/week: 0.0 oz     Comment: 1 per week   • Drug use: No   • Sexual activity: Not on file   Lifestyle   • Physical activity     Days per week: Not on file     Minutes per session: Not on file   • Stress: Not on file   Relationships   • Social connections     Talks on phone: Not on file     Gets together: Not on file     Attends Cheondoism service: Not on file     Active member of club or organization: Not on file     Attends meetings of clubs or organizations: Not on file     Relationship status: Not on file   • Intimate partner violence     Fear of current or ex partner: Not on file     Emotionally abused: Not on file     Physically abused: Not on file     Forced sexual activity: Not on file   Other Topics Concern   • Not on file   Social History Narrative   • Not on file      Family History   Problem Relation Age of Onset   • Hypertension Mother    • Heart Disease Father    • Hypertension Father    • Hypertension Sister    • Heart Disease Sister     Review of Systems   Constitutional: Negative for fever.   HENT: Negative for sore throat.    Respiratory: Positive for cough and wheezing. Negative for shortness of breath.    All other systems reviewed and are negative.  No Known Allergies   Objective:   /72 (BP Location: Left arm, Patient Position: Sitting, BP Cuff Size: Large adult)   Pulse 90   Temp 36.2 °C (97.2 °F) (Temporal)   Resp 14   Ht 1.651 m (5' 5\")   Wt 107.5 kg (237 lb)   SpO2 96%   BMI 39.44 kg/m²   Physical Exam  Constitutional:       General: He is not in acute distress.     Appearance: He is " well-developed.   HENT:      Head: Normocephalic and atraumatic.      Mouth/Throat:      Mouth: Mucous membranes are moist.      Pharynx: Oropharynx is clear.   Eyes:      Conjunctiva/sclera: Conjunctivae normal.   Neck:      Musculoskeletal: No neck rigidity.   Cardiovascular:      Rate and Rhythm: Normal rate and regular rhythm.   Pulmonary:      Effort: Respiratory distress present.      Breath sounds: Wheezing present.   Lymphadenopathy:      Cervical: No cervical adenopathy.   Skin:     General: Skin is warm and dry.      Capillary Refill: Capillary refill takes less than 2 seconds.   Neurological:      Mental Status: He is alert and oriented to person, place, and time.      Sensory: No sensory deficit.      Deep Tendon Reflexes: Reflexes are normal and symmetric.   Psychiatric:         Mood and Affect: Mood normal.         Behavior: Behavior normal.           Assessment/Plan:   Assessment    1. Lower respiratory infection (e.g., bronchitis, pneumonia, pneumonitis, pulmonitis)  - ipratropium-albuterol (DUONEB) nebulizer solution  - albuterol 108 (90 Base) MCG/ACT Aero Soln inhalation aerosol; Inhale 2 Puffs by mouth every 6 hours as needed for Shortness of Breath.  Dispense: 8.5 g; Refill: 0  - doxycycline monohydrate (ADOXA) 100 MG tablet; Take 1 Tab by mouth 2 times a day.  Dispense: 14 Tab; Refill: 0  - dexamethasone (DECADRON) injection 10 mg    2. Bronchospasm with bronchitis, acute  - ipratropium-albuterol (DUONEB) nebulizer solution  - albuterol 108 (90 Base) MCG/ACT Aero Soln inhalation aerosol; Inhale 2 Puffs by mouth every 6 hours as needed for Shortness of Breath.  Dispense: 8.5 g; Refill: 0  - doxycycline monohydrate (ADOXA) 100 MG tablet; Take 1 Tab by mouth 2 times a day.  Dispense: 14 Tab; Refill: 0  - dexamethasone (DECADRON) injection 10 mg    High risk conditions including pneumonia, flu, coronavirus was considered in the differential diagnosis      Differential diagnosis, natural history,  supportive care, and indications for immediate follow-up discussed.

## 2020-05-05 ENCOUNTER — SLEEP CENTER VISIT (OUTPATIENT)
Dept: SLEEP MEDICINE | Facility: MEDICAL CENTER | Age: 30
End: 2020-05-05
Payer: COMMERCIAL

## 2020-05-05 VITALS
OXYGEN SATURATION: 95 % | WEIGHT: 240 LBS | BODY MASS INDEX: 39.99 KG/M2 | HEART RATE: 72 BPM | HEIGHT: 65 IN | RESPIRATION RATE: 14 BRPM | SYSTOLIC BLOOD PRESSURE: 128 MMHG | DIASTOLIC BLOOD PRESSURE: 84 MMHG

## 2020-05-05 DIAGNOSIS — G47.33 OSA (OBSTRUCTIVE SLEEP APNEA): ICD-10-CM

## 2020-05-05 PROCEDURE — 99213 OFFICE O/P EST LOW 20 MIN: CPT | Performed by: FAMILY MEDICINE

## 2020-05-05 ASSESSMENT — FIBROSIS 4 INDEX: FIB4 SCORE: 0.4

## 2020-05-05 NOTE — PROGRESS NOTES
[facility] Follow Up Note     Date: 5/5/2020 / Time: 8:10 AM    Patient ID:   Name:             Medina Gallo   YOB: 1990  Age:                 29 y.o.  male   MRN:               [id number]      Thank you for requesting a sleep medicine consultation on Medina Gallo at the sleep center. He presents today with the chief complaints of GALILEA follow up.     HISTORY OF PRESENT ILLNESS:       Pt is currently suppose to be on CPAP 12 cm however he has not on been a CPAP for over a year. He was last seen on 10/6/17 . He has not been able to use the CPAP due to lack of supplies and torn mask. He goes to sleep around 10-11 pm and wakes up around 5:30 am. He is getting about 5-6 hrs of sleep on a good night and about 3-4 hr of sleep on a bad night. The bad nights are about 1 per week. Overall,  He does  finds his sleep refreshing. He rarely takes a nap. The symptoms of GALILEA has relpased.         SLEEP HISTORY   Split: Severe obstructive sleep apnea hypopnea with an apnea hypopnea index of 48.1 events per hour and a lowest arterial oxygen saturation of 89% on room air.  Fragmentation of sleep related in part to the breathing events and probably to laboratory effect as well.  There are periodic limb movements but they do not significantly affect sleep continuity.  There is an excellent response to CPAP therapy.    Home sleep study from April 2016 indicated an AHI of 35 and low oxygen of 77 %. Titration study indicated successful titration to CPAP 7cm with resultant AHI 2.4 and normal oximetry      REVIEW OF SYSTEMS:       Constitutional: Denies fevers, Denies weight changes  Eyes: Denies changes in vision, no eye pain  Ears/Nose/Throat/Mouth: Denies nasal congestion or sore throat   Cardiovascular: Denies chest pain or palpitations   Respiratory: Denies shortness of breath , + dry  cough  Gastrointestinal/Hepatic: Denies abdominal pain, nausea, vomiting,  Musculoskeletal/Rheum:  Denies  joint pain and swelling   Skin/Breast: Denies rash,   Neurological: Denies headache, confusion, memory loss or focal weakness/parasthesias  Psychiatric: denies mood disorder   Sleep: +snoring     Comprehensive review of systems form is reviewed with the patient and is attached in the EMR.     PMH:  has a past medical history of Cold, Diabetes (HCC), Dyslipidemia (12/2/2015), Fatty liver, Gout, High cholesterol, Hypertension (2015), Liver mass, Obese, Sleep apnea, and Snoring. He also has no past medical history of Allergy.  MEDS:   Current Outpatient Medications:   •  albuterol 108 (90 Base) MCG/ACT Aero Soln inhalation aerosol, Inhale 2 Puffs by mouth every 6 hours as needed for Shortness of Breath., Disp: 8.5 g, Rfl: 0  •  metFORMIN (GLUCOPHAGE) 850 MG Tab, Take 1 Tab by mouth 2 times a day, with meals., Disp: 60 Tab, Rfl: 6  •  atorvastatin (LIPITOR) 10 MG Tab, Take 1 Tab by mouth every day., Disp: 30 Tab, Rfl: 6  •  carvedilol (COREG) 25 MG Tab, Take 1 Tab by mouth 2 times a day, with meals., Disp: 180 Tab, Rfl: 1  •  amLODIPine (NORVASC) 10 MG Tab, Take 1 Tab by mouth every day., Disp: 90 Tab, Rfl: 1  •  spironolactone (ALDACTONE) 25 MG Tab, Take 1 Tab by mouth every day., Disp: 90 Tab, Rfl: 3  •  doxycycline monohydrate (ADOXA) 100 MG tablet, Take 1 Tab by mouth 2 times a day., Disp: 14 Tab, Rfl: 0  •  Multiple Vitamins-Minerals (MULTIVITAMIN ADULT PO), Take  by mouth every day., Disp: , Rfl:   ALLERGIES: No Known Allergies  SURGHX:   Past Surgical History:   Procedure Laterality Date   • GASTROSCOPY  8/24/2018    Procedure: GASTROSCOPY - POSS BIOPSY, DILATION, POLYPECTOMY, CONTROL OF HEMORRHAGE;  Surgeon: Munir Thomas M.D.;  Location: [facility];  Service: Gastroenterology   • EGD W/ENDOSCOPIC ULTRASOUND  8/24/2018    Procedure: EGD W/ENDOSCOPIC ULTRASOUND;  Surgeon: Munir Thomas M.D.;  Location: [facility];  Service: Gastroenterology   • EGD WITH ASP/BX   "8/24/2018    Procedure: EGD WITH ASP/BX - FNA;  Surgeon: Munir Thomas M.D.;  Location: SURGERY [facility];  Service: Gastroenterology   • HEPATIC ABLATION LAPAROSCOPIC  5/9/2013    Performed by Shai Ding M.D. at SURGERY [facility]   • HEPATIC ABLATION LAPAROSCOPIC  11/19/2012    Performed by Shai Ding M.D. at SURGERY [facility]   • RECOVERY  11/15/2012    Performed by [facility] at SURGERY SAME DAY [facility]   • LIVER BIOPSY  11/9/12 and 11/15/12     SOCHX:  reports that he has never smoked. He has never used smokeless tobacco. He reports current alcohol use. He reports that he does not use drugs..  FH:   Family History   Problem Relation Age of Onset   • Hypertension Mother    • Heart Disease Father    • Hypertension Father    • Hypertension Sister    • Heart Disease Sister          Physical Exam:  Vitals/ General Appearance:   Weight/BMI: Body mass index is 39.94 kg/m².  /84 (BP Location: Left arm, Patient Position: Sitting, BP Cuff Size: Adult)   Pulse 72   Resp 14   Ht 1.651 m (5' 5\")   Wt 108.9 kg (240 lb)   SpO2 95%   Vitals:    05/05/20 0803   BP: 128/84   BP Location: Left arm   Patient Position: Sitting   BP Cuff Size: Adult   Pulse: 72   Resp: 14   SpO2: 95%   Weight: 108.9 kg (240 lb)   Height: 1.651 m (5' 5\")       Pt. is alert and oriented to time, place and person. Cooperative and in no apparent distress.       -Head: Atraumatic, normocephalic.    -. Throat: Oropharynx appears crowded in that the palate is  overhanging  uvula is large, pharynx not inflamed.   -. Neck: Supple.-. Chest: Trachea central  -. Lungs auscultation: B/L good air entry, vesicular breath sounds, no adventitious sounds  -. Heart auscultation: 1st and 2nd heart sounds normal, regular rhythm. No appreciable murmur.  - Skin: No rash  - NEUROLOGICAL EXAMINATION: On neurological exam, the patient was alert and oriented x3. speech was clear and fluent without " dysarthria.      ASSESSMENT AND PLAN     1. Sleep Apnea .     He is urged to avoid supine sleep, weight gain and alcoholic beverages since all of these can worsen sleep apnea. He is cautioned against drowsy driving. If He feels sleepy while driving, He must pull over for a break/nap, rather than persist on the road, in the interest of He own safety and that of others on the road.   Plan   - Continue CPAP 12 cm with [employer]plus fullface mask   -changing DME to [facility]   - compliance download was reviewed and discussed with the pt   - compliance was reinforced     2. Regarding treatment of other past medical problems and general health maintenance,  He is urged to follow up with PCP.

## 2020-07-09 ASSESSMENT — ENCOUNTER SYMPTOMS
BRUISES/BLEEDS EASILY: 0
MYALGIAS: 0
SHORTNESS OF BREATH: 0
WEAKNESS: 0
VOMITING: 0
WHEEZING: 0
DIZZINESS: 0
ABDOMINAL PAIN: 0
CHILLS: 0
DIARRHEA: 0
HEADACHES: 0
COUGH: 0
TREMORS: 0
SEIZURES: 0
HEMOPTYSIS: 0
PALPITATIONS: 0
NAUSEA: 0
FEVER: 0
FOCAL WEAKNESS: 0

## 2020-07-13 ENCOUNTER — APPOINTMENT (OUTPATIENT)
Dept: VASCULAR LAB | Facility: MEDICAL CENTER | Age: 30
End: 2020-07-13
Payer: COMMERCIAL

## 2020-09-08 ENCOUNTER — OFFICE VISIT (OUTPATIENT)
Dept: MEDICAL GROUP | Facility: MEDICAL CENTER | Age: 30
End: 2020-09-08
Payer: COMMERCIAL

## 2020-09-08 VITALS
SYSTOLIC BLOOD PRESSURE: 130 MMHG | HEIGHT: 65 IN | TEMPERATURE: 97 F | DIASTOLIC BLOOD PRESSURE: 86 MMHG | WEIGHT: 246.91 LBS | HEART RATE: 84 BPM | RESPIRATION RATE: 18 BRPM | BODY MASS INDEX: 41.14 KG/M2 | OXYGEN SATURATION: 95 %

## 2020-09-08 DIAGNOSIS — E78.5 DYSLIPIDEMIA: ICD-10-CM

## 2020-09-08 DIAGNOSIS — I10 ESSENTIAL HYPERTENSION: ICD-10-CM

## 2020-09-08 DIAGNOSIS — R73.01 ELEVATED FASTING GLUCOSE: ICD-10-CM

## 2020-09-08 DIAGNOSIS — I27.20 MILD PULMONARY HYPERTENSION (HCC): ICD-10-CM

## 2020-09-08 DIAGNOSIS — Z13.29 THYROID DISORDER SCREEN: ICD-10-CM

## 2020-09-08 DIAGNOSIS — Z00.00 ANNUAL PHYSICAL EXAM: ICD-10-CM

## 2020-09-08 DIAGNOSIS — G47.33 OSA (OBSTRUCTIVE SLEEP APNEA): ICD-10-CM

## 2020-09-08 PROCEDURE — 99395 PREV VISIT EST AGE 18-39: CPT | Performed by: NURSE PRACTITIONER

## 2020-09-08 RX ORDER — ATORVASTATIN CALCIUM 10 MG/1
10 TABLET, FILM COATED ORAL DAILY
Qty: 90 TAB | Refills: 1 | Status: SHIPPED | OUTPATIENT
Start: 2020-09-08 | End: 2020-10-23

## 2020-09-08 RX ORDER — ALBUTEROL SULFATE 90 UG/1
2 AEROSOL, METERED RESPIRATORY (INHALATION) EVERY 6 HOURS PRN
Qty: 8.5 G | Refills: 0 | Status: SHIPPED | OUTPATIENT
Start: 2020-09-08 | End: 2020-09-08

## 2020-09-08 NOTE — ASSESSMENT & PLAN NOTE
Chronic issue controlled on carvedilol, amlodipine, spironolactone.  Followed by cardiology.  Blood pressure is reasonable in the office today

## 2020-09-08 NOTE — ASSESSMENT & PLAN NOTE
He has gained weight since last visit.  Admits to poor diet with COVID-19 pandemic, has not been exercising

## 2020-09-08 NOTE — ASSESSMENT & PLAN NOTE
Chronic issue managed with atorvastatin, tolerating medication without difficulty including myalgia

## 2020-09-08 NOTE — ASSESSMENT & PLAN NOTE
History of elevated glucose, on metformin which he is consistently taking.  Most recent A1c normal at 5.5

## 2020-09-08 NOTE — PROGRESS NOTES
Chief Complaint   Patient presents with   • Annual Exam     no concerns     Medina Gallo is a 30 y.o. male here to for annual exam    Essential hypertension  Chronic issue controlled on carvedilol, amlodipine, spironolactone.  Followed by cardiology.  Blood pressure is reasonable in the office today    GALILEA (obstructive sleep apnea)  Trying to get back to using CPAP more consistently    Dyslipidemia  Chronic issue managed with atorvastatin, tolerating medication without difficulty including myalgia    Mild pulmonary hypertension  Patient reports consistent use of CPAP    Elevated fasting glucose  History of elevated glucose, on metformin which he is consistently taking.  Most recent A1c normal at 5.5    BMI 40.0-44.9, adult (HCC)  He has gained weight since last visit.  Admits to poor diet with COVID-19 pandemic, has not been exercising    Current medicines (including changes today)  Current Outpatient Medications   Medication Sig Dispense Refill   • atorvastatin (LIPITOR) 10 MG Tab Take 1 Tab by mouth every day. 90 Tab 1   • metFORMIN (GLUCOPHAGE) 850 MG Tab Take 1 Tab by mouth 2 times a day, with meals. 180 Tab 1   • amLODIPine (NORVASC) 10 MG Tab TAKE ONE TABLET BY MOUTH ONE TIME DAILY  90 Tab 0   • carvedilol (COREG) 25 MG Tab TAKE ONE TABLET BY MOUTH TWICE DAILY WITH MEALS  180 Tab 0   • spironolactone (ALDACTONE) 25 MG Tab Take 1 Tab by mouth every day. 90 Tab 3     No current facility-administered medications for this visit.      He  has a past medical history of Cold, Diabetes (HCC), Dyslipidemia (12/2/2015), Fatty liver, Gout, High cholesterol, Hypertension (2015), Liver mass, Obese, Sleep apnea, and Snoring. He also has no past medical history of Allergy.  He  has a past surgical history that includes liver biopsy (11/9/12 and 11/15/12); recovery (11/15/2012); hepatic ablation laparoscopic (11/19/2012); hepatic ablation laparoscopic (5/9/2013); gastroscopy (8/24/2018); egd w/endoscopic  "ultrasound (2018); and egd with asp/bx (2018).  Social History     Tobacco Use   • Smoking status: Never Smoker   • Smokeless tobacco: Never Used   Substance Use Topics   • Alcohol use: Yes     Alcohol/week: 0.0 oz     Comment: 1 per week   • Drug use: No     Social History     Social History Narrative   • Not on file     Family History   Problem Relation Age of Onset   • Hypertension Mother    • Heart Disease Father    • Hypertension Father    • Hypertension Sister    • Heart Disease Sister      Family Status   Relation Name Status   • Mo  Alive   • Fa     • Sis  Alive   • Sis  Alive   • Sis  Alive   • Sis  Alive         ROS  Problems listed discussed above, all other systems reviewed and negative     Objective:     /86 (BP Location: Left arm, Patient Position: Sitting, BP Cuff Size: Large adult)   Pulse 84   Temp 36.1 °C (97 °F) (Temporal)   Resp 18   Ht 1.651 m (5' 5\")   Wt 112 kg (246 lb 14.6 oz)   SpO2 95%  Body mass index is 41.09 kg/m².  Physical Exam:  General: Alert, oriented in no acute distress.  Eye contact is good, speech is normal, affect calm  HEENT:  TMs gray with good landmarks bilaterally. No cervical or supraclavicular lymphadenopathy, thyroid isthmus palpable without masses or nodules.  Lungs: clear to auscultation bilaterally, good aeration, normal effort. No wheeze/ rhonchi/ rales.  CV: regular rate and rhythm, S1, S2. No murmur, no JVD, no edema. Pedal pulses 2 + bilaterally  Abdomen: soft, nontender, BS x4.  Central obesity  Ext: color normal, vascularity normal, temperature normal. No rash or lesions.    Assessment and Plan:   The following treatment plan was discussed   1. Annual physical exam  Normal physical exam. General health and wellness discussion including healthy diet, regular exercise. 2000 iu Vit d3 advised daily. Preventative health screenings -labs as listed below. Advised regular dental cleanings, eye exam yearly.     2. Essential hypertension   " stable, continue current medication.  Encouraged cardio exercise, weight loss  Comp Metabolic Panel   3. GALILEA (obstructive sleep apnea)   continue CPAP use   4. Dyslipidemia   continue statin  Lipid Profile   5. Mild pulmonary hypertension (HCC)   encourage CPAP use   6. Elevated fasting glucose   continue metformin   7. BMI 40.0-44.9, adult (HCC)   counseled on diet, exercise, weight loss recommendations   8. Thyroid disorder screen  TSH WITH REFLEX TO FT4       Educated in proper administration of medication(s) ordered today including safety, possible SE, risks, benefits, rationale and alternatives to therapy.       Followup: Pending labs             Please note that this dictation was created using voice recognition software. I have worked with consultants from the vendor as well as technical experts from [facility] [employer] to optimize the interface. I have made every reasonable attempt to correct obvious errors, but I expect that there are errors of grammar and possibly content that I did not discover before finalizing the note.

## 2020-09-22 ENCOUNTER — APPOINTMENT (OUTPATIENT)
Dept: VASCULAR LAB | Facility: MEDICAL CENTER | Age: 30
End: 2020-09-22
Payer: COMMERCIAL

## 2020-09-27 DIAGNOSIS — I10 ESSENTIAL HYPERTENSION: ICD-10-CM

## 2020-09-28 RX ORDER — SPIRONOLACTONE 25 MG/1
25 TABLET ORAL DAILY
Qty: 90 TAB | Refills: 1 | Status: SHIPPED | OUTPATIENT
Start: 2020-09-28 | End: 2020-10-23 | Stop reason: SDUPTHER

## 2020-09-28 NOTE — TELEPHONE ENCOUNTER
Received request via: Pharmacy    Was the patient seen in the last year in this department? Yes    Does the patient have an active prescription (recently filled or refills available) for medication(s) requested? No     Requested Prescriptions     Pending Prescriptions Disp Refills   • amLODIPine (NORVASC) 10 MG Tab 90 Tab 0     Sig: Take 1 Tab by mouth.   • carvedilol (COREG) 25 MG Tab 180 Tab 0     Sig: Take 1 Tab by mouth 2 times a day, with meals.

## 2020-09-29 RX ORDER — AMLODIPINE BESYLATE 10 MG/1
10 TABLET ORAL DAILY
Qty: 90 TAB | Refills: 1 | Status: SHIPPED | OUTPATIENT
Start: 2020-09-29 | End: 2020-10-23 | Stop reason: SDUPTHER

## 2020-09-29 RX ORDER — CARVEDILOL 25 MG/1
25 TABLET ORAL 2 TIMES DAILY WITH MEALS
Qty: 180 TAB | Refills: 1 | Status: SHIPPED | OUTPATIENT
Start: 2020-09-29 | End: 2020-10-23 | Stop reason: SDUPTHER

## 2020-10-21 ENCOUNTER — HOSPITAL ENCOUNTER (OUTPATIENT)
Dept: LAB | Facility: MEDICAL CENTER | Age: 30
End: 2020-10-21
Attending: NURSE PRACTITIONER
Payer: COMMERCIAL

## 2020-10-21 DIAGNOSIS — I10 ESSENTIAL HYPERTENSION: ICD-10-CM

## 2020-10-21 DIAGNOSIS — Z13.29 THYROID DISORDER SCREEN: ICD-10-CM

## 2020-10-21 DIAGNOSIS — E78.5 DYSLIPIDEMIA: ICD-10-CM

## 2020-10-21 LAB
ALBUMIN SERPL BCP-MCNC: 4.6 G/DL (ref 3.2–4.9)
ALBUMIN/GLOB SERPL: 1.7 G/DL
ALP SERPL-CCNC: 50 U/L (ref 30–99)
ALT SERPL-CCNC: 27 U/L (ref 2–50)
ANION GAP SERPL CALC-SCNC: 9 MMOL/L (ref 7–16)
AST SERPL-CCNC: 16 U/L (ref 12–45)
BILIRUB SERPL-MCNC: 0.5 MG/DL (ref 0.1–1.5)
BUN SERPL-MCNC: 21 MG/DL (ref 8–22)
CALCIUM SERPL-MCNC: 9.3 MG/DL (ref 8.5–10.5)
CHLORIDE SERPL-SCNC: 99 MMOL/L (ref 96–112)
CHOLEST SERPL-MCNC: 81 MG/DL (ref 100–199)
CO2 SERPL-SCNC: 25 MMOL/L (ref 20–33)
CREAT SERPL-MCNC: 1.21 MG/DL (ref 0.5–1.4)
FASTING STATUS PATIENT QL REPORTED: NORMAL
GLOBULIN SER CALC-MCNC: 2.7 G/DL (ref 1.9–3.5)
GLUCOSE SERPL-MCNC: 86 MG/DL (ref 65–99)
HDLC SERPL-MCNC: 29 MG/DL
LDLC SERPL CALC-MCNC: 27 MG/DL
POTASSIUM SERPL-SCNC: 4.1 MMOL/L (ref 3.6–5.5)
PROT SERPL-MCNC: 7.3 G/DL (ref 6–8.2)
SODIUM SERPL-SCNC: 133 MMOL/L (ref 135–145)
TRIGL SERPL-MCNC: 123 MG/DL (ref 0–149)
TSH SERPL DL<=0.005 MIU/L-ACNC: 1.19 UIU/ML (ref 0.38–5.33)

## 2020-10-21 PROCEDURE — 84443 ASSAY THYROID STIM HORMONE: CPT

## 2020-10-21 PROCEDURE — 36415 COLL VENOUS BLD VENIPUNCTURE: CPT

## 2020-10-21 PROCEDURE — 80061 LIPID PANEL: CPT

## 2020-10-21 PROCEDURE — 80053 COMPREHEN METABOLIC PANEL: CPT

## 2020-10-23 ENCOUNTER — OFFICE VISIT (OUTPATIENT)
Dept: VASCULAR LAB | Facility: MEDICAL CENTER | Age: 30
End: 2020-10-23
Attending: NURSE PRACTITIONER
Payer: COMMERCIAL

## 2020-10-23 VITALS
DIASTOLIC BLOOD PRESSURE: 83 MMHG | HEART RATE: 67 BPM | BODY MASS INDEX: 41.15 KG/M2 | WEIGHT: 247 LBS | SYSTOLIC BLOOD PRESSURE: 136 MMHG | HEIGHT: 65 IN

## 2020-10-23 DIAGNOSIS — E78.5 DYSLIPIDEMIA: ICD-10-CM

## 2020-10-23 DIAGNOSIS — I10 ESSENTIAL HYPERTENSION: ICD-10-CM

## 2020-10-23 DIAGNOSIS — R73.03 PREDIABETES: ICD-10-CM

## 2020-10-23 DIAGNOSIS — E11.9 TYPE 2 DIABETES MELLITUS WITHOUT COMPLICATION, WITHOUT LONG-TERM CURRENT USE OF INSULIN (HCC): ICD-10-CM

## 2020-10-23 PROCEDURE — 99214 OFFICE O/P EST MOD 30 MIN: CPT | Performed by: NURSE PRACTITIONER

## 2020-10-23 PROCEDURE — 99212 OFFICE O/P EST SF 10 MIN: CPT | Performed by: NURSE PRACTITIONER

## 2020-10-23 RX ORDER — SPIRONOLACTONE 25 MG/1
25 TABLET ORAL DAILY
Qty: 90 TAB | Refills: 3 | Status: SHIPPED | OUTPATIENT
Start: 2020-10-23 | End: 2021-06-02 | Stop reason: SDUPTHER

## 2020-10-23 RX ORDER — ATORVASTATIN CALCIUM 10 MG/1
5 TABLET, FILM COATED ORAL DAILY
Qty: 45 TAB | Refills: 1 | Status: SHIPPED | OUTPATIENT
Start: 2020-10-23 | End: 2021-04-09

## 2020-10-23 RX ORDER — AMLODIPINE BESYLATE 10 MG/1
10 TABLET ORAL DAILY
Qty: 90 TAB | Refills: 3 | Status: SHIPPED | OUTPATIENT
Start: 2020-10-23 | End: 2021-06-02 | Stop reason: SDUPTHER

## 2020-10-23 RX ORDER — CARVEDILOL 25 MG/1
25 TABLET ORAL 2 TIMES DAILY WITH MEALS
Qty: 180 TAB | Refills: 3 | Status: SHIPPED | OUTPATIENT
Start: 2020-10-23 | End: 2021-05-15 | Stop reason: SDUPTHER

## 2020-10-23 ASSESSMENT — ENCOUNTER SYMPTOMS
COUGH: 0
SHORTNESS OF BREATH: 0
WEIGHT LOSS: 0
FEVER: 0
MYALGIAS: 0
VOMITING: 0
BLURRED VISION: 0
ABDOMINAL PAIN: 0
WHEEZING: 0
BRUISES/BLEEDS EASILY: 0
CHILLS: 0
HEADACHES: 0
PALPITATIONS: 0
NAUSEA: 0
NERVOUS/ANXIOUS: 0
DOUBLE VISION: 0
DIARRHEA: 0
WEAKNESS: 0
DIZZINESS: 0

## 2020-10-23 NOTE — PROGRESS NOTES
Resistant Hypertension Follow Up Visit  10/23/20    Assessment / Plan:   1. Blood Pressure Control:  ACC/AHA (2017) goal <130/80  Home BP at goal:  yes  Office BP at goal:  no  Plan:   Slightly elevated in-office readings last 2 visits  - continue home BP monitoring, reviewed correct technique:  Yes + instructions and handout given today   - Under reasonable control previously on ABPM; could consider rpt in future  - encouraged home BP monitoring with upper-arm large cuff; pt will try to purchase in near future    2. Work up of Secondary Causes of Resistant Hypertension:   Renovascular HTN: Renal artery duplex with no evidence or MEGAN (2015), but may not be best test for potential FMD  Primary Aldosteronism: Excluded ARR normal, No adrenal adenoma noted on previous imaging  Thyroid Function: Excluded - TSH WNL 9/9/17  Obstructive Sleep Apnea: Postive - back on CPAP, doing well  Pheochromocytoma: Excluded 24 hour urine normal July 2014  Instrinsic renal disease - normal GFR and kidneys unremarkable appearing on sono  Coarctation - not seen on previous imaging/angiogram  Recommendations At This Visit:     - consider MRA renal arteries in future if BP becomes more difficult to control    3. Medication Use / Adherence:  Assessment: Completely compliant  Recommendations: Instructed to Continue Taking All Medications As Prescribed         4. End Organ Damage:   Left Ventricular Hypertrophy: Present on  Echocardiogram Date: 2015  Albuminuria: improved 9/9/17: 36  Renal Function: Chronic Kidney Disease  Stage 2 at worst (GFR >60)  Established Cardiovascular Disease: None    5. Lifestyle Recommendations:    Smoking: continued complete avoidance of all tobacco products     Physical Activity: encouraged more healthy activity to improve CV fitness    Weight Management and Nutrition: Dietary plan was discussed with patient at this visit including DASH, low sodium diet    6. Standard HTN Pharmacotherapy:  ACEI/ARB:  Consider  adding ACE or ARB in future if BP not controlled  Thiazide Type Diuretic: Avoid given gout and hypokalemia  Calcium Channel Blocker (CCB): continue amlodipine 10 mg daily    7. Additional Agents:  Beta Blocker - continue carvedilol 25mg BID   Mineralocorticoid Receptor Antagonist (MRA) - continue spironolactone 25 mg daily   Peripheral alpha-blockers:  Not indicated at this time   Central alpha-agonists:  Not indicated   Direct vasodilators:  Not indicated   Other:  None      8. Other CV Risk Factors:   Lipids - ASCVD risk >10%  LDLP # and small LDLP well control  TC and LDL still low  TG normal; elevated at baseline  10/21/20: NonHDL-52, LDL-27  Plan:  - decrease atorvastatin to 5mg once daily  - continue vit D 4000 units daily   - recheck lipids in 3mo    Prediabetes, pmhx of borderline T2D (highest a1c = 6.6)  Last A1c = 5.5  1/2020  Plan:  - encouraged more healthy diet, weight reduction, daily physical activity   - continue metformin 850mg BID to slow or offset progression to T2D as per DPP trial   - A1C with next labs    9. Other Issues:  Gout - no recent recurrence. 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 Ding.  LFTs stable   - defer all further w/u and management to GI and PCP    Gout:   Stable, no symptoms  - avoid thiazides, low purine diet, defer mgmt to PCP     Studies Ordered At This Visit:   None   Blood Work to Be Obtained Prior to Next Visit:  CMP, lipids, A1C  Follow Up:  3mo    Diagnosis:  1. Dyslipidemia  atorvastatin (LIPITOR) 10 MG Tab    Lipid Profile   2. Essential hypertension  spironolactone (ALDACTONE) 25 MG Tab    carvedilol (COREG) 25 MG Tab    amLODIPine (NORVASC) 10 MG Tab    Comp Metabolic Panel   3. Prediabetes  metFORMIN (GLUCOPHAGE) 850 MG Tab   4. Type 2 diabetes mellitus without complication, without long-term current  "use of insulin (HCC)  metFORMIN (GLUCOPHAGE) 850 MG Tab    Comp Metabolic Panel    HEMOGLOBIN A1C (Glycohemoglobin GHB Total/A1C with MBG Estimate)        History of Present Illness:   Medina Gallo is a male seen for f/u of hypertension, dyslidipidemia, [id number], gout and GALILEA    HTN:  Current HTN concerns:  None at present, although concerned with higher in-office readings  ADRs: No  HTN sx:  No current blurred or changed vision, chest pain, shortness of breath, headache, nausea, dizziness/vertigo   Home BP log:  \"normal\" when checking at pharmacies- no at-home testing.  24h ABPM completed: not tested  Adherence to current HTN meds: compliant all of the time     Anticoagulation/antiplats:   No    Hyperlipidemia:    Stable, no current concerns  Current treatment: atorva 10mg   Myalgias? No  Other adverse drug reactions? No  Lipid profile: low, as noted     GALILEA:   Much better compliance with CPAP at this time.   No recent visits with sleep MD    PreDM:   Tolerating meds, mild weight gain.  No recent A1C    Gout:   Stable. No gout flares    Social History     Tobacco Use   • Smoking status: Never Smoker   • Smokeless tobacco: Never Used   Substance Use Topics   • Alcohol use: Yes     Alcohol/week: 0.0 oz     Comment: 1 per week   • Drug use: No     SOCIAL HISTORY  Diet - far less consistent.  Will try low carb, low sugar  Weight - up 10lbs over the last 6mo  BMI Readings from Last 4 Encounters:   10/23/20 41.10 kg/m²   09/08/20 41.09 kg/m²   05/05/20 39.94 kg/m²   03/14/20 39.44 kg/m²      No smoking      Review of Systems:   Review of Systems   Constitutional: Negative for chills, fever, malaise/fatigue and weight loss.   Eyes: Negative for blurred vision and double vision.   Respiratory: Negative for cough, shortness of breath and wheezing.    Cardiovascular: Negative for chest pain, palpitations and leg swelling.   Gastrointestinal: Negative for abdominal pain, diarrhea, nausea and vomiting. " "  Musculoskeletal: Negative for joint pain and myalgias.   Skin: Negative for rash.   Neurological: Negative for dizziness, weakness and headaches.   Endo/Heme/Allergies: Does not bruise/bleed easily.   Psychiatric/Behavioral: The patient is not nervous/anxious.         Objective:   Allergies:  Patient has no known allergies.    Vitals:    10/23/20 1509 10/23/20 1515   BP: 137/77 136/83   BP Location: Left arm    Patient Position: Sitting    BP Cuff Size: Large adult    Pulse: 72 67   Weight: 112 kg (247 lb)    Height: 1.651 m (5' 5\")      BP Readings from Last 4 Encounters:   10/23/20 136/83   09/08/20 130/86   05/05/20 128/84   03/14/20 124/72      Body mass index is 41.1 kg/m².      Physical Exam   Constitutional: He is oriented to person, place, and time. He appears well-developed and well-nourished. He is cooperative. No distress.   HENT:   Head: Atraumatic.   Mouth/Throat: Mucous membranes are normal.   Neck: Trachea normal. Normal carotid pulses and no JVD present. Carotid bruit is not present.   Cardiovascular: Normal rate, regular rhythm and normal heart sounds.   No murmur heard.  Pulses:       Radial pulses are 2+ on the right side and 2+ on the left side.   Pulmonary/Chest: Effort normal and breath sounds normal. No respiratory distress.   Abdominal: There is no hepatosplenomegaly.   Musculoskeletal: Normal range of motion.         General: No edema.   Neurological: He is alert and oriented to person, place, and time. Coordination and gait normal.   Skin: Skin is warm and dry. He is not diaphoretic. No cyanosis. Nails show no clubbing.   Psychiatric: He has a normal mood and affect. His behavior is normal.        Accessory Clinical Findings:   Echocardiogram:  Results for orders placed or performed during the hospital encounter of 07/28/15   ECHOCARDIOGRAM COMP W/O CONT   Result Value Ref Range    Eject.Frac. MOD BP 59.37     Eject.Frac. MOD 4C 62.42     Eject.Frac. MOD 2C 60.22       Lab Results "   Component Value Date    CHOLSTRLTOT 81 (L) 10/21/2020    LDL 27 10/21/2020    HDL 29 (A) 10/21/2020    TRIGLYCERIDE 123 10/21/2020           Lab Results   Component Value Date    HBA1C 5.5 01/04/2020      Lab Results   Component Value Date    SODIUM 133 (L) 10/21/2020    POTASSIUM 4.1 10/21/2020    CHLORIDE 99 10/21/2020    CO2 25 10/21/2020    GLUCOSE 86 10/21/2020    BUN 21 10/21/2020    CREATININE 1.21 10/21/2020         Lab Results   Component Value Date    URCREAT 42.40 03/26/2019    MICROALBUR <0.7 03/26/2019    MALBCRT see below 03/26/2019        Multiple imaging studies and lab reports were available in EMR and reviewed at today's visit    MERCEDES Jacques     Cc:   TATYANA Naidu

## 2020-11-03 ENCOUNTER — OFFICE VISIT (OUTPATIENT)
Dept: URGENT CARE | Facility: CLINIC | Age: 30
End: 2020-11-03
Payer: COMMERCIAL

## 2020-11-03 VITALS
BODY MASS INDEX: 40.82 KG/M2 | HEIGHT: 65 IN | DIASTOLIC BLOOD PRESSURE: 82 MMHG | WEIGHT: 245 LBS | SYSTOLIC BLOOD PRESSURE: 120 MMHG | HEART RATE: 81 BPM | TEMPERATURE: 97.9 F | OXYGEN SATURATION: 96 % | RESPIRATION RATE: 18 BRPM

## 2020-11-03 DIAGNOSIS — M10.9 ACUTE GOUT OF RIGHT ANKLE, UNSPECIFIED CAUSE: ICD-10-CM

## 2020-11-03 PROCEDURE — 99214 OFFICE O/P EST MOD 30 MIN: CPT | Performed by: PHYSICIAN ASSISTANT

## 2020-11-03 RX ORDER — METHYLPREDNISOLONE 4 MG/1
TABLET ORAL
Qty: 21 TAB | Refills: 0 | Status: SHIPPED | OUTPATIENT
Start: 2020-11-03 | End: 2021-01-12

## 2020-11-03 RX ORDER — INDOMETHACIN 50 MG/1
50 CAPSULE ORAL 3 TIMES DAILY
Qty: 60 CAP | Refills: 0 | Status: SHIPPED | OUTPATIENT
Start: 2020-11-03 | End: 2020-11-10

## 2020-11-03 ASSESSMENT — ENCOUNTER SYMPTOMS
ABDOMINAL PAIN: 0
TINGLING: 0
DIAPHORESIS: 0
WEAKNESS: 0
PALPITATIONS: 0
FEVER: 0
NAUSEA: 0
VOMITING: 0
CHILLS: 0
SENSORY CHANGE: 0
DIZZINESS: 0
SHORTNESS OF BREATH: 0
COUGH: 0
HEADACHES: 0

## 2020-11-03 NOTE — PROGRESS NOTES
Subjective:   Medina Gallo is a 30 y.o. male who presents for Gout (started this morning flare up, right ankle)      HPI:  This is a very pleasant 30-year-old male presents the clinic with acute nontraumatic right ankle pain.  Patient has a past medical history significant for gout.  He has had multiple attacks in the past states this feels similar.  His right ankle is red, tender and swollen.  Pain is made worse with palpation and weightbearing.  Denies any numbness or tingling to the toes.  He ate a big burger the other night and believes this may have set off the attack.  He has responded well to steroids in the past.  He denies any constitutional symptoms such as fevers, chills, myalgias, nausea or vomiting.    Review of Systems   Constitutional: Negative for chills, diaphoresis, fever and malaise/fatigue.   Respiratory: Negative for cough and shortness of breath.    Cardiovascular: Negative for chest pain and palpitations.   Gastrointestinal: Negative for abdominal pain, nausea and vomiting.   Musculoskeletal: Positive for joint pain.        Right ankle pain, swelling, redness and warmth   Neurological: Negative for dizziness, tingling, sensory change, weakness and headaches.       Medications:    • amLODIPine Tabs  • atorvastatin Tabs  • carvedilol Tabs  • indomethacin Caps  • metFORMIN Tabs  • methylPREDNISolone Tbpk  • spironolactone Tabs    Allergies: Patient has no known allergies.    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    Surgical History:  Past Surgical History:   Procedure Laterality Date   • GASTROSCOPY  8/24/2018     "Procedure: GASTROSCOPY - POSS BIOPSY, DILATION, POLYPECTOMY, CONTROL OF HEMORRHAGE;  Surgeon: Munir Thomas M.D.;  Location: [facility];  Service: Gastroenterology   • EGD W/ENDOSCOPIC ULTRASOUND  8/24/2018    Procedure: EGD W/ENDOSCOPIC ULTRASOUND;  Surgeon: Munir Thomas M.D.;  Location: [facility];  Service: Gastroenterology   • EGD WITH ASP/BX  8/24/2018    Procedure: EGD WITH ASP/BX - FNA;  Surgeon: Munir Thomas M.D.;  Location: [facility];  Service: Gastroenterology   • HEPATIC ABLATION LAPAROSCOPIC  5/9/2013    Performed by Shai Ding M.D. at SURGERY [facility]   • HEPATIC ABLATION LAPAROSCOPIC  11/19/2012    Performed by Shai Ding M.D. at SURGERY [facility]   • RECOVERY  11/15/2012    Performed by [facility] at SURGERY SAME DAY [facility]   • LIVER BIOPSY  11/9/12 and 11/15/12       Past Social Hx: Medina Gallo  reports that he has never smoked. He has never used smokeless tobacco. He reports current alcohol use. He reports that he does not use drugs.     Past Family Hx:  Medina Gallo family history includes Heart Disease in his father and sister; Hypertension in his father, mother, and sister.     Problem list, medications, and allergies reviewed by myself today in Epic.     Objective:     /82 (BP Location: Left arm, Patient Position: Sitting, BP Cuff Size: Large adult)   Pulse 81   Temp 36.6 °C (97.9 °F) (Temporal)   Resp 18   Ht 1.651 m (5' 5\")   Wt 111.1 kg (245 lb)   SpO2 96%   BMI 40.77 kg/m²     Physical Exam  Constitutional:       General: He is not in acute distress.     Appearance: Normal appearance. He is not ill-appearing, toxic-appearing or diaphoretic.   HENT:      Head: Normocephalic and atraumatic.   Eyes:      Conjunctiva/sclera: Conjunctivae normal.   Neck:      Musculoskeletal: Normal range of motion. No neck rigidity.   Cardiovascular:      " Rate and Rhythm: Normal rate and regular rhythm.      Pulses: Normal pulses.      Heart sounds: Normal heart sounds.   Pulmonary:      Effort: Pulmonary effort is normal.      Breath sounds: Normal breath sounds.   Abdominal:      General: Bowel sounds are normal.   Musculoskeletal:      Comments: Right ankle:  Mild edema and erythema.  Diffusely tender to palpation.  Tender to range of motion.  Pain with weightbearing.  Capillary refill less than 2 seconds.  DP and PT pulses 2+.   Lymphadenopathy:      Cervical: No cervical adenopathy.   Skin:     General: Skin is warm.      Capillary Refill: Capillary refill takes less than 2 seconds.      Findings: Erythema (Right ankle) present.   Neurological:      General: No focal deficit present.      Mental Status: He is alert and oriented to person, place, and time. Mental status is at baseline.           Assessment/Plan:     Diagnosis and associated orders:     1. Acute gout of right ankle, unspecified cause  methylPREDNISolone (MEDROL DOSEPAK) 4 MG Tablet Therapy Pack    indomethacin (INDOCIN) 50 MG Cap      Comments/MDM:       • History and physical exam findings are consistent with an acute gout flare.  • Patient has responded well to steroids in the past.  We will start him on Medrol Dosepak.  Take with food.  • May take indomethacin 3 times daily for 1 week as needed.  • AVS with diet recommendations provided for the patient.  • Encouraged to call with any questions or concerns.  Return to the clinic for any persistence or worsening of symptoms.           Differential diagnosis, natural history, supportive care, and indications for immediate follow-up discussed.    Advised the patient to follow-up with the primary care physician for recheck, reevaluation, and consideration of further management.    Please note that this dictation was created using voice recognition software. I have made reasonable attempt to correct obvious errors, but I expect that there are errors  of grammar and possibly content that I did not discover before finalizing the note.    This note was electronically signed by ESTEVAN Pulido PA-C

## 2021-01-09 ENCOUNTER — HOSPITAL ENCOUNTER (OUTPATIENT)
Dept: LAB | Facility: MEDICAL CENTER | Age: 31
End: 2021-01-09
Attending: NURSE PRACTITIONER
Payer: COMMERCIAL

## 2021-01-09 DIAGNOSIS — I10 ESSENTIAL HYPERTENSION: ICD-10-CM

## 2021-01-09 DIAGNOSIS — E78.5 DYSLIPIDEMIA: ICD-10-CM

## 2021-01-09 DIAGNOSIS — E11.9 TYPE 2 DIABETES MELLITUS WITHOUT COMPLICATION, WITHOUT LONG-TERM CURRENT USE OF INSULIN (HCC): ICD-10-CM

## 2021-01-09 LAB
ALBUMIN SERPL BCP-MCNC: 4.8 G/DL (ref 3.2–4.9)
ALBUMIN/GLOB SERPL: 1.6 G/DL
ALP SERPL-CCNC: 53 U/L (ref 30–99)
ALT SERPL-CCNC: 48 U/L (ref 2–50)
ANION GAP SERPL CALC-SCNC: 11 MMOL/L (ref 7–16)
AST SERPL-CCNC: 23 U/L (ref 12–45)
BILIRUB SERPL-MCNC: 0.6 MG/DL (ref 0.1–1.5)
BUN SERPL-MCNC: 20 MG/DL (ref 8–22)
CALCIUM SERPL-MCNC: 9.5 MG/DL (ref 8.5–10.5)
CHLORIDE SERPL-SCNC: 100 MMOL/L (ref 96–112)
CHOLEST SERPL-MCNC: 103 MG/DL (ref 100–199)
CO2 SERPL-SCNC: 22 MMOL/L (ref 20–33)
CREAT SERPL-MCNC: 1.22 MG/DL (ref 0.5–1.4)
EST. AVERAGE GLUCOSE BLD GHB EST-MCNC: 114 MG/DL
FASTING STATUS PATIENT QL REPORTED: NORMAL
GLOBULIN SER CALC-MCNC: 3 G/DL (ref 1.9–3.5)
GLUCOSE SERPL-MCNC: 87 MG/DL (ref 65–99)
HBA1C MFR BLD: 5.6 % (ref 0–5.6)
HDLC SERPL-MCNC: 28 MG/DL
LDLC SERPL CALC-MCNC: 22 MG/DL
POTASSIUM SERPL-SCNC: 4.1 MMOL/L (ref 3.6–5.5)
PROT SERPL-MCNC: 7.8 G/DL (ref 6–8.2)
SODIUM SERPL-SCNC: 133 MMOL/L (ref 135–145)
TRIGL SERPL-MCNC: 267 MG/DL (ref 0–149)

## 2021-01-09 PROCEDURE — 83036 HEMOGLOBIN GLYCOSYLATED A1C: CPT

## 2021-01-09 PROCEDURE — 80053 COMPREHEN METABOLIC PANEL: CPT

## 2021-01-09 PROCEDURE — 80061 LIPID PANEL: CPT

## 2021-01-09 PROCEDURE — 36415 COLL VENOUS BLD VENIPUNCTURE: CPT

## 2021-01-11 ASSESSMENT — ENCOUNTER SYMPTOMS
NERVOUS/ANXIOUS: 0
MYALGIAS: 0
WEIGHT LOSS: 0
WEAKNESS: 0
PALPITATIONS: 0
NAUSEA: 0
VOMITING: 0
CHILLS: 0
ABDOMINAL PAIN: 0
BLURRED VISION: 0
FEVER: 0
DOUBLE VISION: 0
COUGH: 0
BRUISES/BLEEDS EASILY: 0
WHEEZING: 0
DIZZINESS: 0
HEADACHES: 0
DIARRHEA: 0

## 2021-01-12 ENCOUNTER — OFFICE VISIT (OUTPATIENT)
Dept: VASCULAR LAB | Facility: MEDICAL CENTER | Age: 31
End: 2021-01-12
Attending: INTERNAL MEDICINE
Payer: COMMERCIAL

## 2021-01-12 DIAGNOSIS — E78.1 HYPERTRIGLYCERIDEMIA: ICD-10-CM

## 2021-01-12 DIAGNOSIS — I10 ESSENTIAL HYPERTENSION: ICD-10-CM

## 2021-01-12 DIAGNOSIS — E87.6 HYPOKALEMIA: ICD-10-CM

## 2021-01-12 DIAGNOSIS — R73.01 ELEVATED FASTING GLUCOSE: ICD-10-CM

## 2021-01-12 DIAGNOSIS — E78.5 DYSLIPIDEMIA: ICD-10-CM

## 2021-01-12 PROCEDURE — 99213 OFFICE O/P EST LOW 20 MIN: CPT | Mod: 95,CR | Performed by: NURSE PRACTITIONER

## 2021-01-12 ASSESSMENT — ENCOUNTER SYMPTOMS
SHORTNESS OF BREATH: 1
BLOOD IN STOOL: 0

## 2021-01-12 NOTE — PROGRESS NOTES
This visit was conducted via [employer] video call using secure and encrypted video conferencing technology due to covid-19 restrictions.   The patient was in a private location in the state of [place].    The patient's identity was confirmed and verbal consent was obtained for this virtual visit.    Resistant Hypertension Follow Up Visit  1/12/21    Assessment / Plan:   1. Blood Pressure Control:  ACC/AHA (2017) goal <130/80  Home BP at goal:  yes  Office BP at goal: Unsure virtual visit    nakita:   Slightly elevated in-office readings last 2 visits  - continue home BP monitoring, reviewed correct technique:  Yes + instructions and handout given today   - Under reasonable control previously on ABPM; could consider rpt in future  - encouraged home BP monitoring with upper-arm large cuff; pt will try to purchase in near future    2. Work up of Secondary Causes of Resistant Hypertension:   Renovascular HTN: Renal artery duplex with no evidence or MEGAN (2015), but may not be best test for potential FMD  Primary Aldosteronism: Excluded ARR normal, No adrenal adenoma noted on previous imaging  Thyroid Function: Excluded - TSH WNL 9/9/17  Obstructive Sleep Apnea: Postive - back on CPAP, doing well  Pheochromocytoma: Excluded 24 hour urine normal July 2014  Instrinsic renal disease - normal GFR and kidneys unremarkable appearing on sono  Coarctation - not seen on previous imaging/angiogram  Recommendations At This Visit:     - consider MRA renal arteries in future if BP becomes more difficult to control    3. Medication Use / Adherence:  Assessment: Completely compliant  Recommendations: Instructed to Continue Taking All Medications As Prescribed         4. End Organ Damage:   Left Ventricular Hypertrophy: Present on  Echocardiogram Date: 2015  Albuminuria: improved 9/9/17: 36  Renal Function: Chronic Kidney Disease  Stage 2 at worst (GFR >60)  Established Cardiovascular Disease: None    5. Lifestyle  Recommendations:    Smoking: continued complete avoidance of all tobacco products     Physical Activity: encouraged more healthy activity to improve CV fitness    Weight Management and Nutrition: Dietary plan was discussed with patient at this visit including DASH, low sodium diet    6. Standard HTN Pharmacotherapy:  ACEI/ARB:  Consider adding ACE or ARB in future if BP not controlled  Thiazide Type Diuretic: Avoid given gout and hypokalemia  Calcium Channel Blocker (CCB): continue amlodipine 10 mg daily    7. Additional Agents:  Beta Blocker - continue carvedilol 25mg BID   Mineralocorticoid Receptor Antagonist (MRA) - continue spironolactone 25 mg daily   Peripheral alpha-blockers:  Not indicated at this time   Central alpha-agonists:  Not indicated   Direct vasodilators:  Not indicated   Other:  None      8. Other CV Risk Factors:   Lipids - ASCVD risk >10%  LDLP # and small LDLP well control  TC and LDL still low  TG normal; elevated at baseline  10/21/20: NonHDL-52, LDL-27  Plan:  - decrease atorvastatin to 5mg every other day  - continue vit D 4000 units daily   - recheck lipids in 3 months  -Triglyceride handout sent to home    Prediabetes, pmhx of borderline T2D (highest a1c = 6.6)  Last A1c = 5.5  1/2020  Plan:  - encouraged more healthy diet, weight reduction, daily physical activity   - continue metformin 850mg BID to slow or offset progression to T2D as per DPP trial   - A1C with next labs    9. Other Issues:  Gout - no recent recurrence. 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 Ding.  LFTs stable   - defer all further w/u and management to GI and PCP    Time: 30min, chart prep, lab review, face-2-face time, documentation, providing lab orders.    Studies Ordered At This Visit:   None   Blood Work to Be Obtained Prior to  "Next Visit:  CMP, lipids, MA  Follow Up:  3mo    Diagnosis:  1. Dyslipidemia     2. Elevated fasting glucose     3. Essential hypertension     4. Hypertriglyceridemia     5. Hypokalemia          History of Present Illness:   Medina Gallo is a male seen for f/u of hypertension, dyslidipidemia, [id number], gout and GALILEA    HTN:  Current HTN concerns:  None at present, although concerned with higher in-office readings  ADRs: No  HTN sx:  No current blurred or changed vision, chest pain, shortness of breath, headache, nausea, dizziness/vertigo   Home BP log:  \"normal\" when checking at pharmacies- no at-home testing.  24h ABPM completed: not tested  Adherence to current HTN meds: compliant all of the time     Anticoagulation/antiplats:   No    Hyperlipidemia:    Stable, no current concerns  Current treatment: atorva 10mg   Myalgias? No  Other adverse drug reactions? No  Lipid profile: low, as noted     GALILEA:   Much better compliance with CPAP at this time.   No recent visits with sleep MD    PreDM:   Tolerating meds, mild weight gain.  No recent A1C    Gout:   Stable. No gout flares    Social History     Tobacco Use   • Smoking status: Never Smoker   • Smokeless tobacco: Never Used   Substance Use Topics   • Alcohol use: Yes     Alcohol/week: 0.0 oz     Comment: 1 per week   • Drug use: No     SOCIAL HISTORY  Diet - far less consistent.  Will try low carb, low sugar  Weight - up 10lbs over the last 6mo  BMI Readings from Last 4 Encounters:   11/03/20 40.77 kg/m²   10/23/20 41.10 kg/m²   09/08/20 41.09 kg/m²   05/05/20 39.94 kg/m²      No smoking      Review of Systems:   Review of Systems   Constitutional: Negative for chills, fever, malaise/fatigue and weight loss.   Eyes: Negative for blurred vision and double vision.   Respiratory: Positive for shortness of breath (covid related). Negative for cough and wheezing.    Cardiovascular: Negative for chest pain, palpitations and leg swelling. "   Gastrointestinal: Negative for abdominal pain, blood in stool, diarrhea, nausea and vomiting.   Genitourinary: Negative for hematuria.   Musculoskeletal: Negative for joint pain and myalgias.   Skin: Negative for rash.   Neurological: Negative for dizziness, weakness and headaches.   Endo/Heme/Allergies: Does not bruise/bleed easily.   Psychiatric/Behavioral: The patient is not nervous/anxious.    This visit was conducted via [employer] video call using secure and encrypted video conferencing technology due to covid-19 restrictions.   The patient was in a private location in the [facility].    The patient's identity was confirmed and verbal consent was obtained for this virtual visit.       Objective:   Allergies:  Patient has no known allergies.    There were no vitals filed for this visit.  BP Readings from Last 4 Encounters:   11/03/20 120/82   10/23/20 136/83   09/08/20 130/86   05/05/20 128/84      There is no height or weight on file to calculate BMI.      Physical Exam   Constitutional: He is oriented to person, place, and time. He appears well-developed and well-nourished. He is cooperative. No distress.   HENT:   Head: Atraumatic.   Mouth/Throat: Mucous membranes are normal.   Neck: Trachea normal. Normal carotid pulses present. Carotid bruit is not present.   Abdominal: There is no hepatosplenomegaly.   Neurological: He is alert and oriented to person, place, and time. Gait normal.   Skin: He is not diaphoretic. No cyanosis. Nails show no clubbing.   Psychiatric: He has a normal mood and affect. His behavior is normal.        Accessory Clinical Findings:   Echocardiogram:  Results for orders placed or performed during the hospital encounter of 07/28/15   ECHOCARDIOGRAM COMP W/O CONT   Result Value Ref Range    Eject.Frac. MOD BP 59.37     Eject.Frac. MOD 4C 62.42     Eject.Frac. MOD 2C 60.22       Lab Results   Component Value Date    CHOLSTRLTOT 103 01/09/2021    LDL 22 01/09/2021    HDL 28 (A)  01/09/2021    TRIGLYCERIDE 267 (H) 01/09/2021           Lab Results   Component Value Date    HBA1C 5.6 01/09/2021      Lab Results   Component Value Date    SODIUM 133 (L) 01/09/2021    POTASSIUM 4.1 01/09/2021    CHLORIDE 100 01/09/2021    CO2 22 01/09/2021    GLUCOSE 87 01/09/2021    BUN 20 01/09/2021    CREATININE 1.22 01/09/2021         Lab Results   Component Value Date    URCREAT 42.40 03/26/2019    MICROALBUR <0.7 03/26/2019    MALBCRT see below 03/26/2019        Multiple imaging studies and lab reports were available in EMR and reviewed at today's visit    TIMBO Chowdhury.     Cc:   TATYANA Naidu

## 2021-05-15 ENCOUNTER — OFFICE VISIT (OUTPATIENT)
Dept: URGENT CARE | Facility: CLINIC | Age: 31
End: 2021-05-15
Payer: COMMERCIAL

## 2021-05-15 VITALS
OXYGEN SATURATION: 99 % | SYSTOLIC BLOOD PRESSURE: 126 MMHG | HEIGHT: 65 IN | BODY MASS INDEX: 39.99 KG/M2 | TEMPERATURE: 97.2 F | DIASTOLIC BLOOD PRESSURE: 94 MMHG | HEART RATE: 88 BPM | WEIGHT: 240 LBS | RESPIRATION RATE: 13 BRPM

## 2021-05-15 DIAGNOSIS — M10.9 ACUTE GOUT OF LEFT FOOT, UNSPECIFIED CAUSE: ICD-10-CM

## 2021-05-15 DIAGNOSIS — I10 ESSENTIAL HYPERTENSION: ICD-10-CM

## 2021-05-15 PROCEDURE — 99213 OFFICE O/P EST LOW 20 MIN: CPT | Performed by: NURSE PRACTITIONER

## 2021-05-15 RX ORDER — METHYLPREDNISOLONE 4 MG/1
4 TABLET ORAL DAILY
Qty: 21 EACH | Refills: 0 | Status: SHIPPED | OUTPATIENT
Start: 2021-05-15 | End: 2021-06-02

## 2021-05-15 NOTE — PROGRESS NOTES
Chief Complaint   Patient presents with   • Toe Pain     x 2-3 days, pain on lt big toe.  Had gout before       HISTORY OF PRESENT ILLNESS: Patient is a pleasant 30 y.o. male who presents to urgent care today with complaints of left foot pain.  Onset of pain was approximately 2 to 3 days ago, has gradually worsened since.  He has a history of gout, this feels exactly similar.  His gout typically flares up when he eats red meat regularly which he has recently been doing.  He has been prescribed indomethacin in the past, but has run out of this medication.  He otherwise feels well denies any fever, chills, malaise.  Denies any recent injury or trauma.  His symptoms today feel exactly like his previous gout flares.    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    Current Outpatient Medications Ordered in Epic   Medication Sig Dispense Refill   • methylPREDNISolone (MEDROL DOSEPAK) 4 MG Tablet Therapy Pack Take 1 tablet by mouth every day. Take with food. 21 Each 0   • atorvastatin (LIPITOR) 10 MG Tab Take 1/2 Tablet by mouth every day. 45 tablet 3   • metFORMIN (GLUCOPHAGE) 850 MG Tab Take 1 Tab by mouth 2 times a day, with meals. 180 Tab 3   • spironolactone (ALDACTONE) 25 MG Tab Take 1 Tab by mouth every day. 90 Tab 3   • carvedilol (COREG) 25 MG Tab Take 1 Tab by mouth 2 times a day, with meals. 180 Tab 3   • amLODIPine (NORVASC)  10 MG Tab Take 1 Tab by mouth every day. 90 Tab 3     No current Epic-ordered facility-administered medications on file.       Past Medical History:   Diagnosis Date   • Cold     , had a cold for a week, denies SOB, productive cough   • Diabetes (HCC)     diet, oral meds   • Dyslipidemia 2015   • Fatty liver    • Gout    • High cholesterol    • Hypertension    • Liver mass    • Obese    • Sleep apnea     Uses cpap   • Snoring        Social History     Tobacco Use   • Smoking status: Never Smoker   • Smokeless tobacco: Never Used   Vaping Use   • Vaping Use: Never used   Substance Use Topics   • Alcohol use: Yes     Alcohol/week: 0.0 oz     Comment: 1 per week   • Drug use: No       Family Status   Relation Name Status   • Mo  Alive   • Fa     • Sis  Alive   • Sis  Alive   • Sis  Alive   • Sis  Alive     Family History   Problem Relation Age of Onset   • Hypertension Mother    • Heart Disease Father    • Hypertension Father    • Hypertension Sister    • Heart Disease Sister        ROS:  Review of Systems   Constitutional: Negative for fever, chills, weight loss, malaise, and fatigue.   HENT: Negative for ear pain, nosebleeds, congestion, sore throat and neck pain.    Eyes: Negative for vision changes.   Neuro: Negative for headache, sensory changes, weakness, seizure, LOC.   Cardiovascular: Negative for chest pain, palpitations, orthopnea and leg swelling.   Respiratory: Negative for cough, sputum production, shortness of breath and wheezing.   Gastrointestinal: Negative for abdominal pain, nausea, vomiting or diarrhea.   Genitourinary: Negative for dysuria, urgency and frequency.  Musculoskeletal: Positive for left foot pain.  Negative for falls, neck pain, back pain, joint pain, myalgias.   Skin: Negative for rash, diaphoresis.     Exam:  /94 (BP Location: Left arm, Patient Position: Sitting, BP Cuff Size: Large adult)   Pulse 88   Temp 36.2 °C (97.2 °F) (Temporal)   Resp 13   Ht  "1.651 m (5' 5\")   Wt 109 kg (240 lb)   SpO2 99%   General: well-nourished, well-developed male in NAD  Head: normocephalic, atraumatic  Eyes: PERRLA, no conjunctival injection, acuity grossly intact, lids normal.  Ears: normal shape and symmetry, no tenderness, no discharge. External canals are without any significant edema or erythema. Tympanic membranes are without any inflammation, no effusion. Gross auditory acuity is intact.  Nose: symmetrical without tenderness, no discharge.  Mouth/Throat: reasonable hygiene, no erythema, exudates or tonsillar enlargement.  Neck: no masses, range of motion within normal limits, no tracheal deviation. No obvious thyroid enlargement.   Lymph: no cervical adenopathy. No supraclavicular adenopathy.   Neuro: alert and oriented. Cranial nerves 1-12 grossly intact. No sensory deficit.   Cardiovascular: regular rate and rhythm. No edema.  Pulmonary: no distress. Chest is symmetrical with respiration, no wheezes, crackles, or rhonchi.   Musculoskeletal: no clubbing, appropriate muscle tone, gait is stable.  Left foot: Normal in appearance, no erythema or swelling, tenderness to distal arch and tophi region.  Neurovascular is intact, cap refill is brisk.  Skin is intact.  Skin: warm, dry, intact, no clubbing, no cyanosis, no rashes.   Psych: appropriate mood, affect, judgement.         Assessment/Plan:  1. Acute gout of left foot, unspecified cause  methylPREDNISolone (MEDROL DOSEPAK) 4 MG Tablet Therapy Pack       Patient is a pleasant 30-year-old male who presents to the urgent care today with atraumatic left foot pain.  He notes his symptoms are exactly similar to his previous episodes of gout.  Diet considerations discussed.  Medrol provided, Tylenol as needed for pain.  He is instructed to follow-up with his primary care provider for further gout management.  Supportive care, differential diagnoses, and indications for immediate follow-up discussed with patient.   Pathogenesis " of diagnosis discussed including typical length and natural progression.   Instructed to return to clinic or nearest emergency department for any change in condition, further concerns, or worsening of symptoms.  Patient states understanding of the plan of care and discharge instructions.          Please note that this dictation was created using voice recognition software. I have made every reasonable attempt to correct obvious errors, but I expect that there are errors of grammar and possibly content that I did not discover before finalizing the note.      SUKHJINDER Garcia.

## 2021-05-17 RX ORDER — CARVEDILOL 25 MG/1
25 TABLET ORAL 2 TIMES DAILY WITH MEALS
Qty: 180 TABLET | Refills: 3 | Status: SHIPPED | OUTPATIENT
Start: 2021-05-17 | End: 2021-06-02 | Stop reason: SDUPTHER

## 2021-05-29 ENCOUNTER — HOSPITAL ENCOUNTER (OUTPATIENT)
Dept: LAB | Facility: MEDICAL CENTER | Age: 31
End: 2021-05-29
Attending: NURSE PRACTITIONER
Payer: COMMERCIAL

## 2021-05-29 DIAGNOSIS — I10 ESSENTIAL HYPERTENSION: ICD-10-CM

## 2021-05-29 DIAGNOSIS — E78.1 HYPERTRIGLYCERIDEMIA: ICD-10-CM

## 2021-05-29 DIAGNOSIS — R73.01 ELEVATED FASTING GLUCOSE: ICD-10-CM

## 2021-05-29 DIAGNOSIS — E87.6 HYPOKALEMIA: ICD-10-CM

## 2021-05-29 LAB
ALBUMIN SERPL BCP-MCNC: 4.2 G/DL (ref 3.2–4.9)
ALBUMIN/GLOB SERPL: 1.3 G/DL
ALP SERPL-CCNC: 59 U/L (ref 30–99)
ALT SERPL-CCNC: 30 U/L (ref 2–50)
ANION GAP SERPL CALC-SCNC: 12 MMOL/L (ref 7–16)
AST SERPL-CCNC: 20 U/L (ref 12–45)
BILIRUB SERPL-MCNC: 0.6 MG/DL (ref 0.1–1.5)
BUN SERPL-MCNC: 15 MG/DL (ref 8–22)
CALCIUM SERPL-MCNC: 9.5 MG/DL (ref 8.5–10.5)
CHLORIDE SERPL-SCNC: 103 MMOL/L (ref 96–112)
CHOLEST SERPL-MCNC: 110 MG/DL (ref 100–199)
CO2 SERPL-SCNC: 23 MMOL/L (ref 20–33)
CREAT SERPL-MCNC: 1.1 MG/DL (ref 0.5–1.4)
CREAT UR-MCNC: 27.11 MG/DL
GLOBULIN SER CALC-MCNC: 3.3 G/DL (ref 1.9–3.5)
GLUCOSE SERPL-MCNC: 105 MG/DL (ref 65–99)
HDLC SERPL-MCNC: 32 MG/DL
LDLC SERPL CALC-MCNC: 51 MG/DL
MICROALBUMIN UR-MCNC: <1.2 MG/DL
MICROALBUMIN/CREAT UR: NORMAL MG/G (ref 0–30)
POTASSIUM SERPL-SCNC: 4.3 MMOL/L (ref 3.6–5.5)
PROT SERPL-MCNC: 7.5 G/DL (ref 6–8.2)
SODIUM SERPL-SCNC: 138 MMOL/L (ref 135–145)
TRIGL SERPL-MCNC: 137 MG/DL (ref 0–149)

## 2021-05-29 PROCEDURE — 80053 COMPREHEN METABOLIC PANEL: CPT

## 2021-05-29 PROCEDURE — 36415 COLL VENOUS BLD VENIPUNCTURE: CPT

## 2021-05-29 PROCEDURE — 82043 UR ALBUMIN QUANTITATIVE: CPT

## 2021-05-29 PROCEDURE — 82570 ASSAY OF URINE CREATININE: CPT

## 2021-05-29 PROCEDURE — 80061 LIPID PANEL: CPT

## 2021-06-02 ENCOUNTER — OFFICE VISIT (OUTPATIENT)
Dept: VASCULAR LAB | Facility: MEDICAL CENTER | Age: 31
End: 2021-06-02
Attending: INTERNAL MEDICINE
Payer: COMMERCIAL

## 2021-06-02 VITALS
BODY MASS INDEX: 40.82 KG/M2 | SYSTOLIC BLOOD PRESSURE: 136 MMHG | DIASTOLIC BLOOD PRESSURE: 82 MMHG | HEIGHT: 65 IN | HEART RATE: 76 BPM | WEIGHT: 245 LBS

## 2021-06-02 DIAGNOSIS — E78.5 DYSLIPIDEMIA: ICD-10-CM

## 2021-06-02 DIAGNOSIS — I10 ESSENTIAL HYPERTENSION: ICD-10-CM

## 2021-06-02 DIAGNOSIS — E11.9 TYPE 2 DIABETES MELLITUS WITHOUT COMPLICATION, WITHOUT LONG-TERM CURRENT USE OF INSULIN (HCC): ICD-10-CM

## 2021-06-02 DIAGNOSIS — R73.03 PREDIABETES: ICD-10-CM

## 2021-06-02 DIAGNOSIS — E78.1 HYPERTRIGLYCERIDEMIA: ICD-10-CM

## 2021-06-02 PROCEDURE — 99212 OFFICE O/P EST SF 10 MIN: CPT | Performed by: NURSE PRACTITIONER

## 2021-06-02 PROCEDURE — 99214 OFFICE O/P EST MOD 30 MIN: CPT | Performed by: NURSE PRACTITIONER

## 2021-06-02 RX ORDER — AMLODIPINE BESYLATE 10 MG/1
10 TABLET ORAL DAILY
Qty: 90 TABLET | Refills: 3 | Status: SHIPPED | OUTPATIENT
Start: 2021-06-02 | End: 2021-10-31

## 2021-06-02 RX ORDER — SPIRONOLACTONE 25 MG/1
25 TABLET ORAL DAILY
Qty: 90 TABLET | Refills: 3 | Status: SHIPPED | OUTPATIENT
Start: 2021-06-02 | End: 2022-06-16

## 2021-06-02 RX ORDER — CARVEDILOL 25 MG/1
25 TABLET ORAL 2 TIMES DAILY WITH MEALS
Qty: 180 TABLET | Refills: 3 | Status: SHIPPED | OUTPATIENT
Start: 2021-06-02 | End: 2021-10-19 | Stop reason: SDUPTHER

## 2021-06-02 RX ORDER — ATORVASTATIN CALCIUM 10 MG/1
5 TABLET, FILM COATED ORAL DAILY
Qty: 45 TABLET | Refills: 3 | Status: SHIPPED | OUTPATIENT
Start: 2021-06-02 | End: 2021-09-26 | Stop reason: SDUPTHER

## 2021-06-02 ASSESSMENT — ENCOUNTER SYMPTOMS
CHILLS: 0
COUGH: 0
MYALGIAS: 0
PALPITATIONS: 0
WEIGHT LOSS: 0
BLOOD IN STOOL: 0
SHORTNESS OF BREATH: 1
DOUBLE VISION: 0
BLURRED VISION: 0
DIZZINESS: 0
ABDOMINAL PAIN: 0
DIARRHEA: 0
HEADACHES: 0
NERVOUS/ANXIOUS: 0
VOMITING: 0
WHEEZING: 0
WEAKNESS: 0
BRUISES/BLEEDS EASILY: 0
NAUSEA: 0
FEVER: 0

## 2021-06-02 NOTE — PROGRESS NOTES
Resistant Hypertension Follow Up Visit  6/2/2021    Assessment / Plan:   1. Blood Pressure Control:  ACC/AHA (2017) goal <130/80  Home BP at goal:  Unclear  Office BP at goal: Slightly elevated today  Reports he does not have a large cuff at home so his readings are much higher than in office today  Recommended he purchase a large cuff- given sizing instructions  Plan:   Slightly elevated in-office readings last 2 visits  - continue home BP monitoring, reviewed correct technique:  Yes   - Under reasonable control previously on ABPM; could consider rpt in future  - Encouraged home BP monitoring with upper-arm large cuff- call if readings  >130/80 with new cuff    2. Work up of Secondary Causes of Resistant Hypertension:   Renovascular HTN: Renal artery duplex with no evidence or MEGAN (2015), but may not be best test for potential FMD  Primary Aldosteronism: Excluded ARR normal, No adrenal adenoma noted on previous imaging  Thyroid Function: Excluded - TSH WNL 9/9/17  Obstructive Sleep Apnea: Postive - back on CPAP, doing well  Pheochromocytoma: Excluded 24 hour urine normal July 2014  Instrinsic renal disease - normal GFR and kidneys unremarkable appearing on sono  Coarctation - not seen on previous imaging/angiogram  Recommendations At This Visit:   - consider MRA renal arteries in future if BP becomes more difficult to control    3. Medication Use / Adherence:  Assessment: Completely compliant  Recommendations: Instructed to Continue Taking All Medications As Prescribed         4. End Organ Damage:   Left Ventricular Hypertrophy: Present on  Echocardiogram Date: 2015  Albuminuria: improved 9/9/17: 36  Renal Function: Chronic Kidney Disease  Stage 2 at worst (GFR >60)  Established Cardiovascular Disease: None    5. Lifestyle Recommendations:    Smoking: continued complete avoidance of all tobacco products     Physical Activity: encouraged more healthy activity to improve CV fitness; increase exercise and weight  loss-- work has been the biggest barrier to going to gym as he is currently working 6 days per week. Encouraged him that with adequate weight loss may be able to decrease many of his medications.  He is motivated and agrees to at least a 15 lb weight loss by next visit.      Weight Management and Nutrition: Dietary plan was discussed with patient at this visit including DASH, low sodium diet    6. Standard HTN Pharmacotherapy:  ACEI/ARB:  Consider adding ACE or ARB in future if BP not controlled  Thiazide Type Diuretic: Avoid given gout and hypokalemia  Calcium Channel Blocker (CCB): continue amlodipine 10 mg daily    7. Additional Agents:  Beta Blocker - continue carvedilol 25mg BID   Mineralocorticoid Receptor Antagonist (MRA) - continue spironolactone 25 mg daily   Peripheral alpha-blockers:  Not indicated at this time   Central alpha-agonists:  Not indicated   Direct vasodilators:  Not indicated   Other:  None      8. Other CV Risk Factors:   Lipids - ASCVD risk >10%  LDLP # and small LDLP well control  TC and LDL still low  TG normal; elevated at baseline  5/29/2021: NonHDL-78, LDL-51  Plan:  - Continue atorvastatin 5mg for now  - Continue vit D 4000 units daily   - Recheck lipids in 6 months    Prediabetes, pmhx of borderline T2D (highest a1c = 6.6)  Last A1c = 5.5  1/2020  Plan:  - encouraged more healthy diet, weight reduction, daily physical activity   - continue metformin 850mg BID to slow or offset progression to T2D as per DPP trial   - A1C with next labs    9. Other Issues:  Gout - no recent recurrence. avoid thiazide and loop diuretics,  low purine diet,  - otherwise defer management to PCP    GALILEA - continue CPAP and f/u with pulm- encouraged annual follow up       History of ruptured hepatic adenoma in 11/12, and 5/13-treated surgically by Dr. Ding.  LFTs stable   - defer all further w/u and management to GI and PCP    Studies Ordered At This Visit:   None   Blood Work to Be Obtained Prior to  Next Visit:  CMP, lipids, MA  Follow Up:  3mo    Diagnosis:  1. Essential hypertension     2. Dyslipidemia     3. Hypertriglyceridemia        History of Present Illness:   Medina Gallo is a male seen for f/u of hypertension, dyslidipidemia, [id number], gout and GALILEA    HTN:  Current HTN concerns:  None at present, although concerned with higher in-office readings  ADRs: No  HTN sx:  No current blurred or changed vision, chest pain, shortness of breath, headache, nausea, dizziness/vertigo   Home BP log:  Elevated but does not have large cuff, no likely inaccurate   24h ABPM completed: not tested  Adherence to current HTN meds: compliant all of the time     Anticoagulation/antiplats:   No    Hyperlipidemia:    Stable, no current concerns  Current treatment: atorva 5mg   Myalgias? No  Other adverse drug reactions? No  Lipid profile: done    GALILEA:   Much better compliance with CPAP at this time.   No recent visits with sleep MD    PreDM:   Tolerating meds, mild weight gain.  No recent A1C    Gout:   Stable. No gout flares    Social History     Tobacco Use   • Smoking status: Never Smoker   • Smokeless tobacco: Never Used   Vaping Use   • Vaping Use: Never used   Substance Use Topics   • Alcohol use: Yes     Alcohol/week: 0.0 oz     Comment: 1 per week   • Drug use: No     SOCIAL HISTORY  Diet - far less consistent.  Will try low carb, low sugar  Weight - up 10lbs over the last 6mo  BMI Readings from Last 4 Encounters:   06/02/21 40.77 kg/m²   05/15/21 39.94 kg/m²   11/03/20 40.77 kg/m²   10/23/20 41.10 kg/m²      No smoking      Review of Systems:   Review of Systems   Constitutional: Negative for chills, fever, malaise/fatigue and weight loss.   Eyes: Negative for blurred vision and double vision.   Respiratory: Positive for shortness of breath (covid related). Negative for cough and wheezing.    Cardiovascular: Negative for chest pain, palpitations and leg swelling.   Gastrointestinal: Negative for  "abdominal pain, blood in stool, diarrhea, nausea and vomiting.   Genitourinary: Negative for hematuria.   Musculoskeletal: Negative for joint pain and myalgias.   Skin: Negative for rash.   Neurological: Negative for dizziness, weakness and headaches.   Endo/Heme/Allergies: Does not bruise/bleed easily.   Psychiatric/Behavioral: The patient is not nervous/anxious.       Objective:   Allergies:  Patient has no known allergies.    Vitals:    06/02/21 0819   BP: 136/82   BP Location: Left arm   Patient Position: Sitting   BP Cuff Size: Large adult   Pulse: 76   Weight: 111 kg (245 lb)   Height: 1.651 m (5' 5\")     BP Readings from Last 4 Encounters:   06/02/21 136/82   05/15/21 126/94   11/03/20 120/82   10/23/20 136/83      Body mass index is 40.77 kg/m².      Physical Exam  Constitutional:       General: He is not in acute distress.     Appearance: He is well-developed. He is not diaphoretic.   HENT:      Head: Atraumatic.   Neck:      Vascular: Normal carotid pulses. No carotid bruit.      Trachea: Trachea normal.   Skin:     Nails: There is no clubbing.   Neurological:      Mental Status: He is alert and oriented to person, place, and time.      Gait: Gait normal.   Psychiatric:         Behavior: Behavior normal. Behavior is cooperative.        Accessory Clinical Findings:   Echocardiogram:  Results for orders placed or performed during the hospital encounter of 07/28/15   ECHOCARDIOGRAM COMP W/O CONT   Result Value Ref Range    Eject.Frac. MOD BP 59.37     Eject.Frac. MOD 4C 62.42     Eject.Frac. MOD 2C 60.22       Lab Results   Component Value Date    CHOLSTRLTOT 110 05/29/2021    LDL 51 05/29/2021    HDL 32 (A) 05/29/2021    TRIGLYCERIDE 137 05/29/2021      Lab Results   Component Value Date    HBA1C 5.6 01/09/2021      Lab Results   Component Value Date    SODIUM 138 05/29/2021    POTASSIUM 4.3 05/29/2021    CHLORIDE 103 05/29/2021    CO2 23 05/29/2021    GLUCOSE 105 (H) 05/29/2021    BUN 15 05/29/2021    " CREATININE 1.10 05/29/2021     Lab Results   Component Value Date    URCREAT 27.11 05/29/2021    MICROALBUR <1.2 05/29/2021    MALBCRT see below 05/29/2021        Multiple imaging studies and lab reports were available in EMR and reviewed at today's visit    MERCEDES Wu     Cc:   TATYANA Naidu

## 2021-06-05 ENCOUNTER — OFFICE VISIT (OUTPATIENT)
Dept: URGENT CARE | Facility: PHYSICIAN GROUP | Age: 31
End: 2021-06-05
Payer: COMMERCIAL

## 2021-06-05 VITALS
TEMPERATURE: 96.6 F | HEART RATE: 71 BPM | BODY MASS INDEX: 39.49 KG/M2 | WEIGHT: 237 LBS | RESPIRATION RATE: 17 BRPM | DIASTOLIC BLOOD PRESSURE: 80 MMHG | OXYGEN SATURATION: 92 % | HEIGHT: 65 IN | SYSTOLIC BLOOD PRESSURE: 120 MMHG

## 2021-06-05 DIAGNOSIS — M10.071 ACUTE IDIOPATHIC GOUT OF RIGHT FOOT: ICD-10-CM

## 2021-06-05 PROCEDURE — 99214 OFFICE O/P EST MOD 30 MIN: CPT | Performed by: PHYSICIAN ASSISTANT

## 2021-06-05 RX ORDER — INDOMETHACIN 50 MG/1
50 CAPSULE ORAL 3 TIMES DAILY
Qty: 90 CAPSULE | Refills: 0 | Status: SHIPPED | OUTPATIENT
Start: 2021-06-05 | End: 2022-01-06

## 2021-06-05 ASSESSMENT — ENCOUNTER SYMPTOMS
FEVER: 0
FOCAL WEAKNESS: 0
ARTHRALGIAS: 1
SENSORY CHANGE: 0
TINGLING: 0

## 2021-06-05 NOTE — PATIENT INSTRUCTIONS
Low-Purine Eating Plan  A low-purine eating plan involves making food choices to limit your intake of purine. Purine is a kind of uric acid. Too much uric acid in your blood can cause certain conditions, such as gout and kidney stones. Eating a low-purine diet can help control these conditions.  What are tips for following this plan?  Reading food labels    · Avoid foods with saturated or Trans fat.  · Check the ingredient list of grains-based foods, such as bread and cereal, to make sure that they contain whole grains.  · Check the ingredient list of sauces or soups to make sure they do not contain meat or fish.  · When choosing soft drinks, check the ingredient list to make sure they do not contain high-fructose corn syrup.  Shopping  · Buy plenty of fresh fruits and vegetables.  · Avoid buying canned or fresh fish.  · Buy dairy products labeled as low-fat or nonfat.  · Avoid buying premade or processed foods. These foods are often high in fat, salt (sodium), and added sugar.  Cooking  · Use olive oil instead of butter when cooking. Oils like olive oil, canola oil, and sunflower oil contain healthy fats.  Meal planning  · Learn which foods do or do not affect you. If you find out that a food tends to cause your gout symptoms to flare up, avoid eating that food. You can enjoy foods that do not cause problems. If you have any questions about a food item, talk with your dietitian or health care provider.  · Limit foods high in fat, especially saturated fat. Fat makes it harder for your body to get rid of uric acid.  · Choose foods that are lower in fat and are lean sources of protein.  General guidelines  · Limit alcohol intake to no more than 1 drink a day for nonpregnant women and 2 drinks a day for men. One drink equals 12 oz of beer, 5 oz of wine, or 1½ oz of hard liquor. Alcohol can affect the way your body gets rid of uric acid.  · Drink plenty of water to keep your urine clear or pale yellow. Fluids can help  remove uric acid from your body.  · If directed by your health care provider, take a vitamin C supplement.  · Work with your health care provider and dietitian to develop a plan to achieve or maintain a healthy weight. Losing weight can help reduce uric acid in your blood.  What foods are recommended?  The items listed may not be a complete list. Talk with your dietitian about what dietary choices are best for you.  Foods low in purines  Foods low in purines do not need to be limited. These include:  · All fruits.  · All low-purine vegetables, pickles, and olives.  · Breads, pasta, rice, cornbread, and popcorn. Cake and other baked goods.  · All dairy foods.  · Eggs, nuts, and nut butters.  · Spices and condiments, such as salt, herbs, and vinegar.  · Plant oils, butter, and margarine.  · Water, sugar-free soft drinks, tea, coffee, and cocoa.  · Vegetable-based soups, broths, sauces, and gravies.  Foods moderate in purines  Foods moderate in purines should be limited to the amounts listed.  · ½ cup of asparagus, cauliflower, spinach, mushrooms, or green peas, each day.  · 2/3 cup uncooked oatmeal, each day.  · ¼ cup dry wheat bran or wheat germ, each day.  · 2-3 ounces of meat or poultry, each day.  · 4-6 ounces of shellfish, such as crab, lobster, oysters, or shrimp, each day.  · 1 cup cooked beans, peas, or lentils, each day.  · Soup, broths, or bouillon made from meat or fish. Limit these foods as much as possible.  What foods are not recommended?  The items listed may not be a complete list. Talk with your dietitian about what dietary choices are best for you.  Limit your intake of foods high in purines, including:  · Beer and other alcohol.  · Meat-based gravy or sauce.  · Canned or fresh fish, such as:  ? Anchovies, sardines, herring, and tuna.  ? Mussels and scallops.  ? Codfish, trout, and michel.  · Wade.  · Organ meats, such as:  ? Liver or kidney.  ? Tripe.  ? Sweetbreads (thymus gland or  pancreas).  · Wild game or goose.  · Yeast or yeast extract supplements.  · Drinks sweetened with high-fructose corn syrup.  Summary  · Eating a low-purine diet can help control conditions caused by too much uric acid in the body, such as gout or kidney stones.  · Choose low-purine foods, limit alcohol, and limit foods high in fat.  · You will learn over time which foods do or do not affect you. If you find out that a food tends to cause your gout symptoms to flare up, avoid eating that food.  This information is not intended to replace advice given to you by your health care provider. Make sure you discuss any questions you have with your health care provider.  Document Released: 04/13/2012 Document Revised: 11/30/2018 Document Reviewed: 01/31/2018  Elsevier Patient Education © 2020 [employer]

## 2021-06-05 NOTE — PROGRESS NOTES
Subjective:      Medina Gallo is a 30 y.o. male who presents with Gout (R foot)    Medications:    • amLODIPine Tabs  • atorvastatin Tabs  • carvedilol Tabs  • indomethacin Caps  • metFORMIN Tabs  • spironolactone Tabs    Allergies: Patient has no known allergies.    Problem List: Medina Gallo does not have any pertinent problems on file.    Surgical History:  Past Surgical History:   Procedure Laterality Date   • GASTROSCOPY  8/24/2018    Procedure: GASTROSCOPY - POSS BIOPSY, DILATION, POLYPECTOMY, CONTROL OF HEMORRHAGE;  Surgeon: Munir Thomas M.D.;  Location: [facility];  Service: Gastroenterology   • EGD W/ENDOSCOPIC ULTRASOUND  8/24/2018    Procedure: EGD W/ENDOSCOPIC ULTRASOUND;  Surgeon: Munir Thomas M.D.;  Location: [facility];  Service: Gastroenterology   • EGD WITH ASP/BX  8/24/2018    Procedure: EGD WITH ASP/BX - FNA;  Surgeon: Munir Thomas M.D.;  Location: [facility];  Service: Gastroenterology   • HEPATIC ABLATION LAPAROSCOPIC  5/9/2013    Performed by Shai Ding M.D. at SURGERY [facility]   • HEPATIC ABLATION LAPAROSCOPIC  11/19/2012    Performed by Shai Ding M.D. at SURGERY [facility] ORS   • RECOVERY  11/15/2012    Performed by [facility] at SURGERY SAME DAY [facility] ORS   • LIVER BIOPSY  11/9/12 and 11/15/12       Past Social Hx: Medina Gallo  reports that he has never smoked. He has never used smokeless tobacco. He reports current alcohol use. He reports that he does not use drugs.     Past Family Hx:  Medina Gallo family history includes Heart Disease in his father and sister; Hypertension in his father, mother, and sister.     Problem list, medications, and allergies reviewed by myself today in Epic.          PT with history of occasional gout presents to clinic with co of gout flare up of right foot. Pt states pain started at base of great toe  "on dorsum of foot, has progressed to midfoot and toward medial aspect of foot. Pt denies injury to foot.  Pt states he has been prescribed indomethacin in the past with good relief, but is out.    Foot Problem  This is a new problem. The current episode started in the past 7 days. The problem occurs constantly. The problem has been gradually worsening. Associated symptoms include arthralgias. Pertinent negatives include no fever or rash. The symptoms are aggravated by standing, walking and exertion. He has tried position changes and rest for the symptoms. The treatment provided no relief.       Review of Systems   Constitutional: Negative for fever.   Musculoskeletal: Positive for arthralgias and joint pain.   Skin: Negative for rash.   Neurological: Negative for tingling, sensory change and focal weakness.   All other systems reviewed and are negative.         Objective:     /80 (BP Location: Left arm, Patient Position: Sitting, BP Cuff Size: Large adult)   Pulse 71   Temp 35.9 °C (96.6 °F) (Temporal)   Resp 17   Ht 1.651 m (5' 5\")   Wt 108 kg (237 lb)   SpO2 92%   BMI 39.44 kg/m²      Physical Exam  Vitals and nursing note reviewed.   Constitutional:       General: He is not in acute distress.     Appearance: Normal appearance. He is well-developed. He is obese. He is not ill-appearing.   HENT:      Head: Normocephalic and atraumatic.      Nose: Nose normal.      Mouth/Throat:      Mouth: Mucous membranes are moist.   Eyes:      Extraocular Movements: Extraocular movements intact.      Conjunctiva/sclera: Conjunctivae normal.      Pupils: Pupils are equal, round, and reactive to light.   Cardiovascular:      Rate and Rhythm: Normal rate and regular rhythm.      Pulses: Normal pulses.      Heart sounds: Normal heart sounds.   Pulmonary:      Effort: Pulmonary effort is normal.      Breath sounds: Normal breath sounds.   Abdominal:      Palpations: Abdomen is soft.   Musculoskeletal:      Cervical back: " Normal range of motion and neck supple.      Right foot: Normal range of motion and normal capillary refill. Swelling and tenderness present. No deformity, bunion or crepitus. Normal pulse.      Left foot: Normal.        Feet:    Skin:     General: Skin is warm and dry.      Capillary Refill: Capillary refill takes less than 2 seconds.   Neurological:      General: No focal deficit present.      Mental Status: He is alert and oriented to person, place, and time.      Motor: No abnormal muscle tone.   Psychiatric:         Mood and Affect: Mood normal.                        Assessment/Plan:       1. Acute idiopathic gout of right foot  indomethacin (INDOCIN) 50 MG Cap     Pt with history of gout, presents today with a flareup in his right foot.  Patient reports this is typical onset for him.  On review of his chart he has had a recent episode of gout where he was prescribed prednisone rather than indomethacin which he states was not nearly as effective.  With this in mind, I will prescribe the patient indomethacin as this works better for him.    Patient was instructed to avoid food and drinks that will trigger his gout.  Patient admitted that prior to his onset of gout he had some friends visiting from out of town so he was not as diligent about his diet.     PT should follow up with PCP in 1-2 days for re-evaluation if symptoms have not improved.      Discussed red flags and reasons to return to UC or ED.      Pt and/or family verbalized understanding of diagnosis and follow up instructions and was offered informational handout on diagnosis.  PT discharged.

## 2021-06-08 ENCOUNTER — OFFICE VISIT (OUTPATIENT)
Dept: MEDICAL GROUP | Facility: MEDICAL CENTER | Age: 31
End: 2021-06-08
Payer: COMMERCIAL

## 2021-06-08 VITALS
HEART RATE: 73 BPM | HEIGHT: 65 IN | SYSTOLIC BLOOD PRESSURE: 110 MMHG | DIASTOLIC BLOOD PRESSURE: 70 MMHG | BODY MASS INDEX: 40.92 KG/M2 | WEIGHT: 245.59 LBS | TEMPERATURE: 97.6 F | OXYGEN SATURATION: 96 % | RESPIRATION RATE: 20 BRPM

## 2021-06-08 DIAGNOSIS — E78.5 DYSLIPIDEMIA: ICD-10-CM

## 2021-06-08 DIAGNOSIS — Z23 NEED FOR VACCINATION: ICD-10-CM

## 2021-06-08 DIAGNOSIS — R73.01 ELEVATED FASTING GLUCOSE: ICD-10-CM

## 2021-06-08 DIAGNOSIS — I10 ESSENTIAL HYPERTENSION: ICD-10-CM

## 2021-06-08 DIAGNOSIS — Z00.00 ANNUAL PHYSICAL EXAM: ICD-10-CM

## 2021-06-08 DIAGNOSIS — G47.33 OSA (OBSTRUCTIVE SLEEP APNEA): ICD-10-CM

## 2021-06-08 PROCEDURE — 90471 IMMUNIZATION ADMIN: CPT | Performed by: NURSE PRACTITIONER

## 2021-06-08 PROCEDURE — 90670 PCV13 VACCINE IM: CPT | Performed by: NURSE PRACTITIONER

## 2021-06-08 PROCEDURE — 99395 PREV VISIT EST AGE 18-39: CPT | Mod: 25 | Performed by: NURSE PRACTITIONER

## 2021-06-08 ASSESSMENT — PATIENT HEALTH QUESTIONNAIRE - PHQ9
CLINICAL INTERPRETATION OF PHQ2 SCORE: 3
5. POOR APPETITE OR OVEREATING: 3 - NEARLY EVERY DAY
SUM OF ALL RESPONSES TO PHQ QUESTIONS 1-9: 8

## 2021-06-08 NOTE — PROGRESS NOTES
Subjective:     Chief Complaint   Patient presents with   • Advice Only     BP control, weight management     Medina Gallo is a 30 y.o. male here t for annual  Essential hypertension  Chronic issue stable on carvedilol, sprinonolactone, amlodipine  No chest pain, dizziness, palpitation.  Followed by cardiology    Dyslipidemia  Managing with atorvastatin 10 mg daily, lipid panel remains controlled    GALILEA (obstructive sleep apnea)  Consistent with CPAP use    BMI 40.0-44.9, adult ([facility])  States diet is poor, typically eating once a day, often fast food. Has not been exercising d/t working excessively     Gout  Recent flare slowly improving with indomethacin     Elevated fasting glucose  Doing well on Metformin       Current medicines (including changes today)  Current Outpatient Medications   Medication Sig Dispense Refill   • indomethacin (INDOCIN) 50 MG Cap Take 1 capsule by mouth 3 times a day. 90 capsule 0   • carvedilol (COREG) 25 MG Tab Take 1 tablet by mouth 2 times a day with meals. 180 tablet 3   • atorvastatin (LIPITOR) 10 MG Tab Take 0.5 Tablets by mouth every day. 45 tablet 3   • metFORMIN (GLUCOPHAGE) 850 MG Tab Take 1 tablet by mouth 2 times a day with meals. 180 tablet 3   • spironolactone (ALDACTONE) 25 MG Tab Take 1 tablet by mouth every day. 90 tablet 3   • amLODIPine (NORVASC) 10 MG Tab Take 1 tablet by mouth every day. (Patient taking differently: Take 10 mg by mouth every day. Taking every other day.) 90 tablet 3     No current facility-administered medications for this visit.     He  has a past medical history of Cold, Diabetes ([facility]), Dyslipidemia (12/2/2015), Fatty liver, Gout, High cholesterol, Hypertension (2015), Liver mass, Obese, Sleep apnea, and Snoring. He also has no past medical history of Allergy.    ROS included above     Objective:     /70 (BP Location: Left arm, Patient Position: Sitting, BP Cuff Size: Adult long)   Pulse 73   Temp 36.4 °C (97.6 °F) (Temporal)    "Resp 20   Ht 1.651 m (5' 5\")   Wt 111 kg (245 lb 9.5 oz)   SpO2 96%  Body mass index is 40.87 kg/m².     Physical Exam:  General: Alert, oriented in no acute distress.  Eye contact is good, speech is normal, affect calm  HEENT: TMs gray with good landmarks bilaterally. No lymphadenopathy.  Lungs: clear to auscultation bilaterally, normal effort, no wheeze/ rhonchi/ rales.  CV: regular rate and rhythm, S1, S2, no murmur  Abdomen: soft, nontender, central obesity  Ext: no edema, color normal, vascularity normal, temperature normal    Assessment and Plan:   The following treatment plan was discussed   1. Annual physical exam   normal physical exam except as detailed below.  Health promotion topics reviewed including healthy diet, regular exercise. Routine dental care and vision evaluation discussed   2. Essential hypertension  Stable, followed by cardiology   3. Dyslipidemia  stable   4. GALILEA (obstructive sleep apnea)  Stable, continue cpap   5. BMI 40.0-44.9, adult (HCC)   reports poor diet and lack of exercise.  Dietary recommendations reviewed, advised against skipping meals, incorporate 30 minutes of exercise daily   6. Elevated fasting glucose   continue Metformin   7. Need for vaccination  I have placed the below orders and discussed them with an approved delegating provider. The MA is performing the below orders under the direction of Dr. Wyman  Pneumococcal Conjugate Vaccine 13-Valent       Followup: Annually, sooner as needed. Pt to continue f/u with cardiology         Please note that this dictation was created using voice recognition software. I have worked with consultants from the vendor as well as technical experts from [facility] [employer] to optimize the interface. I have made every reasonable attempt to correct obvious errors, but I expect that there are errors of grammar and possibly content that I did not discover before finalizing the note.       "

## 2021-06-08 NOTE — ASSESSMENT & PLAN NOTE
States diet is poor, typically eating once a day, often fast food. Has not been exercising d/t working excessively

## 2021-08-25 ENCOUNTER — OFFICE VISIT (OUTPATIENT)
Dept: URGENT CARE | Facility: PHYSICIAN GROUP | Age: 31
End: 2021-08-25
Payer: COMMERCIAL

## 2021-08-25 VITALS
BODY MASS INDEX: 40.82 KG/M2 | DIASTOLIC BLOOD PRESSURE: 82 MMHG | WEIGHT: 245 LBS | HEIGHT: 65 IN | HEART RATE: 79 BPM | TEMPERATURE: 97.9 F | SYSTOLIC BLOOD PRESSURE: 144 MMHG | OXYGEN SATURATION: 97 % | RESPIRATION RATE: 16 BRPM

## 2021-08-25 DIAGNOSIS — M10.9 ACUTE GOUT OF RIGHT FOOT, UNSPECIFIED CAUSE: ICD-10-CM

## 2021-08-25 PROCEDURE — 99214 OFFICE O/P EST MOD 30 MIN: CPT | Performed by: PHYSICIAN ASSISTANT

## 2021-08-25 RX ORDER — INDOMETHACIN 50 MG/1
50 CAPSULE ORAL 3 TIMES DAILY
Qty: 21 CAPSULE | Refills: 0 | Status: SHIPPED | OUTPATIENT
Start: 2021-08-25 | End: 2021-09-01

## 2021-08-25 ASSESSMENT — PAIN SCALES - GENERAL: PAINLEVEL: 4=SLIGHT-MODERATE PAIN

## 2021-08-25 NOTE — PROGRESS NOTES
Subjective:   Medina Gallo is a 31 y.o. male who presents for Gout (x1 day. right foot pain, pt states hx of gout )      HPI  Patient is a 31-year-old male who presents the clinic with complaints of a gout flareup.  He reports a history of gout and this feels similar.  Onset of symptoms this morning.  Unknown precipitating or causative factors.  No recent red meat or alcohol.  Pain is primarily located to his right small toe with radiation to his dorsal foot.  There is mild redness to his small toe.  No numbness, tingling, weakness.  Denies any injury or trauma.        Medications:    • amLODIPine Tabs  • atorvastatin Tabs  • carvedilol Tabs  • indomethacin Caps  • metFORMIN Tabs  • spironolactone Tabs    Allergies: Patient has no known allergies.    Problem List: Medina Gallo does not have any pertinent problems on file.    Surgical History:  Past Surgical History:   Procedure Laterality Date   • GASTROSCOPY  8/24/2018    Procedure: GASTROSCOPY - POSS BIOPSY, DILATION, POLYPECTOMY, CONTROL OF HEMORRHAGE;  Surgeon: Munir Thomas M.D.;  Location: [facility];  Service: Gastroenterology   • EGD W/ENDOSCOPIC ULTRASOUND  8/24/2018    Procedure: EGD W/ENDOSCOPIC ULTRASOUND;  Surgeon: Munir Thomas M.D.;  Location: [facility];  Service: Gastroenterology   • EGD WITH ASP/BX  8/24/2018    Procedure: EGD WITH ASP/BX - FNA;  Surgeon: Munir Thomas M.D.;  Location: [facility];  Service: Gastroenterology   • HEPATIC ABLATION LAPAROSCOPIC  5/9/2013    Performed by Shai Ding M.D. at SURGERY [facility]   • HEPATIC ABLATION LAPAROSCOPIC  11/19/2012    Performed by Shai Ding M.D. at SURGERY [facility]   • RECOVERY  11/15/2012    Performed by -[facility] at [facility] SAME DAY [facility]   • LIVER BIOPSY  11/9/12 and 11/15/12       Past Social Hx: Medina Gallo  reports that he has never smoked.  "He has never used smokeless tobacco. He reports previous alcohol use. He reports that he does not use drugs.     Past Family Hx:  Medina Gallo family history includes Heart Disease in his father and sister; Hypertension in his father, mother, and sister.     Problem list, medications, and allergies reviewed by myself today in Epic.     Objective:     /82   Pulse 79   Temp 36.6 °C (97.9 °F) (Temporal)   Resp 16   Ht 1.651 m (5' 5\")   Wt 111 kg (245 lb)   SpO2 97%   BMI 40.77 kg/m²     Physical Exam  Vitals reviewed.   Constitutional:       General: He is not in acute distress.     Appearance: Normal appearance. He is not ill-appearing or toxic-appearing.   Eyes:      Conjunctiva/sclera: Conjunctivae normal.      Pupils: Pupils are equal, round, and reactive to light.   Cardiovascular:      Rate and Rhythm: Normal rate.   Pulmonary:      Effort: Pulmonary effort is normal.   Musculoskeletal:      Cervical back: Neck supple.        Feet:    Feet:      Comments: Right foot:   Full ROM.   Strength 5/5   Minimal erythema and edema to right small toe.   Tenderness to palpation to right small toe and dorsal put.   Negative abrasion or laceration. Negative streaking.   Dorsalis pedis pulse intact.   Sensation intact.   Capillary refill < 2 seconds.   Skin:     General: Skin is warm and dry.   Neurological:      General: No focal deficit present.      Mental Status: He is alert and oriented to person, place, and time.   Psychiatric:         Mood and Affect: Mood normal.         Behavior: Behavior normal.         Diagnosis and associated orders:     1. Acute gout of right foot, unspecified cause  indomethacin (INDOCIN) 50 MG Cap      Comments/MDM:     • S/S consistent with prior history of gout symptoms.  He states indomethacin is efficient resolving his symptoms.  He states steroids do not help as much.  • Indomethacin as prescribed  • Elevation, ice application, avoid purine rich foods, avoid " exacerbating foods, drinks, activities.     I personally reviewed prior external notes and test results pertinent to today's visit.  Supportive care, natural history, differential diagnoses, and indications for immediate follow-up discussed. Patient expresses understanding and agrees to plan. Patient denies any other questions or concerns.     Advised the patient to follow-up with the primary care physician for recheck, reevaluation, and consideration of further management.    Please note that this dictation was created using voice recognition software. I have made a reasonable attempt to correct obvious errors, but I expect that there are errors of grammar and possibly content that I did not discover before finalizing the note.    This note was electronically signed by Joseph eTllez PA-C

## 2021-09-26 DIAGNOSIS — E78.5 DYSLIPIDEMIA: ICD-10-CM

## 2021-09-27 RX ORDER — ATORVASTATIN CALCIUM 10 MG/1
5 TABLET, FILM COATED ORAL DAILY
Qty: 45 TABLET | Refills: 3 | Status: SHIPPED | OUTPATIENT
Start: 2021-09-27 | End: 2022-12-13

## 2021-10-03 ENCOUNTER — OFFICE VISIT (OUTPATIENT)
Dept: URGENT CARE | Facility: PHYSICIAN GROUP | Age: 31
End: 2021-10-03
Payer: COMMERCIAL

## 2021-10-03 VITALS
SYSTOLIC BLOOD PRESSURE: 140 MMHG | HEART RATE: 89 BPM | WEIGHT: 243.4 LBS | HEIGHT: 65 IN | DIASTOLIC BLOOD PRESSURE: 96 MMHG | TEMPERATURE: 99 F | BODY MASS INDEX: 40.55 KG/M2 | RESPIRATION RATE: 20 BRPM | OXYGEN SATURATION: 96 %

## 2021-10-03 DIAGNOSIS — M10.9 ACUTE GOUT OF RIGHT FOOT, UNSPECIFIED CAUSE: ICD-10-CM

## 2021-10-03 PROCEDURE — 99214 OFFICE O/P EST MOD 30 MIN: CPT | Performed by: FAMILY MEDICINE

## 2021-10-03 RX ORDER — INDOMETHACIN 50 MG/1
50 CAPSULE ORAL 3 TIMES DAILY
Qty: 30 CAPSULE | Refills: 1 | Status: SHIPPED | OUTPATIENT
Start: 2021-10-03 | End: 2022-01-06

## 2021-10-03 RX ORDER — METHYLPREDNISOLONE 4 MG/1
TABLET ORAL
Qty: 21 TABLET | Refills: 0 | Status: SHIPPED | OUTPATIENT
Start: 2021-10-03 | End: 2021-10-31

## 2021-10-03 ASSESSMENT — ENCOUNTER SYMPTOMS
SORE THROAT: 0
FEVER: 0
CHILLS: 0
DIZZINESS: 0
SHORTNESS OF BREATH: 0
VOMITING: 0
COUGH: 0
MYALGIAS: 0
NAUSEA: 0

## 2021-10-03 NOTE — PROGRESS NOTES
Subjective:   Medina Gallo is a 31 y.o. male who presents for Gout (R foot poss gout, swelling, difficulty applying pressure, onset yesterday at 7am)        Foot Problem  This is a new problem. The current episode started today. The problem occurs intermittently. Pertinent negatives include no chills, coughing, fever, myalgias, nausea, rash, sore throat or vomiting. Associated symptoms comments: Similar symptoms to previous episodes of acute gout    Patient has employed dietary modifications, denies excessive alcohol intake. Exacerbated by: Previous history of exacerbations with alcohol intake. Treatments tried: Indomethacin. The treatment provided moderate relief.     PMH:  has a past medical history of Cold, Diabetes (HCC), Dyslipidemia (12/2/2015), Fatty liver, Gout, High cholesterol, Hypertension (2015), Liver mass, Obese, Sleep apnea, and Snoring. He also has no past medical history of Allergy.  MEDS:   Current Outpatient Medications:   •  methylPREDNISolone (MEDROL DOSEPAK) 4 MG Tablet Therapy Pack, Follow schedule on package instructions., Disp: 21 Tablet, Rfl: 0  •  indomethacin (INDOCIN) 50 MG Cap, Take 1 Capsule by mouth 3 times a day., Disp: 30 Capsule, Rfl: 1  •  atorvastatin (LIPITOR) 10 MG Tab, Take 0.5 Tablets by mouth every day., Disp: 45 Tablet, Rfl: 3  •  indomethacin (INDOCIN) 50 MG Cap, Take 1 capsule by mouth 3 times a day., Disp: 90 capsule, Rfl: 0  •  carvedilol (COREG) 25 MG Tab, Take 1 tablet by mouth 2 times a day with meals., Disp: 180 tablet, Rfl: 3  •  metFORMIN (GLUCOPHAGE) 850 MG Tab, Take 1 tablet by mouth 2 times a day with meals., Disp: 180 tablet, Rfl: 3  •  spironolactone (ALDACTONE) 25 MG Tab, Take 1 tablet by mouth every day., Disp: 90 tablet, Rfl: 3  •  amLODIPine (NORVASC) 10 MG Tab, Take 1 tablet by mouth every day. (Patient taking differently: Take 10 mg by mouth every day. Taking every other day.), Disp: 90 tablet, Rfl: 3  ALLERGIES: No Known Allergies  SURGHX:  "  Past Surgical History:   Procedure Laterality Date   • GASTROSCOPY  8/24/2018    Procedure: GASTROSCOPY - POSS BIOPSY, DILATION, POLYPECTOMY, CONTROL OF HEMORRHAGE;  Surgeon: Munir Thomas M.D.;  Location: [facility];  Service: Gastroenterology   • EGD W/ENDOSCOPIC ULTRASOUND  8/24/2018    Procedure: EGD W/ENDOSCOPIC ULTRASOUND;  Surgeon: Munir Thomas M.D.;  Location: [facility];  Service: Gastroenterology   • EGD WITH ASP/BX  8/24/2018    Procedure: EGD WITH ASP/BX - FNA;  Surgeon: Munir Thomas M.D.;  Location: [facility];  Service: Gastroenterology   • HEPATIC ABLATION LAPAROSCOPIC  5/9/2013    Performed by Shai Ding M.D. at SURGERY [facility]   • HEPATIC ABLATION LAPAROSCOPIC  11/19/2012    Performed by Shai Ding M.D. at SURGERY [facility]   • RECOVERY  11/15/2012    Performed by [facility] at SURGERY SAME DAY [facility]   • LIVER BIOPSY  11/9/12 and 11/15/12     SOCHX:  reports that he has never smoked. He has never used smokeless tobacco. He reports previous alcohol use. He reports that he does not use drugs.  FH:   Family History   Problem Relation Age of Onset   • Hypertension Mother    • Heart Disease Father    • Hypertension Father    • Hypertension Sister    • Heart Disease Sister      Review of Systems   Constitutional: Negative for chills and fever.   HENT: Negative for sore throat.    Respiratory: Negative for cough and shortness of breath.    Gastrointestinal: Negative for nausea and vomiting.   Musculoskeletal: Negative for myalgias.   Skin: Negative for rash.   Neurological: Negative for dizziness.        Objective:   /96 (BP Location: Right arm, Patient Position: Sitting, BP Cuff Size: Adult)   Pulse 89   Temp 37.2 °C (99 °F) (Temporal)   Resp 20   Ht 1.651 m (5' 5\")   Wt 110 kg (243 lb 6.4 oz)   SpO2 96%   BMI 40.50 kg/m²   Physical Exam  Vitals and nursing note reviewed. "   Constitutional:       General: He is not in acute distress.     Appearance: He is well-developed.   HENT:      Head: Normocephalic and atraumatic.      Right Ear: External ear normal.      Left Ear: External ear normal.      Nose: Nose normal.      Mouth/Throat:      Mouth: Mucous membranes are moist.   Eyes:      Conjunctiva/sclera: Conjunctivae normal.   Cardiovascular:      Rate and Rhythm: Normal rate.   Pulmonary:      Effort: Pulmonary effort is normal. No respiratory distress.      Breath sounds: Normal breath sounds.   Abdominal:      General: There is no distension.   Musculoskeletal:         General: Normal range of motion.        Feet:    Skin:     General: Skin is warm and dry.   Neurological:      General: No focal deficit present.      Mental Status: He is alert and oriented to person, place, and time. Mental status is at baseline.      Gait: Gait abnormal (Minimal antalgic gait right).   Psychiatric:         Judgment: Judgment normal.           Assessment/Plan:   1. Acute gout of right foot, unspecified cause  - methylPREDNISolone (MEDROL DOSEPAK) 4 MG Tablet Therapy Pack; Follow schedule on package instructions.  Dispense: 21 Tablet; Refill: 0  - indomethacin (INDOCIN) 50 MG Cap; Take 1 Capsule by mouth 3 times a day.  Dispense: 30 Capsule; Refill: 1        Medical Decision Making/Course:  In the course of preparing for this visit with review of the pertinent past medical history, recent and past clinic visits, current medications, and performing chart, immunization, medical history and medication reconciliation, and in the further course of obtaining the current history pertinent to the clinic visit today, performing an exam and evaluation, ordering and independently evaluating labs, tests, and/or procedures, prescribing any recommended new medications as noted above, providing any pertinent counseling and education and recommending further coordination of care, at least 20 minutes of total time  were spent during this encounter.      Discussed close monitoring, return precautions, and supportive measures of maintaining adequate fluid hydration and caloric intake, relative rest and symptom management as needed for pain and/or fever.    Differential diagnosis, natural history, supportive care, and indications for immediate follow-up discussed.     Advised the patient to follow-up with the primary care physician for recheck, reevaluation, and consideration of further management.    Please note that this dictation was created using voice recognition software. I have worked with consultants from the vendor as well as technical experts from [employer] to optimize the interface. I have made every reasonable attempt to correct obvious errors, but I expect that there are errors of grammar and possibly content that I did not discover before finalizing the note.

## 2021-10-19 DIAGNOSIS — I10 ESSENTIAL HYPERTENSION: ICD-10-CM

## 2021-10-20 RX ORDER — CARVEDILOL 25 MG/1
25 TABLET ORAL 2 TIMES DAILY WITH MEALS
Qty: 180 TABLET | Refills: 3 | Status: SHIPPED | OUTPATIENT
Start: 2021-10-20 | End: 2022-06-28 | Stop reason: SDUPTHER

## 2021-10-31 ENCOUNTER — OFFICE VISIT (OUTPATIENT)
Dept: URGENT CARE | Facility: PHYSICIAN GROUP | Age: 31
End: 2021-10-31
Payer: COMMERCIAL

## 2021-10-31 VITALS
TEMPERATURE: 98.1 F | DIASTOLIC BLOOD PRESSURE: 90 MMHG | HEIGHT: 65 IN | WEIGHT: 243 LBS | BODY MASS INDEX: 40.48 KG/M2 | SYSTOLIC BLOOD PRESSURE: 132 MMHG | OXYGEN SATURATION: 97 % | HEART RATE: 101 BPM | RESPIRATION RATE: 16 BRPM

## 2021-10-31 DIAGNOSIS — M10.9 ACUTE GOUT OF LEFT FOOT, UNSPECIFIED CAUSE: ICD-10-CM

## 2021-10-31 PROCEDURE — 99213 OFFICE O/P EST LOW 20 MIN: CPT | Performed by: FAMILY MEDICINE

## 2021-10-31 RX ORDER — INDOMETHACIN 50 MG/1
50 CAPSULE ORAL 3 TIMES DAILY PRN
Qty: 30 CAPSULE | Refills: 1 | Status: SHIPPED | OUTPATIENT
Start: 2021-10-31 | End: 2022-02-08 | Stop reason: SDUPTHER

## 2021-10-31 RX ORDER — COLCHICINE 0.6 MG/1
TABLET ORAL
Qty: 10 TABLET | Refills: 1 | Status: SHIPPED | OUTPATIENT
Start: 2021-10-31 | End: 2022-02-08 | Stop reason: SDUPTHER

## 2021-10-31 ASSESSMENT — ENCOUNTER SYMPTOMS
FOCAL WEAKNESS: 0
SENSORY CHANGE: 0

## 2021-10-31 NOTE — PROGRESS NOTES
"Subjective     Gabrielwest Emile Gallo is a 31 y.o. male who presents with Foot Pain (L foot pain, swelling, x2 days )            2 days left foot and ankle pain.  Swelling and warmth consistent with his PMH gout.  FH gout.  This is likely triggered by recent wedding and increased EtOH intake.  No trauma.  No fever.  No other aggravating or alleviating factors.      Review of Systems   Musculoskeletal:        No other joints affected.   Skin: Negative for rash.   Neurological: Negative for sensory change and focal weakness.              Objective     /90 (BP Location: Right arm, Patient Position: Sitting, BP Cuff Size: Large adult)   Pulse (!) 101   Temp 36.7 °C (98.1 °F) (Temporal)   Resp 16   Ht 1.651 m (5' 5\")   Wt 110 kg (243 lb)   SpO2 97%   BMI 40.44 kg/m²      Physical Exam  Constitutional:       Appearance: Normal appearance.   Musculoskeletal:        Feet:    Skin:     General: Skin is warm and dry.   Neurological:      General: No focal deficit present.      Mental Status: He is alert.                             Assessment & Plan        1. Acute gout of left foot, unspecified cause    - indomethacin (INDOCIN) 50 MG Cap; Take 1 Capsule by mouth 3 times a day as needed.  Dispense: 30 Capsule; Refill: 1  - colchicine (COLCRYS) 0.6 MG Tab; 2PO then 1PO in 1 hour if pain persists, maximum 3 tablets day #1, then 1 PO daily for 1 week  Dispense: 10 Tablet; Refill: 1     Differential diagnosis, natural history, supportive care, and indications for immediate follow-up discussed at length.              "

## 2021-12-01 ENCOUNTER — APPOINTMENT (OUTPATIENT)
Dept: VASCULAR LAB | Facility: MEDICAL CENTER | Age: 31
End: 2021-12-01
Payer: COMMERCIAL

## 2021-12-10 DIAGNOSIS — E11.9 TYPE 2 DIABETES MELLITUS WITHOUT COMPLICATION, WITHOUT LONG-TERM CURRENT USE OF INSULIN (HCC): ICD-10-CM

## 2021-12-10 DIAGNOSIS — R73.03 PREDIABETES: ICD-10-CM

## 2022-01-06 ENCOUNTER — OFFICE VISIT (OUTPATIENT)
Dept: VASCULAR LAB | Facility: MEDICAL CENTER | Age: 32
End: 2022-01-06
Attending: INTERNAL MEDICINE
Payer: COMMERCIAL

## 2022-01-06 VITALS
DIASTOLIC BLOOD PRESSURE: 83 MMHG | HEART RATE: 91 BPM | SYSTOLIC BLOOD PRESSURE: 138 MMHG | WEIGHT: 247 LBS | BODY MASS INDEX: 41.15 KG/M2 | HEIGHT: 65 IN

## 2022-01-06 DIAGNOSIS — I10 ESSENTIAL HYPERTENSION: ICD-10-CM

## 2022-01-06 DIAGNOSIS — E78.5 DYSLIPIDEMIA: ICD-10-CM

## 2022-01-06 DIAGNOSIS — G47.33 OSA (OBSTRUCTIVE SLEEP APNEA): ICD-10-CM

## 2022-01-06 DIAGNOSIS — E11.9 TYPE 2 DIABETES MELLITUS WITHOUT COMPLICATION, WITHOUT LONG-TERM CURRENT USE OF INSULIN (HCC): ICD-10-CM

## 2022-01-06 PROCEDURE — 99214 OFFICE O/P EST MOD 30 MIN: CPT | Performed by: NURSE PRACTITIONER

## 2022-01-06 PROCEDURE — 99212 OFFICE O/P EST SF 10 MIN: CPT

## 2022-01-06 RX ORDER — LOSARTAN POTASSIUM 25 MG/1
25 TABLET ORAL DAILY
Qty: 30 TABLET | Refills: 6 | Status: SHIPPED | OUTPATIENT
Start: 2022-01-06 | End: 2022-01-25 | Stop reason: SDUPTHER

## 2022-01-07 NOTE — PROGRESS NOTES
" Resistant Hypertension Follow Up Visit  1/6/22    Assessment / Plan:   1. Blood Pressure Control:  ACC/AHA (2017) goal <130/80  Home BP at goal:  No  Office BP at goal: No  Home readings mostly high 130/80s, with new, upper-arm cuff  His job has been very stressful; will be switching to \"swing shift,\" in Feb  Plan:   BP elevated in-office readings last several visits  - continue home BP monitoring, reviewed correct technique  - Under reasonable control previously on ABPM;  consider rpt in future    2. Work up of Secondary Causes of Resistant Hypertension:   Renovascular HTN: Renal artery duplex with no evidence or MEGAN (2015), but may not be best test for potential FMD  Primary Aldosteronism: Excluded ARR normal, No adrenal adenoma noted on previous imaging  Thyroid Function: Excluded - TSH WNL 9/9/17  Obstructive Sleep Apnea: Postive - back on CPAP, doing well  Pheochromocytoma: Excluded 24 hour urine normal July 2014  Instrinsic renal disease - normal GFR and kidneys unremarkable appearing on sono  Coarctation - not seen on previous imaging/angiogram  Recommendations At This Visit:   - consider MRA renal arteries in future if BP becomes more difficult to control    3. Medication Use / Adherence:  Assessment: Completely compliant  Recommendations: Instructed to Continue Taking All Medications As Prescribed         4. End Organ Damage:   Left Ventricular Hypertrophy: Present on  Echocardiogram Date: 2015  Albuminuria: improved 9/9/17: 36  Renal Function: Chronic Kidney Disease  Stage 2 at worst (GFR >60)- due for labs  Established Cardiovascular Disease: None    5. Lifestyle Recommendations:    Smoking: continued complete avoidance of all tobacco products     Physical Activity: encouraged more healthy activity to improve CV fitness; increase exercise and weight loss-- work has been the biggest barrier to going to gym as he is currently working 6 days per week. He is hopeful that with his upcoming shift change he " will have more time to dedicate to exercise    Weight Management and Nutrition: Dietary plan was discussed with patient at this visit including continued DASH, low sodium diet    6. Standard HTN Pharmacotherapy:  ACEI/ARB:  Start losartan 25mg daily  Thiazide Type Diuretic: Avoid given gout and hypokalemia  Calcium Channel Blocker (CCB): unclear if taking; no notes of taking him off this med but not on active med list.  MA to call to clarify    7. Additional Agents:  Beta Blocker - continue carvedilol 25mg BID   Mineralocorticoid Receptor Antagonist (MRA) - continue spironolactone 25 mg daily   Peripheral alpha-blockers:  Not indicated at this time   Central alpha-agonists:  Not indicated   Direct vasodilators:  Not indicated   Other:  None      8. Other CV Risk Factors:   Lipids - ASCVD risk >10%  LDLP # and small LDLP well control  TC and LDL still low  TG normal; elevated at baseline  5/29/2021: NonHDL-78, LDL-51  Plan:  - Continue atorvastatin 5mg for now  - Continue vit D 4000 units daily   - Recheck lipids once fasting    Prediabetes, pmhx of borderline T2D (highest a1c = 6.6)  Last A1c = 5.5  1/2020  Plan:  - encouraged more healthy diet, weight reduction, daily physical activity   - continue metformin 850mg BID to slow or offset progression to T2D as per DPP trial   - A1C with next labs, as previously ordered    9. 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 Ding.  LFTs stable   - defer all further w/u and management to GI and PCP    Studies Ordered At This Visit:   None   Blood Work to Be Obtained Prior to Next Visit:  CMP, lipids, a1c as previously ordered  Follow Up:  6wks for BP and lab review    Diagnosis:  1. Essential hypertension  losartan (COZAAR) 25 MG Tab      History  "of Present Illness:   Medina Gallo is a male seen for f/u of hypertension, dyslidipidemia, [id number], gout and GALILEA    HTN:  Current HTN concerns:  None at present, although concerned with higher readings both at home and in office.  Unclear if/why he is off amlodipine  ADRs: No  HTN sx:  No current blurred or changed vision, chest pain, shortness of breath, headache, nausea, dizziness/vertigo   Home BP log:  High 130/80s  24h ABPM completed: consider rpt once meds stabalized  Adherence to current HTN meds: misses 1 dose/wk he states d/t crazy work sched; hopefully this will improve with sched change next mo    Hyperlipidemia:    Stable, no current concerns  Current treatment: atorva 5mg   Myalgias? No  Other adverse drug reactions? No  Lipid profile: due    GALILEA:   On CPAP    PreDM:   Tolerating meds, mild weight gain.  No recent A1C    Gout:   Stable. No gout flares at present    Social History     Tobacco Use   • Smoking status: Never Smoker   • Smokeless tobacco: Never Used   Vaping Use   • Vaping Use: Never used   Substance Use Topics   • Alcohol use: Not Currently     Alcohol/week: 0.0 oz     Comment: 1 per week   • Drug use: No     SOCIAL HISTORY  Weight - up a few lbs  BMI Readings from Last 4 Encounters:   01/06/22 41.10 kg/m²   10/31/21 40.44 kg/m²   10/03/21 40.50 kg/m²   08/25/21 40.77 kg/m²      No smoking        Objective:   Allergies:  Patient has no known allergies.    Vitals:    01/06/22 1625 01/06/22 1629   BP: 142/88 138/83   BP Location: Left arm Left arm   Patient Position: Sitting Sitting   BP Cuff Size: Large adult Large adult   Pulse: 89 91   Weight: 112 kg (247 lb)    Height: 1.651 m (5' 5\")      BP Readings from Last 4 Encounters:   01/06/22 138/83   10/31/21 132/90   10/03/21 140/96   08/25/21 144/82      Body mass index is 41.1 kg/m².      Physical Exam  Constitutional:       General: He is not in acute distress.     Appearance: He is well-developed. He is obese. He is not " diaphoretic.   Neck:      Vascular: Normal carotid pulses. No carotid bruit.      Trachea: Trachea normal.   Cardiovascular:      Rate and Rhythm: Normal rate and regular rhythm.      Pulses: Normal pulses.      Heart sounds: Normal heart sounds. No murmur heard.      Pulmonary:      Effort: Pulmonary effort is normal. No respiratory distress.      Breath sounds: Normal breath sounds. No wheezing.   Musculoskeletal:         General: No swelling. Normal range of motion.   Skin:     General: Skin is warm and dry.      Coloration: Skin is not pale.      Findings: No erythema.      Nails: There is no clubbing.   Neurological:      Mental Status: He is alert and oriented to person, place, and time.      Motor: No weakness.      Gait: Gait normal.   Psychiatric:         Mood and Affect: Mood normal.         Behavior: Behavior normal. Behavior is cooperative.         Thought Content: Thought content normal.        Accessory Clinical Findings:   Echocardiogram:  Results for orders placed or performed during the hospital encounter of 07/28/15   ECHOCARDIOGRAM COMP W/O CONT   Result Value Ref Range    Eject.Frac. MOD BP 59.37     Eject.Frac. MOD 4C 62.42     Eject.Frac. MOD 2C 60.22       Lab Results   Component Value Date    CHOLSTRLTOT 110 05/29/2021    LDL 51 05/29/2021    HDL 32 (A) 05/29/2021    TRIGLYCERIDE 137 05/29/2021      Lab Results   Component Value Date    HBA1C 5.6 01/09/2021      Lab Results   Component Value Date    SODIUM 138 05/29/2021    POTASSIUM 4.3 05/29/2021    CHLORIDE 103 05/29/2021    CO2 23 05/29/2021    GLUCOSE 105 (H) 05/29/2021    BUN 15 05/29/2021    CREATININE 1.10 05/29/2021     Lab Results   Component Value Date    URCREAT 27.11 05/29/2021    MICROALBUR <1.2 05/29/2021    MALBCRT see below 05/29/2021        Multiple imaging studies and lab reports were available in EMR and reviewed at today's visit    TIMBO Jacques.

## 2022-01-12 DIAGNOSIS — I10 ESSENTIAL HYPERTENSION: ICD-10-CM

## 2022-01-12 RX ORDER — AMLODIPINE BESYLATE 10 MG/1
10 TABLET ORAL DAILY
Qty: 30 TABLET | Refills: 6 | Status: SHIPPED | OUTPATIENT
Start: 2022-01-12 | End: 2023-06-14 | Stop reason: SDUPTHER

## 2022-01-12 NOTE — PROGRESS NOTES
Spoke with pt via "[employer].  He has been on amlodipine 10mg qod, but unsure why qod dosing.  Will have him continue, but take 10mg daily, Rx sent and med rec updated.      TATYANA Ledezma  [facility]

## 2022-01-25 DIAGNOSIS — I10 ESSENTIAL HYPERTENSION: ICD-10-CM

## 2022-01-25 RX ORDER — LOSARTAN POTASSIUM 25 MG/1
25 TABLET ORAL DAILY
Qty: 30 TABLET | Refills: 6 | Status: SHIPPED | OUTPATIENT
Start: 2022-01-25 | End: 2022-09-02

## 2022-02-08 ENCOUNTER — OFFICE VISIT (OUTPATIENT)
Dept: URGENT CARE | Facility: PHYSICIAN GROUP | Age: 32
End: 2022-02-08
Payer: COMMERCIAL

## 2022-02-08 VITALS
BODY MASS INDEX: 40.82 KG/M2 | HEART RATE: 81 BPM | OXYGEN SATURATION: 96 % | DIASTOLIC BLOOD PRESSURE: 78 MMHG | HEIGHT: 65 IN | SYSTOLIC BLOOD PRESSURE: 124 MMHG | TEMPERATURE: 97.3 F | RESPIRATION RATE: 18 BRPM | WEIGHT: 245 LBS

## 2022-02-08 DIAGNOSIS — M10.9 ACUTE GOUT OF RIGHT FOOT, UNSPECIFIED CAUSE: ICD-10-CM

## 2022-02-08 PROCEDURE — 99214 OFFICE O/P EST MOD 30 MIN: CPT | Performed by: PHYSICIAN ASSISTANT

## 2022-02-08 RX ORDER — INDOMETHACIN 50 MG/1
50 CAPSULE ORAL 3 TIMES DAILY PRN
Qty: 30 CAPSULE | Refills: 0 | Status: SHIPPED | OUTPATIENT
Start: 2022-02-08 | End: 2022-04-03 | Stop reason: SDUPTHER

## 2022-02-08 RX ORDER — COLCHICINE 0.6 MG/1
TABLET ORAL
Qty: 10 TABLET | Refills: 0 | Status: SHIPPED | OUTPATIENT
Start: 2022-02-08 | End: 2022-04-03 | Stop reason: SDUPTHER

## 2022-02-08 ASSESSMENT — ENCOUNTER SYMPTOMS
VOMITING: 0
SHORTNESS OF BREATH: 0
SORE THROAT: 0
EYE REDNESS: 0
NAUSEA: 0
HEADACHES: 0
EYE DISCHARGE: 0
FEVER: 0
COUGH: 0

## 2022-02-08 NOTE — PROGRESS NOTES
Adi Gallo is a 31 y.o. male who presents with Gout (right foot,painful,x2 days)            HPI    This is a new problem.   The patient presents to clinic complaining of gout to the right foot x2 days. The patient reports a history of gout.  The patient states he developed pain/soreness, swelling, and redness to his right foot, which is consistent with his previous gout flares.  The patient reports no recent injury or trauma to his right foot.  The patient notes slight decreased range of motion of the right foot secondary to pain.  He also notes increased pain with walking.  The patient reports no numbness, tingling, or weakness.  No fever.  The patient has taken OTC Aleve for his current symptoms.    PMH:  has a past medical history of Cold, Diabetes (HCC), Dyslipidemia (12/2/2015), Fatty liver, Gout, High cholesterol, Hypertension (2015), Liver mass, Obese, Sleep apnea, and Snoring. He also has no past medical history of Allergy.  MEDS:   Current Outpatient Medications:   •  losartan (COZAAR) 25 MG Tab, Take 1 Tablet by mouth every day., Disp: 30 Tablet, Rfl: 6  •  amLODIPine (NORVASC) 10 MG Tab, Take 1 Tablet by mouth every day., Disp: 30 Tablet, Rfl: 6  •  metFORMIN (GLUCOPHAGE) 850 MG Tab, Take 1 Tablet by mouth 2 times a day with meals., Disp: 180 Tablet, Rfl: 3  •  indomethacin (INDOCIN) 50 MG Cap, Take 1 Capsule by mouth 3 times a day as needed., Disp: 30 Capsule, Rfl: 1  •  colchicine (COLCRYS) 0.6 MG Tab, 2PO then 1PO in 1 hour if pain persists, maximum 3 tablets day #1, then 1 PO daily for 1 week, Disp: 10 Tablet, Rfl: 1  •  carvedilol (COREG) 25 MG Tab, Take 1 Tablet by mouth 2 times a day with meals., Disp: 180 Tablet, Rfl: 3  •  atorvastatin (LIPITOR) 10 MG Tab, Take 0.5 Tablets by mouth every day., Disp: 45 Tablet, Rfl: 3  •  spironolactone (ALDACTONE) 25 MG Tab, Take 1 tablet by mouth every day., Disp: 90 tablet, Rfl: 3  ALLERGIES: No Known Allergies  SURGHX:   Past  "Surgical History:   Procedure Laterality Date   • GASTROSCOPY  8/24/2018    Procedure: GASTROSCOPY - POSS BIOPSY, DILATION, POLYPECTOMY, CONTROL OF HEMORRHAGE;  Surgeon: Munir Thomas M.D.;  Location: [facility];  Service: Gastroenterology   • EGD W/ENDOSCOPIC ULTRASOUND  8/24/2018    Procedure: EGD W/ENDOSCOPIC ULTRASOUND;  Surgeon: Munir Thomas M.D.;  Location: [facility];  Service: Gastroenterology   • EGD WITH ASP/BX  8/24/2018    Procedure: EGD WITH ASP/BX - FNA;  Surgeon: Munir Thomas M.D.;  Location: [facility];  Service: Gastroenterology   • HEPATIC ABLATION LAPAROSCOPIC  5/9/2013    Performed by Shai Ding M.D. at SURGERY [facility]   • HEPATIC ABLATION LAPAROSCOPIC  11/19/2012    Performed by Shai Ding M.D. at SURGERY [facility]   • RECOVERY  11/15/2012    Performed by [facility] at SURGERY SAME DAY [facility]   • LIVER BIOPSY  11/9/12 and 11/15/12     SOCHX:  reports that he has never smoked. He has never used smokeless tobacco. He reports previous alcohol use. He reports that he does not use drugs.  FH: Family history was reviewed, no pertinent findings to report      Review of Systems   Constitutional: Negative for fever.   HENT: Negative for congestion, ear pain and sore throat.    Eyes: Negative for discharge and redness.   Respiratory: Negative for cough and shortness of breath.    Cardiovascular: Negative for chest pain and leg swelling.   Gastrointestinal: Negative for nausea and vomiting.   Musculoskeletal: Positive for joint pain (right foot).   Skin: Negative for rash.   Neurological: Negative for headaches.              Objective     /78 (BP Location: Right arm, Patient Position: Sitting, BP Cuff Size: Adult)   Pulse 81   Temp 36.3 °C (97.3 °F) (Temporal)   Resp 18   Ht 1.651 m (5' 5\")   Wt 111 kg (245 lb)   SpO2 96%   BMI 40.77 kg/m²      Physical Exam  Constitutional:      "  General: He is not in acute distress.     Appearance: Normal appearance. He is well-developed. He is not ill-appearing.   HENT:      Head: Normocephalic and atraumatic.      Right Ear: External ear normal.      Left Ear: External ear normal.   Eyes:      Extraocular Movements: Extraocular movements intact.      Conjunctiva/sclera: Conjunctivae normal.   Cardiovascular:      Rate and Rhythm: Normal rate.   Pulmonary:      Effort: Pulmonary effort is normal.   Musculoskeletal:      Cervical back: Normal range of motion and neck supple.      Comments:   Right Foot:  Tenderness to the proximal medial aspect of the right foot with trace edema.  No ecchymosis.  No overlying erythema.  No increased warmth.  No open wounds/lesions.  ROM intact -the patient demonstrates full active range of motion of the right foot; the patient notes increased pain with movement of the right foot/ankle  Neurovascular intact distally  Strength 5/5 -dorsiflexion/plantarflexion of the right ankle/foot against resistance  Normal gait   Skin:     General: Skin is warm and dry.   Neurological:      Mental Status: He is alert and oriented to person, place, and time.                             Assessment & Plan          1. Acute gout of right foot, unspecified cause  - indomethacin (INDOCIN) 50 MG Cap; Take 1 Capsule by mouth 3 times a day as needed.  Dispense: 30 Capsule; Refill: 0  - colchicine (COLCRYS) 0.6 MG Tab; Take 1.2mg (2 tabs) PO x once. Take 0.6mg (1 tab) PO x 1 hour later if pain persists. Do not exceed 3 tabs in 24 hours. Take 0.6mg (1 tab) PO daily x 7 days.  Dispense: 10 Tablet; Refill: 0    The patient's presenting symptoms and physical exam endings are consistent with acute gout flare of the right foot.  The patient has a history of gout.  This is an acute exacerbation of a chronic problem.  The patient has had good improvement of his gout flares with colchicine and indomethacin.  Will prescribe the patient these medications at  this time for his current symptoms.  Advised patient to monitor worsening signs and or symptoms.  Recommend OTC medications and supportive care for symptomatic management.  Recommend the patient follow-up with his PCP as needed.  Discussed return precautions with the patient, and he verbalized understanding.    Differential diagnoses, supportive care, and indications for immediate follow-up discussed with patient.   Instructed to return to clinic or nearest emergency department for any change in condition, further concerns, or worsening of symptoms.    OTC Tylenol for pain/discomfort   --Advised patient not to take additional NSAIDs with the prescribed indomethacin  RICE  Weight-bearing as tolerated  Follow-up with PCP   Return to clinic or go tot he ED if symptoms worsen or fail to improve, or if the patient should develop worsening/increasing pain/tenderness, swelling, bruising, redness or warmth to the affected area, decreased ROM, numbness, tingling or weakness, difficulty walking, fever/chills, and/or any concerning symptoms.     Discussed plan with the patient, and he agrees to the above.    I personally reviewed prior external notes and test results pertinent to today's visit.  I have independently reviewed and interpreted all diagnostics ordered during this urgent care visit.     Time spent evaluating this patient was at least 30 minutes and includes preparing for visit, obtaining history, exam and evaluation, ordering labs/tests/procedures/medications, independent interpretation, and counseling/education.    Please note that this dictation was created using voice recognition software. I have made every reasonable attempt to correct obvious errors, but I expect that there may be errors of grammar and possibly content that I did not discover before finalizing the note.     This note was electronically signed by Rachael Welch PA-C

## 2022-02-22 ENCOUNTER — HOSPITAL ENCOUNTER (OUTPATIENT)
Dept: LAB | Facility: MEDICAL CENTER | Age: 32
End: 2022-02-22
Attending: NURSE PRACTITIONER
Payer: COMMERCIAL

## 2022-02-22 DIAGNOSIS — E78.5 DYSLIPIDEMIA: ICD-10-CM

## 2022-02-22 DIAGNOSIS — E11.9 TYPE 2 DIABETES MELLITUS WITHOUT COMPLICATION, WITHOUT LONG-TERM CURRENT USE OF INSULIN (HCC): ICD-10-CM

## 2022-02-22 LAB
ANION GAP SERPL CALC-SCNC: 12 MMOL/L (ref 7–16)
BUN SERPL-MCNC: 19 MG/DL (ref 8–22)
CALCIUM SERPL-MCNC: 9.6 MG/DL (ref 8.5–10.5)
CHLORIDE SERPL-SCNC: 100 MMOL/L (ref 96–112)
CHOLEST SERPL-MCNC: 113 MG/DL (ref 100–199)
CO2 SERPL-SCNC: 22 MMOL/L (ref 20–33)
CREAT SERPL-MCNC: 1.23 MG/DL (ref 0.5–1.4)
EST. AVERAGE GLUCOSE BLD GHB EST-MCNC: 120 MG/DL
FASTING STATUS PATIENT QL REPORTED: NORMAL
GLUCOSE SERPL-MCNC: 105 MG/DL (ref 65–99)
HBA1C MFR BLD: 5.8 % (ref 4–5.6)
HDLC SERPL-MCNC: 27 MG/DL
LDLC SERPL CALC-MCNC: 48 MG/DL
POTASSIUM SERPL-SCNC: 4 MMOL/L (ref 3.6–5.5)
SODIUM SERPL-SCNC: 134 MMOL/L (ref 135–145)
TRIGL SERPL-MCNC: 189 MG/DL (ref 0–149)

## 2022-02-22 PROCEDURE — 80048 BASIC METABOLIC PNL TOTAL CA: CPT

## 2022-02-22 PROCEDURE — 36415 COLL VENOUS BLD VENIPUNCTURE: CPT

## 2022-02-22 PROCEDURE — 83036 HEMOGLOBIN GLYCOSYLATED A1C: CPT

## 2022-02-22 PROCEDURE — 80061 LIPID PANEL: CPT

## 2022-02-24 ENCOUNTER — OFFICE VISIT (OUTPATIENT)
Dept: VASCULAR LAB | Facility: MEDICAL CENTER | Age: 32
End: 2022-02-24
Attending: INTERNAL MEDICINE
Payer: COMMERCIAL

## 2022-02-24 VITALS
WEIGHT: 252 LBS | DIASTOLIC BLOOD PRESSURE: 83 MMHG | HEART RATE: 93 BPM | HEIGHT: 65 IN | BODY MASS INDEX: 41.99 KG/M2 | SYSTOLIC BLOOD PRESSURE: 122 MMHG

## 2022-02-24 DIAGNOSIS — E78.5 DYSLIPIDEMIA: ICD-10-CM

## 2022-02-24 DIAGNOSIS — I10 ESSENTIAL HYPERTENSION: ICD-10-CM

## 2022-02-24 DIAGNOSIS — E11.9 TYPE 2 DIABETES MELLITUS WITHOUT COMPLICATION, WITHOUT LONG-TERM CURRENT USE OF INSULIN (HCC): ICD-10-CM

## 2022-02-24 PROCEDURE — 99212 OFFICE O/P EST SF 10 MIN: CPT

## 2022-02-24 PROCEDURE — 99213 OFFICE O/P EST LOW 20 MIN: CPT | Performed by: NURSE PRACTITIONER

## 2022-02-24 NOTE — PROGRESS NOTES
Resistant Hypertension Follow Up Visit  2/24/22    Assessment / Plan:   1. Blood Pressure Control:  ACC/AHA (2017) goal <130/80  Home BP at goal:  Yes  Office BP at goal: diastolic mildly elevated today in the low 80s  Home readings much improved, routinely <130/80, using upper-arm cuff  Is now on new sched at work, feeling much less stress and has a better day to day routine  Plan:   - continue home BP monitoring, reviewed correct technique at last visit  - Under reasonable control previously on ABPM; could consider rpt in future    2. Work up of Secondary Causes of Resistant Hypertension:   Renovascular HTN: Renal artery duplex with no evidence or MEGAN (2015), but may not be best test for potential FMD  Primary Aldosteronism: Excluded ARR normal, No adrenal adenoma noted on previous imaging  Thyroid Function: Excluded - TSH WNL 9/9/17  Obstructive Sleep Apnea: Postive - on CPAP, doing well  Pheochromocytoma: Excluded 24 hour urine normal July 2014  Instrinsic renal disease - normal GFR and kidneys unremarkable appearing on sono  Coarctation - not seen on previous imaging/angiogram  Recommendations At This Visit:   - consider MRA renal arteries in future if BP becomes more difficult to control    3. Medication Use / Adherence:  Assessment: Completely compliant  Recommendations: Instructed to Continue Taking All Medications As Prescribed         4. End Organ Damage:   Left Ventricular Hypertrophy: Present on  Echocardiogram Date: 2015  Albuminuria: none 5/2021  Renal Function: Chronic Kidney Disease  Stage 2 at worst (GFR >60)  Established Cardiovascular Disease: None    5. Lifestyle Recommendations:    Smoking: continued complete avoidance of all tobacco products     Physical Activity: encouraged more healthy activity to improve CV fitness; increase exercise and weight loss.  He is hopeful that with his new work schedule he will have more time to dedicate to exercise    Weight Management and Nutrition: Dietary plan  was discussed with patient at this visit including continued DASH, low sodium diet    6. Standard HTN Pharmacotherapy:  ACEI/ARB:  continue losartan 25mg daily  Thiazide Type Diuretic: Avoid given gout and hypokalemia  Calcium Channel Blocker (CCB): continue amlodipine 10mg daily    7. Additional Agents:  Beta Blocker - continue carvedilol 25mg BID   Mineralocorticoid Receptor Antagonist (MRA) - continue spironolactone 25 mg daily   Peripheral alpha-blockers:  Not indicated at this time   Central alpha-agonists:  Not indicated   Direct vasodilators:  Not indicated   Other:  None      8. Other CV Risk Factors:   Lipids - ASCVD risk >10%  LDLP # and small LDLP well control  TC and LDL still low  TG elevated   2/2022 nonHDL-86, LDL-48,   Plan:  - Continue atorvastatin 5mg daily for now  - work on reducing TG in diet; discussed today  - Continue vit D 4000 units daily   - Recheck lipids in 6mo    Prediabetes, pmhx of borderline T2D (highest a1c = 6.6)  Last A1c = 5.8 on recent labs  Plan:  - encouraged more healthy diet, weight reduction, daily physical activity   - continue metformin 850mg BID to slow or offset progression to T2D as per DPP trial   - A1C with next labs    9. 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 Ding.  LFTs stable   - defer all further w/u and management to GI and PCP    Studies Ordered At This Visit:   None   Blood Work to Be Obtained Prior to Next Visit:  CMP, lipids, a1c  Follow Up:  6mo    Diagnosis:  1. Type 2 diabetes mellitus without complication, without long-term current use of insulin (HCC)  Comp Metabolic Panel    Lipid Profile    HEMOGLOBIN A1C (Glycohemoglobin GHB Total/A1C with MBG Estimate)   2. Essential hypertension  Comp Metabolic Panel   3.  "Dyslipidemia  Comp Metabolic Panel    Lipid Profile      History of Present Illness:   Medina Gallo is a male seen for f/u of hypertension, dyslidipidemia, [id number], gout and GALILEA    HTN:  Current HTN concerns:  None at present; doing much better with addition of ARB at last visit.  ADRs: No  HTN sx:  No current blurred or changed vision, chest pain, shortness of breath, headache, nausea, dizziness/vertigo   Home BP lo/70s  24h ABPM completed: could connsider rpt once meds stabalized  Adherence to current HTN meds: much better with improved daily work sched    Hyperlipidemia:    Stable, no current concerns  Current treatment: atorva 5mg   Myalgias? No  Other adverse drug reactions? No  Lipid profile: as above    GALILEA:   On CPAP    PreDM:   Tolerating meds, mild weight gain.      Gout:   Stable. No gout flares at present    Social History     Tobacco Use   • Smoking status: Never Smoker   • Smokeless tobacco: Never Used   Vaping Use   • Vaping Use: Never used   Substance Use Topics   • Alcohol use: Not Currently     Alcohol/week: 0.0 oz     Comment: 1 per week   • Drug use: No     SOCIAL HISTORY  Weight - up a few lbs  BMI Readings from Last 4 Encounters:   22 41.93 kg/m²   22 40.77 kg/m²   22 41.10 kg/m²   10/31/21 40.44 kg/m²      No smoking        Objective:   Allergies:  Patient has no known allergies.    Vitals:    22 0917 22 0921   BP: 133/85 122/83   BP Location: Left arm Left arm   Patient Position: Sitting Sitting   BP Cuff Size: Large adult Large adult   Pulse: 83 93   Weight: 114 kg (252 lb)    Height: 1.651 m (5' 5\")      BP Readings from Last 4 Encounters:   22 122/83   22 124/78   22 138/83   10/31/21 132/90      Body mass index is 41.93 kg/m².      Physical Exam  Constitutional:       General: He is not in acute distress.     Appearance: He is well-developed. He is obese.   Neck:      Vascular: Normal carotid pulses.      Trachea: " Trachea normal.   Cardiovascular:      Rate and Rhythm: Normal rate and regular rhythm.      Pulses: Normal pulses.      Heart sounds: Normal heart sounds. No murmur heard.  Pulmonary:      Effort: Pulmonary effort is normal. No respiratory distress.      Breath sounds: Normal breath sounds. No wheezing.   Musculoskeletal:         General: No swelling. Normal range of motion.   Skin:     General: Skin is warm and dry.      Coloration: Skin is not pale.   Neurological:      Mental Status: He is alert and oriented to person, place, and time.      Motor: No weakness.   Psychiatric:         Mood and Affect: Mood normal.         Behavior: Behavior normal. Behavior is cooperative.         Thought Content: Thought content normal.        Accessory Clinical Findings:   Echocardiogram:  Results for orders placed or performed during the hospital encounter of 07/28/15   ECHOCARDIOGRAM COMP W/O CONT   Result Value Ref Range    Eject.Frac. MOD BP 59.37     Eject.Frac. MOD 4C 62.42     Eject.Frac. MOD 2C 60.22       Lab Results   Component Value Date    CHOLSTRLTOT 113 02/22/2022    LDL 48 02/22/2022    HDL 27 (A) 02/22/2022    TRIGLYCERIDE 189 (H) 02/22/2022      Lab Results   Component Value Date    HBA1C 5.8 (H) 02/22/2022      Lab Results   Component Value Date    SODIUM 134 (L) 02/22/2022    POTASSIUM 4.0 02/22/2022    CHLORIDE 100 02/22/2022    CO2 22 02/22/2022    GLUCOSE 105 (H) 02/22/2022    BUN 19 02/22/2022    CREATININE 1.23 02/22/2022     Lab Results   Component Value Date    URCREAT 27.11 05/29/2021    MICROALBUR <1.2 05/29/2021    MALBCRT see below 05/29/2021        Multiple imaging studies and lab reports were available in EMR and reviewed at today's visit    TIMBO Jacques.

## 2022-04-03 ENCOUNTER — OFFICE VISIT (OUTPATIENT)
Dept: URGENT CARE | Facility: PHYSICIAN GROUP | Age: 32
End: 2022-04-03
Payer: COMMERCIAL

## 2022-04-03 VITALS
TEMPERATURE: 97.4 F | HEIGHT: 65 IN | BODY MASS INDEX: 41.99 KG/M2 | OXYGEN SATURATION: 95 % | DIASTOLIC BLOOD PRESSURE: 78 MMHG | SYSTOLIC BLOOD PRESSURE: 128 MMHG | WEIGHT: 252 LBS | HEART RATE: 77 BPM

## 2022-04-03 DIAGNOSIS — M10.9 ACUTE GOUT OF LEFT ANKLE, UNSPECIFIED CAUSE: ICD-10-CM

## 2022-04-03 PROCEDURE — 99214 OFFICE O/P EST MOD 30 MIN: CPT | Performed by: PHYSICIAN ASSISTANT

## 2022-04-03 RX ORDER — COLCHICINE 0.6 MG/1
TABLET ORAL
Qty: 10 TABLET | Refills: 0 | Status: SHIPPED | OUTPATIENT
Start: 2022-04-03 | End: 2022-05-08 | Stop reason: SDUPTHER

## 2022-04-03 RX ORDER — INDOMETHACIN 50 MG/1
50 CAPSULE ORAL 3 TIMES DAILY PRN
Qty: 30 CAPSULE | Refills: 0 | Status: SHIPPED | OUTPATIENT
Start: 2022-04-03 | End: 2022-05-08 | Stop reason: SDUPTHER

## 2022-04-03 ASSESSMENT — ENCOUNTER SYMPTOMS
SHORTNESS OF BREATH: 0
SORE THROAT: 0
EYE DISCHARGE: 0
COUGH: 0
HEADACHES: 0
FEVER: 0
NAUSEA: 0
EYE REDNESS: 0
VOMITING: 0

## 2022-04-03 NOTE — PROGRESS NOTES
Adi Gallo is a 31 y.o. male who presents with Gout (Flare up since last night )            HPI  This is a new problem.  The patient presents to clinic complaining of an acute gout flare to his left ankle.  The patient states he developed gout symptoms to the left ankle last night.  The patient states he is experiencing pain, swelling, and slight redness to the medial aspect of his left ankle. The patient reports no recent injury or trauma to his left ankle.  The patient also reports no numbness, tingling, or weakness of the left ankle/foot.  The patient notes slight increased pain to the left ankle with walking/weightbearing.  The patient has not taken any OTC medications for his current symptoms.  The patient reports a longstanding history of gout.    PMH:  has a past medical history of Cold, Diabetes (HCC), Dyslipidemia (12/2/2015), Fatty liver, Gout, High cholesterol, Hypertension (2015), Liver mass, Obese, Sleep apnea, and Snoring.    He has no past medical history of Allergy.  MEDS:   Current Outpatient Medications:   •  indomethacin (INDOCIN) 50 MG Cap, Take 1 Capsule by mouth 3 times a day as needed., Disp: 30 Capsule, Rfl: 0  •  colchicine (COLCRYS) 0.6 MG Tab, Take 1.2mg (2 tabs) PO x once. Take 0.6mg (1 tab) PO x 1 hour later if pain persists. Do not exceed 3 tabs in 24 hours. Take 0.6mg (1 tab) PO daily x 7 days., Disp: 10 Tablet, Rfl: 0  •  losartan (COZAAR) 25 MG Tab, Take 1 Tablet by mouth every day., Disp: 30 Tablet, Rfl: 6  •  amLODIPine (NORVASC) 10 MG Tab, Take 1 Tablet by mouth every day., Disp: 30 Tablet, Rfl: 6  •  metFORMIN (GLUCOPHAGE) 850 MG Tab, Take 1 Tablet by mouth 2 times a day with meals., Disp: 180 Tablet, Rfl: 3  •  carvedilol (COREG) 25 MG Tab, Take 1 Tablet by mouth 2 times a day with meals., Disp: 180 Tablet, Rfl: 3  •  atorvastatin (LIPITOR) 10 MG Tab, Take 0.5 Tablets by mouth every day., Disp: 45 Tablet, Rfl: 3  •  spironolactone (ALDACTONE) 25 MG  Tab, Take 1 tablet by mouth every day., Disp: 90 tablet, Rfl: 3  ALLERGIES: No Known Allergies  SURGHX:   Past Surgical History:   Procedure Laterality Date   • GASTROSCOPY  8/24/2018    Procedure: GASTROSCOPY - POSS BIOPSY, DILATION, POLYPECTOMY, CONTROL OF HEMORRHAGE;  Surgeon: Munir Thomas M.D.;  Location: [facility];  Service: Gastroenterology   • EGD W/ENDOSCOPIC ULTRASOUND  8/24/2018    Procedure: EGD W/ENDOSCOPIC ULTRASOUND;  Surgeon: Munir Thomas M.D.;  Location: [facility];  Service: Gastroenterology   • EGD WITH ASP/BX  8/24/2018    Procedure: EGD WITH ASP/BX - FNA;  Surgeon: Munir Thomas M.D.;  Location: [facility];  Service: Gastroenterology   • HEPATIC ABLATION LAPAROSCOPIC  5/9/2013    Performed by Shai Ding M.D. at SURGERY [facility]   • HEPATIC ABLATION LAPAROSCOPIC  11/19/2012    Performed by Shai Ding M.D. at SURGERY [facility]   • RECOVERY  11/15/2012    Performed by [facility] at SURGERY SAME DAY [facility]   • LIVER BIOPSY  11/9/12 and 11/15/12     SOCHX:  reports that he has never smoked. He has never used smokeless tobacco. He reports previous alcohol use. He reports that he does not use drugs.  FH: Family history was reviewed, no pertinent findings to report      Review of Systems   Constitutional: Negative for fever.   HENT: Positive for congestion. Negative for ear pain and sore throat.    Eyes: Negative for discharge and redness.   Respiratory: Negative for cough and shortness of breath.    Cardiovascular: Negative for chest pain and leg swelling.   Gastrointestinal: Negative for nausea and vomiting.   Musculoskeletal: Positive for joint pain (left ankle).   Skin: Negative for rash.   Neurological: Negative for headaches.              Objective     /78 (BP Location: Right arm, Patient Position: Sitting, BP Cuff Size: Adult)   Pulse 77   Temp 36.3 °C (97.4 °F) (Temporal)    "Ht 1.651 m (5' 5\")   Wt 114 kg (252 lb)   SpO2 95%   BMI 41.93 kg/m²      Physical Exam  Constitutional:       General: He is not in acute distress.     Appearance: Normal appearance. He is well-developed. He is not ill-appearing.   HENT:      Head: Normocephalic and atraumatic.      Right Ear: External ear normal.      Left Ear: External ear normal.   Eyes:      Extraocular Movements: Extraocular movements intact.      Conjunctiva/sclera: Conjunctivae normal.   Cardiovascular:      Rate and Rhythm: Normal rate.   Pulmonary:      Effort: Pulmonary effort is normal.   Musculoskeletal:      Cervical back: Normal range of motion and neck supple.      Comments:   Left Ankle:   Slight tenderness to the medial aspect of the left ankle inferior to the medial malleolus with trace swelling.  No overlying erythema.  No increased warmth.  No ecchymosis.  No open wounds/lesions.  ROM intact -patient demonstrates full active range of motion of the left ankle/foot  Neurovascular intact distally  Strength 5/5 -dorsiflexion/plantarflexion of the left ankle/foot against resistance  Normal gait   Skin:     General: Skin is warm and dry.   Neurological:      Mental Status: He is alert and oriented to person, place, and time.                             Assessment & Plan          1. Acute gout of left ankle, unspecified cause  - colchicine (COLCRYS) 0.6 MG Tab; Take 1.2mg (2 tabs) PO x once. Take 0.6mg (1 tab) PO x 1 hour later if pain persists. Do not exceed 3 tabs in 24 hours. Take 0.6mg (1 tab) PO daily x 7 days.  Dispense: 10 Tablet; Refill: 0  - indomethacin (INDOCIN) 50 MG Cap; Take 1 Capsule by mouth 3 times a day as needed.  Dispense: 30 Capsule; Refill: 0    The patient's presenting symptoms and physical exam endings are consistent with an acute gout flare of the left ankle.  The patient has a history of gout.  This is an acute exacerbation of a chronic problem.  The patient reports good improvement of his previous gout " flares with colchicine and indomethacin.  Will prescribe the patient colchicine and indomethacin at this time for his acute gout flare of the left ankle.  Advised the patient to monitor worsening signs or symptoms.  Recommend OTC medications and supportive care for symptomatic management.  Recommend patient follow-up with PCP as needed.  Discussed return precautions with the patient, and he verbalized understanding.    Differential diagnoses, supportive care, and indications for immediate follow-up discussed with patient.   Instructed to return to clinic or nearest emergency department for any change in condition, further concerns, or worsening of symptoms.    OTC Tylenol for pain/discomfort   --Advised patient not to take additional NSAIDs with the prescribed indomethacin  RICE  Weight-bearing as tolerated  Follow-up with PCP   Return to clinic or go tot he ED if symptoms worsen or fail to improve, or if the patient should develop worsening/increasing pain/tenderness, swelling, bruising, redness or warmth to the affected area, decreased ROM, numbness, tingling or weakness, difficulty walking, fever/chills, and/or any concerning symptoms.      Discussed plan with the patient, and he agrees to the above.    I personally reviewed prior external notes and test results pertinent to today's visit.  I have independently reviewed and interpreted all diagnostics ordered during this urgent care visit.     Please note that this dictation was created using voice recognition software. I have made every reasonable attempt to correct obvious errors, but I expect that there may be errors of grammar and possibly content that I did not discover before finalizing the note.     This note was electronically signed by Rachael Welch PA-C

## 2022-05-08 ENCOUNTER — OFFICE VISIT (OUTPATIENT)
Dept: URGENT CARE | Facility: PHYSICIAN GROUP | Age: 32
End: 2022-05-08
Payer: COMMERCIAL

## 2022-05-08 VITALS
SYSTOLIC BLOOD PRESSURE: 128 MMHG | OXYGEN SATURATION: 98 % | RESPIRATION RATE: 18 BRPM | HEART RATE: 77 BPM | BODY MASS INDEX: 41.65 KG/M2 | DIASTOLIC BLOOD PRESSURE: 84 MMHG | WEIGHT: 250 LBS | HEIGHT: 65 IN | TEMPERATURE: 97.7 F

## 2022-05-08 DIAGNOSIS — M10.9 ACUTE GOUT OF LEFT ANKLE, UNSPECIFIED CAUSE: ICD-10-CM

## 2022-05-08 PROCEDURE — 99213 OFFICE O/P EST LOW 20 MIN: CPT | Performed by: STUDENT IN AN ORGANIZED HEALTH CARE EDUCATION/TRAINING PROGRAM

## 2022-05-08 RX ORDER — INDOMETHACIN 50 MG/1
50 CAPSULE ORAL 3 TIMES DAILY PRN
Qty: 30 CAPSULE | Refills: 0 | Status: SHIPPED | OUTPATIENT
Start: 2022-05-08 | End: 2022-05-15

## 2022-05-08 RX ORDER — COLCHICINE 0.6 MG/1
TABLET ORAL
Qty: 10 TABLET | Refills: 0 | Status: SHIPPED | OUTPATIENT
Start: 2022-05-08 | End: 2022-06-28

## 2022-05-08 NOTE — PROGRESS NOTES
Subjective:   Medina Gallo is a 31 y.o. male who presents for Gout (Right foot,painful,swollen,x5 days)      HPI:  Pleasant 31-year-old male presents the clinic for 5 days of right foot pain and swelling x5 days.  Patient has a history of gout and states that he gets gout flares approximately once a month.  Patient states that he is trying to get established with a PCP for maintenance medication but does not take any Maintenance medication at this time.  Patient was seen just over a month ago in urgent care for another acute gout flare which was treated with indomethacin and colchicine.  Patient states that these medications were tolerated well and alleviated his symptoms.  But he started having another flare approximately 5 days ago due to being on his feet at work constantly.  Patient denies fever, chills, numbness, tingling, burning, inability to bear weight, chest pain, palpitations, lower leg swelling, lower leg pain, radiation of pain outside of the ankle, cough, sputum production, shortness of breath, nausea, vomiting, abdominal pain, diarrhea, constipation, dizziness, or headache.  Patient denies any trauma or injury to the foot.  No falls.      Medications:    • amLODIPine Tabs  • atorvastatin Tabs  • carvedilol Tabs  • colchicine Tabs  • indomethacin Caps  • losartan Tabs  • metFORMIN Tabs  • spironolactone Tabs    Allergies: Patient has no known allergies.    Problem List: Medina Gallo does not have any pertinent problems on file.    Surgical History:  Past Surgical History:   Procedure Laterality Date   • GASTROSCOPY  8/24/2018    Procedure: GASTROSCOPY - POSS BIOPSY, DILATION, POLYPECTOMY, CONTROL OF HEMORRHAGE;  Surgeon: Munir Thomas M.D.;  Location: [facility];  Service: Gastroenterology   • EGD W/ENDOSCOPIC ULTRASOUND  8/24/2018    Procedure: EGD W/ENDOSCOPIC ULTRASOUND;  Surgeon: Munir Thomas M.D.;  Location: [facility];  Service:  "Gastroenterology   • EGD WITH ASP/BX  8/24/2018    Procedure: EGD WITH ASP/BX - FNA;  Surgeon: Munir Thomas M.D.;  Location: SURGERY [facility];  Service: Gastroenterology   • HEPATIC ABLATION LAPAROSCOPIC  5/9/2013    Performed by Shai Ding M.D. at SURGERY [facility]   • HEPATIC ABLATION LAPAROSCOPIC  11/19/2012    Performed by Shai Ding M.D. at SURGERY [facility]   • RECOVERY  11/15/2012    Performed by [facility] at SURGERY [facility] DAY [facility]   • LIVER BIOPSY  11/9/12 and 11/15/12       Past Social Hx: Medina Gallo  reports that he has never smoked. He has never used smokeless tobacco. He reports previous alcohol use. He reports that he does not use drugs.     Past Family Hx:  Medina Gallo family history includes Heart Disease in his father and sister; Hypertension in his father, mother, and sister.     Problem list, medications, and allergies reviewed by myself today in Epic.     Objective:     /84 (BP Location: Left arm, Patient Position: Sitting, BP Cuff Size: Adult)   Pulse 77   Temp 36.5 °C (97.7 °F) (Temporal)   Resp 18   Ht 1.651 m (5' 5\")   Wt 113 kg (250 lb)   SpO2 98%   BMI 41.60 kg/m²     Physical Exam  Vitals reviewed.   Cardiovascular:      Rate and Rhythm: Normal rate and regular rhythm.      Pulses: Normal pulses.      Heart sounds: Normal heart sounds. No murmur heard.  Pulmonary:      Effort: Pulmonary effort is normal. No respiratory distress.      Breath sounds: Normal breath sounds. No stridor. No wheezing, rhonchi or rales.   Feet:      Comments: Right foot: Patient has full active and passive range of motion.  5 out of 5 strength during plantarflexion, dorsiflexion, eversion, and inversion.  No erythema, ecchymosis, increased warmth, laceration, bony deformity, crepitus.  Cap refill less than 2 seconds.  2+ pedal pulse.  Patient has tenderness to palpation over the medial malleolus lateral " malleolus and top of the foot.  Mild swelling diffusely throughout the ankle.  Skin:     General: Skin is warm and dry.      Capillary Refill: Capillary refill takes less than 2 seconds.      Findings: No erythema, lesion or rash.   Neurological:      General: No focal deficit present.      Mental Status: He is oriented to person, place, and time.         Assessment/Plan:     Diagnosis and associated orders:     1. Acute gout of left ankle, unspecified cause  indomethacin (INDOCIN) 50 MG Cap    colchicine (COLCRYS) 0.6 MG Tab      Comments/MDM:     • Patient's presentation physical exam findings are consistent with acute gout flare.  This is a chronic condition with acute exacerbation.  Patient states that he has been getting gout flares approximately once a month for the past few months.  Patient was treated previously with colchicine and indomethacin and states that he tolerated both these medications very well and they helped alleviate his acute flares.  • Patient was given new prescription of indomethacin and colchicine educated on the use this medication and the possible side effects.  Patient was educated on possible interaction with his other medications including carvedilol.  Patient is aware this and understands the risks.  Patient is agreeable this plan and would like these medications to be prescribed.  • Patient may also ice the foot approximately 5 times a day for 15 to 20 minutes at a time to help reduce inflammation and discomfort.  • Discussed with patient that he should continue to follow through with establishing with a PCP for possible maintenance medication to reduce the number of flares that he has.  • ED precautions were given.  Patient understands the signs and symptoms of warrant immediate reevaluation.         Differential diagnosis, natural history, supportive care, and indications for immediate follow-up discussed.    Advised the patient to follow-up with the primary care physician for  recheck, reevaluation, and consideration of further management.    Please note that this dictation was created using voice recognition software. I have made a reasonable attempt to correct obvious errors, but I expect that there are errors of grammar and possibly content that I did not discover before finalizing the note.    Electronically signed by Jose Luis Munoz PA-C.

## 2022-06-09 ENCOUNTER — TELEPHONE (OUTPATIENT)
Dept: SCHEDULING | Facility: IMAGING CENTER | Age: 32
End: 2022-06-09

## 2022-06-15 ENCOUNTER — OFFICE VISIT (OUTPATIENT)
Dept: URGENT CARE | Facility: CLINIC | Age: 32
End: 2022-06-15
Payer: COMMERCIAL

## 2022-06-15 VITALS
RESPIRATION RATE: 18 BRPM | TEMPERATURE: 97.3 F | WEIGHT: 243 LBS | BODY MASS INDEX: 40.48 KG/M2 | HEIGHT: 65 IN | OXYGEN SATURATION: 95 % | DIASTOLIC BLOOD PRESSURE: 88 MMHG | SYSTOLIC BLOOD PRESSURE: 126 MMHG | HEART RATE: 90 BPM

## 2022-06-15 DIAGNOSIS — M10.9 ACUTE GOUT OF LEFT FOOT, UNSPECIFIED CAUSE: ICD-10-CM

## 2022-06-15 PROCEDURE — 99213 OFFICE O/P EST LOW 20 MIN: CPT | Performed by: PHYSICIAN ASSISTANT

## 2022-06-15 RX ORDER — INDOMETHACIN 50 MG/1
50 CAPSULE ORAL 3 TIMES DAILY
Qty: 90 CAPSULE | Refills: 0 | Status: SHIPPED | OUTPATIENT
Start: 2022-06-15

## 2022-06-16 DIAGNOSIS — I10 ESSENTIAL HYPERTENSION: ICD-10-CM

## 2022-06-16 RX ORDER — SPIRONOLACTONE 25 MG/1
25 TABLET ORAL DAILY
Qty: 90 TABLET | Refills: 0 | Status: SHIPPED | OUTPATIENT
Start: 2022-06-16 | End: 2022-09-12

## 2022-06-16 ASSESSMENT — ENCOUNTER SYMPTOMS
NAUSEA: 0
CHILLS: 0
VOMITING: 0
TINGLING: 0
SENSORY CHANGE: 0
FEVER: 0

## 2022-06-16 NOTE — PROGRESS NOTES
Subjective     Medina Gallo is a 31 y.o. male who presents with Gout (X was taking medication from PCP, ran out and still having flare up )    HPI:  Medina Gallo is a 31 y.o. male who presents today for evaluation of gout.  Patient is requesting a refill on his indomethacin.  States that it has been keeping his current gout flareup at bay but he ran out and does not believe he will be able to function at work without the anti-inflammatory.  He is currently having pain in his left small toe and arch of his left foot.  He has had pain in this area before with gout flareups.  He does have an appointment scheduled with a new primary care provider on June 28.  Denies any injury.  No numbness or tingling or fever/chills.      Review of Systems   Constitutional: Negative for chills and fever.   Gastrointestinal: Negative for nausea and vomiting.   Musculoskeletal:        Left foot pain   Neurological: Negative for tingling and sensory change.           PMH:  has a past medical history of Cold, Diabetes (HCC), Dyslipidemia (12/2/2015), Fatty liver, Gout, High cholesterol, Hypertension (2015), Liver mass, Obese, Sleep apnea, and Snoring.    He has no past medical history of Allergy.  MEDS:   Current Outpatient Medications:   •  indomethacin (INDOCIN) 50 MG Cap, Take 1 Capsule by mouth 3 times a day., Disp: 90 Capsule, Rfl: 0  •  losartan (COZAAR) 25 MG Tab, Take 1 Tablet by mouth every day., Disp: 30 Tablet, Rfl: 6  •  amLODIPine (NORVASC) 10 MG Tab, Take 1 Tablet by mouth every day., Disp: 30 Tablet, Rfl: 6  •  metFORMIN (GLUCOPHAGE) 850 MG Tab, Take 1 Tablet by mouth 2 times a day with meals., Disp: 180 Tablet, Rfl: 3  •  carvedilol (COREG) 25 MG Tab, Take 1 Tablet by mouth 2 times a day with meals., Disp: 180 Tablet, Rfl: 3  •  atorvastatin (LIPITOR) 10 MG Tab, Take 0.5 Tablets by mouth every day., Disp: 45 Tablet, Rfl: 3  •  spironolactone (ALDACTONE) 25 MG Tab, TAKE 1 TABLET BY MOUTH EVERY DAY,  "Disp: 90 Tablet, Rfl: 0  •  colchicine (COLCRYS) 0.6 MG Tab, Take 1.2mg (2 tabs) PO x once. Take 0.6mg (1 tab) PO x 1 hour later if pain persists. Do not exceed 3 tabs in 24 hours. Take 0.6mg (1 tab) PO daily x 7 days. (Patient not taking: Reported on 6/15/2022), Disp: 10 Tablet, Rfl: 0  ALLERGIES: No Known Allergies  SURGHX:   Past Surgical History:   Procedure Laterality Date   • GASTROSCOPY  8/24/2018    Procedure: GASTROSCOPY - POSS BIOPSY, DILATION, POLYPECTOMY, CONTROL OF HEMORRHAGE;  Surgeon: Munir Thomas M.D.;  Location: SURGERY [facility];  Service: Gastroenterology   • EGD W/ENDOSCOPIC ULTRASOUND  8/24/2018    Procedure: EGD W/ENDOSCOPIC ULTRASOUND;  Surgeon: Munir Thomas M.D.;  Location: [facility];  Service: Gastroenterology   • EGD WITH ASP/BX  8/24/2018    Procedure: EGD WITH ASP/BX - FNA;  Surgeon: Munir Thomas M.D.;  Location: [facility];  Service: Gastroenterology   • HEPATIC ABLATION LAPAROSCOPIC  5/9/2013    Performed by Shai Ding M.D. at SURGERY [facility]   • HEPATIC ABLATION LAPAROSCOPIC  11/19/2012    Performed by Shai Ding M.D. at SURGERY [facility]   • RECOVERY  11/15/2012    Performed by [facility] at SURGERY SAME DAY [facility]   • LIVER BIOPSY  11/9/12 and 11/15/12     SOCHX:  reports that he has never smoked. He has never used smokeless tobacco. He reports previous alcohol use. He reports that he does not use drugs.  FH: Family history was reviewed, no pertinent findings to report      Objective     /88 (BP Location: Left arm, Patient Position: Sitting, BP Cuff Size: Large adult)   Pulse 90   Temp 36.3 °C (97.3 °F) (Temporal)   Resp 18   Ht 1.651 m (5' 5\")   Wt 110 kg (243 lb)   SpO2 95%   BMI 40.44 kg/m²      Physical Exam  Constitutional:       General: He is not in acute distress.     Appearance: He is not diaphoretic.   HENT:      Head: Normocephalic and atraumatic.     "  Right Ear: External ear normal.      Left Ear: External ear normal.   Eyes:      Conjunctiva/sclera: Conjunctivae normal.      Pupils: Pupils are equal, round, and reactive to light.   Pulmonary:      Effort: Pulmonary effort is normal. No respiratory distress.   Musculoskeletal:      Cervical back: Normal range of motion.   Feet:      Comments: Mild tenderness on plantar aspects of left foot as well as the lateral aspects of the left foot overlying the distal aspect of the fifth metatarsal  Skin:     Findings: No rash.   Neurological:      Mental Status: He is alert and oriented to person, place, and time.   Psychiatric:         Mood and Affect: Mood and affect normal.         Cognition and Memory: Memory normal.         Judgment: Judgment normal.                   Assessment & Plan       1. Acute gout of left foot, unspecified cause  - indomethacin (INDOCIN) 50 MG Cap; Take 1 Capsule by mouth 3 times a day.  Dispense: 90 Capsule; Refill: 0  - Apply ice multiple times per day  - Keep elevated as much as possible  - Follow up with PCP on 6/28 as scheduled            Differential Diagnosis, natural history, and supportive care discussed. Return to the Urgent Care or follow up with your PCP if symptoms fail to resolve, or for any new or worsening symptoms. Emergency room precautions discussed. Patient and/or family appears understanding of information.

## 2022-06-28 ENCOUNTER — OFFICE VISIT (OUTPATIENT)
Dept: MEDICAL GROUP | Facility: PHYSICIAN GROUP | Age: 32
End: 2022-06-28
Payer: COMMERCIAL

## 2022-06-28 VITALS
BODY MASS INDEX: 40.98 KG/M2 | RESPIRATION RATE: 16 BRPM | OXYGEN SATURATION: 95 % | SYSTOLIC BLOOD PRESSURE: 140 MMHG | HEIGHT: 65 IN | WEIGHT: 246 LBS | DIASTOLIC BLOOD PRESSURE: 72 MMHG | HEART RATE: 86 BPM | TEMPERATURE: 98.5 F

## 2022-06-28 DIAGNOSIS — M1A.0790 CHRONIC IDIOPATHIC GOUT INVOLVING TOE WITHOUT TOPHUS, UNSPECIFIED LATERALITY: ICD-10-CM

## 2022-06-28 DIAGNOSIS — Z23 NEED FOR VACCINATION: ICD-10-CM

## 2022-06-28 DIAGNOSIS — I10 ESSENTIAL HYPERTENSION: ICD-10-CM

## 2022-06-28 DIAGNOSIS — E78.5 DYSLIPIDEMIA: ICD-10-CM

## 2022-06-28 DIAGNOSIS — R73.03 PREDIABETES: ICD-10-CM

## 2022-06-28 PROCEDURE — 90471 IMMUNIZATION ADMIN: CPT | Performed by: STUDENT IN AN ORGANIZED HEALTH CARE EDUCATION/TRAINING PROGRAM

## 2022-06-28 PROCEDURE — 99214 OFFICE O/P EST MOD 30 MIN: CPT | Mod: 25 | Performed by: STUDENT IN AN ORGANIZED HEALTH CARE EDUCATION/TRAINING PROGRAM

## 2022-06-28 PROCEDURE — 90732 PPSV23 VACC 2 YRS+ SUBQ/IM: CPT | Performed by: STUDENT IN AN ORGANIZED HEALTH CARE EDUCATION/TRAINING PROGRAM

## 2022-06-28 RX ORDER — COLCHICINE 0.6 MG/1
0.6 TABLET ORAL DAILY
Qty: 30 TABLET | Refills: 2 | Status: SHIPPED | OUTPATIENT
Start: 2022-06-28 | End: 2023-06-08 | Stop reason: SDUPTHER

## 2022-06-28 RX ORDER — ALLOPURINOL 100 MG/1
100 TABLET ORAL DAILY
Qty: 90 TABLET | Refills: 3 | Status: SHIPPED | OUTPATIENT
Start: 2022-06-28 | End: 2023-06-08 | Stop reason: SDUPTHER

## 2022-06-28 RX ORDER — CARVEDILOL 25 MG/1
25 TABLET ORAL 2 TIMES DAILY WITH MEALS
Qty: 180 TABLET | Refills: 3 | Status: SHIPPED | OUTPATIENT
Start: 2022-06-28 | End: 2023-06-08 | Stop reason: SDUPTHER

## 2022-06-28 SDOH — HEALTH STABILITY: PHYSICAL HEALTH: ON AVERAGE, HOW MANY DAYS PER WEEK DO YOU ENGAGE IN MODERATE TO STRENUOUS EXERCISE (LIKE A BRISK WALK)?: 1 DAY

## 2022-06-28 SDOH — ECONOMIC STABILITY: INCOME INSECURITY: IN THE LAST 12 MONTHS, WAS THERE A TIME WHEN YOU WERE NOT ABLE TO PAY THE MORTGAGE OR RENT ON TIME?: NO

## 2022-06-28 SDOH — ECONOMIC STABILITY: TRANSPORTATION INSECURITY
IN THE PAST 12 MONTHS, HAS LACK OF TRANSPORTATION KEPT YOU FROM MEETINGS, WORK, OR FROM GETTING THINGS NEEDED FOR DAILY LIVING?: NO

## 2022-06-28 SDOH — ECONOMIC STABILITY: HOUSING INSECURITY
IN THE LAST 12 MONTHS, WAS THERE A TIME WHEN YOU DID NOT HAVE A STEADY PLACE TO SLEEP OR SLEPT IN A SHELTER (INCLUDING NOW)?: NO

## 2022-06-28 SDOH — ECONOMIC STABILITY: TRANSPORTATION INSECURITY
IN THE PAST 12 MONTHS, HAS THE LACK OF TRANSPORTATION KEPT YOU FROM MEDICAL APPOINTMENTS OR FROM GETTING MEDICATIONS?: NO

## 2022-06-28 SDOH — ECONOMIC STABILITY: INCOME INSECURITY: HOW HARD IS IT FOR YOU TO PAY FOR THE VERY BASICS LIKE FOOD, HOUSING, MEDICAL CARE, AND HEATING?: NOT VERY HARD

## 2022-06-28 SDOH — ECONOMIC STABILITY: FOOD INSECURITY: WITHIN THE PAST 12 MONTHS, THE FOOD YOU BOUGHT JUST DIDN'T LAST AND YOU DIDN'T HAVE MONEY TO GET MORE.: NEVER TRUE

## 2022-06-28 SDOH — HEALTH STABILITY: PHYSICAL HEALTH: ON AVERAGE, HOW MANY MINUTES DO YOU ENGAGE IN EXERCISE AT THIS LEVEL?: 50 MIN

## 2022-06-28 SDOH — ECONOMIC STABILITY: TRANSPORTATION INSECURITY
IN THE PAST 12 MONTHS, HAS LACK OF RELIABLE TRANSPORTATION KEPT YOU FROM MEDICAL APPOINTMENTS, MEETINGS, WORK OR FROM GETTING THINGS NEEDED FOR DAILY LIVING?: NO

## 2022-06-28 SDOH — ECONOMIC STABILITY: FOOD INSECURITY: WITHIN THE PAST 12 MONTHS, YOU WORRIED THAT YOUR FOOD WOULD RUN OUT BEFORE YOU GOT MONEY TO BUY MORE.: NEVER TRUE

## 2022-06-28 SDOH — ECONOMIC STABILITY: HOUSING INSECURITY: IN THE LAST 12 MONTHS, HOW MANY PLACES HAVE YOU LIVED?: 1

## 2022-06-28 SDOH — HEALTH STABILITY: MENTAL HEALTH
STRESS IS WHEN SOMEONE FEELS TENSE, NERVOUS, ANXIOUS, OR CAN'T SLEEP AT NIGHT BECAUSE THEIR MIND IS TROUBLED. HOW STRESSED ARE YOU?: ONLY A LITTLE

## 2022-06-28 ASSESSMENT — SOCIAL DETERMINANTS OF HEALTH (SDOH)
HOW OFTEN DO YOU ATTEND CHURCH OR RELIGIOUS SERVICES?: 1 TO 4 TIMES PER YEAR
HOW OFTEN DO YOU GET TOGETHER WITH FRIENDS OR RELATIVES?: ONCE A WEEK
DO YOU BELONG TO ANY CLUBS OR ORGANIZATIONS SUCH AS CHURCH GROUPS UNIONS, FRATERNAL OR ATHLETIC GROUPS, OR SCHOOL GROUPS?: NO
ARE YOU MARRIED, WIDOWED, DIVORCED, SEPARATED, NEVER MARRIED, OR LIVING WITH A PARTNER?: NEVER MARRIED
HOW OFTEN DO YOU HAVE SIX OR MORE DRINKS ON ONE OCCASION: NEVER
IN A TYPICAL WEEK, HOW MANY TIMES DO YOU TALK ON THE PHONE WITH FAMILY, FRIENDS, OR NEIGHBORS?: MORE THAN THREE TIMES A WEEK
WITHIN THE PAST 12 MONTHS, YOU WORRIED THAT YOUR FOOD WOULD RUN OUT BEFORE YOU GOT THE MONEY TO BUY MORE: NEVER TRUE
HOW MANY DRINKS CONTAINING ALCOHOL DO YOU HAVE ON A TYPICAL DAY WHEN YOU ARE DRINKING: 1 OR 2
HOW HARD IS IT FOR YOU TO PAY FOR THE VERY BASICS LIKE FOOD, HOUSING, MEDICAL CARE, AND HEATING?: NOT VERY HARD
HOW OFTEN DO YOU ATTENT MEETINGS OF THE CLUB OR ORGANIZATION YOU BELONG TO?: NEVER
DO YOU BELONG TO ANY CLUBS OR ORGANIZATIONS SUCH AS CHURCH GROUPS UNIONS, FRATERNAL OR ATHLETIC GROUPS, OR SCHOOL GROUPS?: NO
HOW OFTEN DO YOU ATTEND CHURCH OR RELIGIOUS SERVICES?: 1 TO 4 TIMES PER YEAR
IN A TYPICAL WEEK, HOW MANY TIMES DO YOU TALK ON THE PHONE WITH FAMILY, FRIENDS, OR NEIGHBORS?: MORE THAN THREE TIMES A WEEK
HOW OFTEN DO YOU HAVE A DRINK CONTAINING ALCOHOL: MONTHLY OR LESS
HOW OFTEN DO YOU GET TOGETHER WITH FRIENDS OR RELATIVES?: ONCE A WEEK
HOW OFTEN DO YOU ATTENT MEETINGS OF THE CLUB OR ORGANIZATION YOU BELONG TO?: NEVER
ARE YOU MARRIED, WIDOWED, DIVORCED, SEPARATED, NEVER MARRIED, OR LIVING WITH A PARTNER?: NEVER MARRIED

## 2022-06-28 ASSESSMENT — PATIENT HEALTH QUESTIONNAIRE - PHQ9: CLINICAL INTERPRETATION OF PHQ2 SCORE: 0

## 2022-06-28 ASSESSMENT — LIFESTYLE VARIABLES
HOW OFTEN DO YOU HAVE SIX OR MORE DRINKS ON ONE OCCASION: NEVER
HOW MANY STANDARD DRINKS CONTAINING ALCOHOL DO YOU HAVE ON A TYPICAL DAY: 1 OR 2
HOW OFTEN DO YOU HAVE A DRINK CONTAINING ALCOHOL: MONTHLY OR LESS
SKIP TO QUESTIONS 9-10: 1
AUDIT-C TOTAL SCORE: 1

## 2022-06-28 NOTE — PROGRESS NOTES
Subjective:     CC:  Diagnoses of BMI 40.0-44.9, adult (HCC), Essential hypertension, Prediabetes, Need for vaccination, Chronic idiopathic gout involving toe without tophus, unspecified laterality, and Dyslipidemia were pertinent to this visit.    HISTORY OF THE PRESENT ILLNESS: Patient is a 31 y.o. male. This pleasant patient is here today to establish care and discuss gout treatment. His prior PCP was Dr. Avel JORDAN.    1.  Diagnosis of BMI 40.0-44.9, adult (HCC)  2.  Prediabetes  Patient uses metformin 850 mg twice daily with meals.  He has not been exercising recently although has been successful with exercise regimens in the past.    3.  Essential hypertension  Longstanding.  Patient is fully compliant with his amlodipine 10 mg daily, carvedilol 25 mg twice daily, losartan 25 mg daily, spironolactone 25 mg daily.  He reports no side effect to treatment.    4.  Chronic idiopathic gout without tophus  Patient has a longstanding history of gout however has had increased frequency of attacks over the last 6 months with 6 total attacks.  He has been treating with indomethacin 50 mg 3 times daily as needed.    5.  Dyslipidemia.  Patient is consistent with his atorvastatin 5 mg daily.  He reports no side effects to treatment.    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 30 Tablet 2   • allopurinol (ZYLOPRIM) 100 MG Tab Take 1 Tablet by mouth every day. 90  "Tablet 3   • carvedilol (COREG) 25 MG Tab Take 1 Tablet by mouth 2 times a day with meals. 180 Tablet 3   • spironolactone (ALDACTONE) 25 MG Tab TAKE 1 TABLET BY MOUTH EVERY DAY 90 Tablet 0   • indomethacin (INDOCIN) 50 MG Cap Take 1 Capsule by mouth 3 times a day. 90 Capsule 0   • losartan (COZAAR) 25 MG Tab Take 1 Tablet by mouth every day. 30 Tablet 6   • amLODIPine (NORVASC) 10 MG Tab Take 1 Tablet by mouth every day. 30 Tablet 6   • metFORMIN (GLUCOPHAGE) 850 MG Tab Take 1 Tablet by mouth 2 times a day with meals. 180 Tablet 3   • atorvastatin (LIPITOR) 10 MG Tab Take 0.5 Tablets by mouth every day. 45 Tablet 3     No current Epic-ordered facility-administered medications on file.     ROS:   Gen: No fevers/chills, no changes in weight  HEENT: No changes in vision/hearing, sore throat.  Pulm: No cough, unexplained SOB.  CV: No chest pain/pressure, no palpitations  GI: No nausea/vomiting, no diarrhea  : No dysuria/nocturia  MSk: No myalgias.  See HPI.  Skin: No rash/skin changes  Neuro: No headaches, no numbness/tingling  Heme/Lymph: no easy bruising      Objective:     Exam: BP (!) 140/72 (BP Location: Left arm, Patient Position: Sitting, BP Cuff Size: Large adult)   Pulse 86   Temp 36.9 °C (98.5 °F) (Temporal)   Resp 16   Ht 1.651 m (5' 5\")   Wt 112 kg (246 lb)   SpO2 95%  Body mass index is 40.94 kg/m².    General: Normal appearing. No distress.  HEENT: Normocephalic. Eyes conjunctiva clear lids without ptosis, pupils equal and reactive to light accommodation. Ears normal shape and contour, canals are clear bilaterally, tympanic membranes are benign, nasal mucosa benign, oropharynx is without erythema, edema or exudates.   Neck: Supple without JVD. Thyroid is not enlarged.  Pulmonary: Clear to ausculation.  Normal effort. No rales, ronchi, or wheezing.  Cardiovascular: Regular rate and rhythm without murmur. Radial pulses are intact and equal bilaterally.  Abdomen: Obese.    Neurologic: Grossly " nonfocal.  CN II through XII intact.  Lymph: No cervical or supraclavicular lymph nodes are palpable  Skin: Warm and dry.  No obvious lesions.  Musculoskeletal: Normal gait. No extremity cyanosis, clubbing, or edema.  No tophus identified.  Psych: Normal mood and affect. Alert and oriented x3. Judgment and insight are normal.    A chaperone was offered to the patient during today's exam. Patient declined chaperone.    Labs:   2/22/2022:  -BMP showing sodium 134, glucose 105  -Glycohemoglobin 5.8  -Lipid profile showing total cholesterol 113, triglycerides 189, HDL 27, LDL 48    Assessment & Plan:   31 y.o. male with the following -    1.  Diagnosis of BMI 40.0-44.9, adult ([facility])  2.  Prediabetes  -Chronic, stable.  -We discussed the need to return to excellent diet and good exercise.  -Continue metformin 850 mg twice daily.    3.  Essential hypertension  -Chronic, stable.    -No side effect reported.  -Continue amlodipine 5 mg daily  -Continue carvedilol 25 mg twice daily.  Dispense 180.  Refill 3.  -Continue losartan 25 mg daily  -Continue spironolactone 25 mg daily.    4.  Chronic idiopathic gout without tophus  -Chronic, in exacerbation.  -Start allopurinol 100 mg daily.  Dispense 30.  Refill 2.  -Start colchicine 0.6 mg daily.  Dispense 30.  Refill 2.  -Continue indomethacin 50 mg 3 times daily as needed.  -Uric acid level on or after 7/28/2022.    5.  Dyslipidemia  -Chronic, stable.  -Continue atorvastatin 5 mg daily.    6.  Encounter to establish care  -  We discussed diet/exercise/healthy weight maintenance.  We discussed the need to always wear seatbelt.  -PPSV23 vaccine provided in clinic today.  -Patient was encouraged to follow-up with dentistry.      Return in about 1 year (around 6/28/2023).    Please note that this dictation was created using voice recognition software. I have made every reasonable attempt to correct obvious errors, but I expect that there are errors of grammar and possibly content that  I did not discover before finalizing the note.      Munir Sun PA-C 6/28/2022

## 2022-08-25 ENCOUNTER — APPOINTMENT (OUTPATIENT)
Dept: VASCULAR LAB | Facility: MEDICAL CENTER | Age: 32
End: 2022-08-25
Payer: COMMERCIAL

## 2022-10-07 DIAGNOSIS — I10 ESSENTIAL HYPERTENSION: ICD-10-CM

## 2022-10-07 RX ORDER — LOSARTAN POTASSIUM 25 MG/1
25 TABLET ORAL DAILY
Qty: 30 TABLET | Refills: 0 | Status: SHIPPED | OUTPATIENT
Start: 2022-10-07 | End: 2022-11-15

## 2022-12-18 DIAGNOSIS — I10 ESSENTIAL HYPERTENSION: ICD-10-CM

## 2022-12-19 RX ORDER — LOSARTAN POTASSIUM 25 MG/1
TABLET ORAL
Qty: 30 TABLET | Refills: 0 | Status: SHIPPED | OUTPATIENT
Start: 2022-12-19 | End: 2023-06-24 | Stop reason: DRUGHIGH

## 2023-01-06 ENCOUNTER — HOSPITAL ENCOUNTER (OUTPATIENT)
Dept: LAB | Facility: MEDICAL CENTER | Age: 33
End: 2023-01-06
Attending: NURSE PRACTITIONER
Payer: COMMERCIAL

## 2023-01-06 DIAGNOSIS — E78.5 DYSLIPIDEMIA: ICD-10-CM

## 2023-01-06 DIAGNOSIS — I10 ESSENTIAL HYPERTENSION: ICD-10-CM

## 2023-01-06 DIAGNOSIS — E11.9 TYPE 2 DIABETES MELLITUS WITHOUT COMPLICATION, WITHOUT LONG-TERM CURRENT USE OF INSULIN (HCC): ICD-10-CM

## 2023-01-06 LAB
ALBUMIN SERPL BCP-MCNC: 4.5 G/DL (ref 3.2–4.9)
ALBUMIN/GLOB SERPL: 1.4 G/DL
ALP SERPL-CCNC: 56 U/L (ref 30–99)
ALT SERPL-CCNC: 26 U/L (ref 2–50)
ANION GAP SERPL CALC-SCNC: 12 MMOL/L (ref 7–16)
AST SERPL-CCNC: 17 U/L (ref 12–45)
BILIRUB SERPL-MCNC: 0.7 MG/DL (ref 0.1–1.5)
BUN SERPL-MCNC: 23 MG/DL (ref 8–22)
CALCIUM ALBUM COR SERPL-MCNC: 9.2 MG/DL (ref 8.5–10.5)
CALCIUM SERPL-MCNC: 9.6 MG/DL (ref 8.5–10.5)
CHLORIDE SERPL-SCNC: 99 MMOL/L (ref 96–112)
CHOLEST SERPL-MCNC: 96 MG/DL (ref 100–199)
CO2 SERPL-SCNC: 22 MMOL/L (ref 20–33)
CREAT SERPL-MCNC: 1.27 MG/DL (ref 0.5–1.4)
EST. AVERAGE GLUCOSE BLD GHB EST-MCNC: 111 MG/DL
GFR SERPLBLD CREATININE-BSD FMLA CKD-EPI: 76 ML/MIN/1.73 M 2
GLOBULIN SER CALC-MCNC: 3.3 G/DL (ref 1.9–3.5)
GLUCOSE SERPL-MCNC: 96 MG/DL (ref 65–99)
HBA1C MFR BLD: 5.5 % (ref 4–5.6)
HDLC SERPL-MCNC: 31 MG/DL
LDLC SERPL CALC-MCNC: 33 MG/DL
POTASSIUM SERPL-SCNC: 4.2 MMOL/L (ref 3.6–5.5)
PROT SERPL-MCNC: 7.8 G/DL (ref 6–8.2)
SODIUM SERPL-SCNC: 133 MMOL/L (ref 135–145)
TRIGL SERPL-MCNC: 158 MG/DL (ref 0–149)

## 2023-01-06 PROCEDURE — 80053 COMPREHEN METABOLIC PANEL: CPT

## 2023-01-06 PROCEDURE — 83036 HEMOGLOBIN GLYCOSYLATED A1C: CPT

## 2023-01-06 PROCEDURE — 80061 LIPID PANEL: CPT

## 2023-01-06 PROCEDURE — 36415 COLL VENOUS BLD VENIPUNCTURE: CPT

## 2023-04-06 DIAGNOSIS — I10 ESSENTIAL HYPERTENSION: ICD-10-CM

## 2023-04-07 RX ORDER — SPIRONOLACTONE 25 MG/1
25 TABLET ORAL DAILY
Qty: 30 TABLET | Refills: 0 | OUTPATIENT
Start: 2023-04-07

## 2023-06-08 DIAGNOSIS — I10 ESSENTIAL HYPERTENSION: ICD-10-CM

## 2023-06-08 RX ORDER — CARVEDILOL 25 MG/1
25 TABLET ORAL 2 TIMES DAILY WITH MEALS
Qty: 180 TABLET | Refills: 3 | Status: SHIPPED | OUTPATIENT
Start: 2023-06-08

## 2023-06-08 RX ORDER — ALLOPURINOL 100 MG/1
100 TABLET ORAL DAILY
Qty: 90 TABLET | Refills: 3 | Status: SHIPPED | OUTPATIENT
Start: 2023-06-08

## 2023-06-08 RX ORDER — COLCHICINE 0.6 MG/1
0.6 TABLET ORAL DAILY
Qty: 30 TABLET | Refills: 2 | Status: SHIPPED | OUTPATIENT
Start: 2023-06-08

## 2023-06-23 ENCOUNTER — HOSPITAL ENCOUNTER (OUTPATIENT)
Dept: LAB | Facility: MEDICAL CENTER | Age: 33
End: 2023-06-23
Attending: STUDENT IN AN ORGANIZED HEALTH CARE EDUCATION/TRAINING PROGRAM
Payer: COMMERCIAL

## 2023-06-23 DIAGNOSIS — R73.03 PREDIABETES: ICD-10-CM

## 2023-06-23 DIAGNOSIS — M1A.0790 CHRONIC IDIOPATHIC GOUT INVOLVING TOE WITHOUT TOPHUS, UNSPECIFIED LATERALITY: ICD-10-CM

## 2023-06-23 LAB — URATE SERPL-MCNC: 10.6 MG/DL (ref 2.5–8.3)

## 2023-06-23 PROCEDURE — 82570 ASSAY OF URINE CREATININE: CPT

## 2023-06-23 PROCEDURE — 82043 UR ALBUMIN QUANTITATIVE: CPT

## 2023-06-23 PROCEDURE — 84550 ASSAY OF BLOOD/URIC ACID: CPT

## 2023-06-23 PROCEDURE — 36415 COLL VENOUS BLD VENIPUNCTURE: CPT

## 2023-06-24 DIAGNOSIS — I10 ESSENTIAL HYPERTENSION: ICD-10-CM

## 2023-06-24 LAB
CREAT UR-MCNC: 124.7 MG/DL
MICROALBUMIN UR-MCNC: 41.6 MG/DL
MICROALBUMIN/CREAT UR: 334 MG/G (ref 0–30)

## 2023-06-24 RX ORDER — LOSARTAN POTASSIUM 50 MG/1
50 TABLET ORAL DAILY
Qty: 30 TABLET | Refills: 2 | Status: SHIPPED | OUTPATIENT
Start: 2023-06-24 | End: 2023-10-04

## 2023-08-11 ENCOUNTER — DOCUMENTATION (OUTPATIENT)
Dept: HEALTH INFORMATION MANAGEMENT | Facility: OTHER | Age: 33
End: 2023-08-11
Payer: COMMERCIAL

## 2023-11-02 ENCOUNTER — OFFICE VISIT (OUTPATIENT)
Dept: VASCULAR LAB | Facility: MEDICAL CENTER | Age: 33
End: 2023-11-02
Payer: COMMERCIAL

## 2023-11-02 VITALS
SYSTOLIC BLOOD PRESSURE: 169 MMHG | HEIGHT: 65 IN | HEART RATE: 52 BPM | WEIGHT: 252 LBS | DIASTOLIC BLOOD PRESSURE: 107 MMHG | BODY MASS INDEX: 41.99 KG/M2

## 2023-11-02 DIAGNOSIS — I10 ESSENTIAL HYPERTENSION: ICD-10-CM

## 2023-11-02 DIAGNOSIS — E11.9 TYPE 2 DIABETES MELLITUS WITHOUT COMPLICATION, WITHOUT LONG-TERM CURRENT USE OF INSULIN (HCC): ICD-10-CM

## 2023-11-02 DIAGNOSIS — E78.5 DYSLIPIDEMIA: ICD-10-CM

## 2023-11-02 DIAGNOSIS — R73.03 PREDIABETES: ICD-10-CM

## 2023-11-02 PROCEDURE — 99212 OFFICE O/P EST SF 10 MIN: CPT

## 2023-11-02 PROCEDURE — 99214 OFFICE O/P EST MOD 30 MIN: CPT

## 2023-11-02 PROCEDURE — 3077F SYST BP >= 140 MM HG: CPT

## 2023-11-02 PROCEDURE — 3080F DIAST BP >= 90 MM HG: CPT

## 2023-11-02 RX ORDER — ATORVASTATIN CALCIUM 10 MG/1
5 TABLET, FILM COATED ORAL DAILY
Qty: 45 TABLET | Refills: 1 | Status: SHIPPED | OUTPATIENT
Start: 2023-11-02

## 2023-11-02 RX ORDER — SPIRONOLACTONE 25 MG/1
25 TABLET ORAL DAILY
Qty: 90 TABLET | Refills: 0 | Status: SHIPPED | OUTPATIENT
Start: 2023-11-02 | End: 2023-12-29 | Stop reason: SDUPTHER

## 2023-11-02 RX ORDER — AMLODIPINE BESYLATE 10 MG/1
10 TABLET ORAL DAILY
Qty: 90 TABLET | Refills: 0 | Status: SHIPPED | OUTPATIENT
Start: 2023-11-02 | End: 2023-12-29 | Stop reason: SDUPTHER

## 2023-11-02 RX ORDER — LOSARTAN POTASSIUM 50 MG/1
50 TABLET ORAL DAILY
Qty: 90 TABLET | Refills: 0 | Status: SHIPPED | OUTPATIENT
Start: 2023-11-02 | End: 2023-12-29 | Stop reason: SDUPTHER

## 2023-11-02 NOTE — PROGRESS NOTES
Resistant Hypertension Follow Up Visit  11/2/2023    LAST SEEN 2/24/2022    Assessment / Plan:   1. Blood Pressure Control:  ACC/AHA (2017) goal <130/80  Home BP at goal:  no  Office BP at goal: no,   Ran out of most meds about 3-4 weeks ago, remains on losartan and carvedilol.  Home BPs 160-170s/90s typically.  Over due for follow up, has not been seen since 2/24/2022.  Reports had tolerated all previous medications.  Plan:   - continue/resume home BP monitoring, reviewed correct technique and given handout at this visit  - Under reasonable control previously on ABPM; could consider rpt in future    2. Work up of Secondary Causes of Resistant Hypertension:   Renovascular HTN:  Renal artery duplex with no evidence or MEGAN (2015), but may not be best test for potential FMD  Primary Aldosteronism: Excluded ARR normal, No adrenal adenoma noted on previous imaging  Thyroid Function: Excluded - TSH WNL 9/9/17  Obstructive Sleep Apnea: Postive - on CPAP, doing well  Pheochromocytoma: Excluded 24 hour urine normal July 2014  Instrinsic renal disease - normal GFR and kidneys unremarkable appearing on sono  Coarctation - not seen on previous imaging/angiogram  Recommendations At This Visit:   - consider MRA renal arteries in future if BP becomes more difficult to control    3. Medication Use / Adherence:  Assessment: Completely compliant previously, has run out of most medications in the last month due to being over due for follow up.    Recommendations: Instructed to Continue Taking All Medications As Prescribed         4. End Organ Damage:   Left Ventricular Hypertrophy: Present on  Echocardiogram Date: 2015  Albuminuria: none 5/2021  Renal Function: Chronic Kidney Disease  Stage 2 at worst (GFR >60)  Established Cardiovascular Disease: None    5. Lifestyle Recommendations:    Smoking: continued complete avoidance of all tobacco products     Physical Activity: encouraged more healthy activity to improve CV fitness;  increase exercise and weight loss.  He is hopeful that with his new work schedule he will have more time to dedicate to exercise.  Unable to discuss at this appt due to time constraints - review and discuss at next/future appt    Weight Management and Nutrition: Dietary plan was discussed with patient at this visit including continued DASH, low sodium diet - review at next appt    6. Standard HTN Pharmacotherapy:  ACEI/ARB:  continue losartan 50mg daily  Thiazide Type Diuretic: Avoid given gout and hypokalemia  Calcium Channel Blocker (CCB): continue/restart amlodipine 10mg daily    7. Additional Agents:  Beta Blocker - continue carvedilol 25mg BID   Mineralocorticoid Receptor Antagonist (MRA) - continue/restart spironolactone 25 mg daily   Peripheral alpha-blockers:  Not indicated at this time   Central alpha-agonists:  Not indicated   Direct vasodilators:  Not indicated   Other:  None      8. Other CV Risk Factors:   Lipids - ASCVD risk >10%  LDLP # and small LDLP well control  TC and LDL still low  TG elevated   1/2023 nonHDL-65, LDL-33, , no current labs  Plan:  - Continue atorvastatin 5mg daily for now  - work on reducing TG in diet; previously discussed, will need to address in future visit  - Continue vit D 4000 units daily   - Recheck lipids prior to next appt    Prediabetes, pmhx of borderline T2D (highest a1c = 6.6)  Last A1c = 5.5 1/2023, no recent labs  Plan:  - encouraged more healthy diet, weight reduction, daily physical activity   - continue/restart metformin 850mg BID to slow or offset progression to T2D as per DPP trial   - A1C now - prior to next appt    9. 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 Ding.  LFTs stable   - defer all further w/u and management to  GI and PCP    Studies Ordered At This Visit:   None   Blood Work to Be Obtained Prior to Next Visit:  CMP, lipids, a1c, microalb  Follow Up:  8 weeks to review BP and labs    Diagnosis:  1. Essential hypertension  Comp Metabolic Panel    MICROALBUMIN CREAT RATIO URINE    amLODIPine (NORVASC) 10 MG Tab    losartan (COZAAR) 50 MG Tab    spironolactone (ALDACTONE) 25 MG Tab      2. Prediabetes  Comp Metabolic Panel    MICROALBUMIN CREAT RATIO URINE    HEMOGLOBIN A1C (Glycohemoglobin GHB Total/A1C with MBG Estimate)    metFORMIN (GLUCOPHAGE) 850 MG Tab      3. Dyslipidemia  Comp Metabolic Panel    Lipid Profile    atorvastatin (LIPITOR) 10 MG Tab      4. Type 2 diabetes mellitus without complication, without long-term current use of insulin (HCC)  metFORMIN (GLUCOPHAGE) 850 MG Tab         History of Present Illness:   Medina Gallo is a 34 yo male seen for f/u of hypertension, dyslidipidemia, [id number], gout and GALILEA    HTN:  Current HTN concerns:  ran out of meds 3-4 weeks ago, is overdue for follow up, last seen 2/24/2022  Reports was doing good when he was taking all his meds.    ADRs: No  HTN sx:  is having HAs occasionally over last week or 2 since running out of meds.  No current blurred or changed vision, chest pain, shortness of breath, nausea, dizziness/vertigo  Denies TIA/CVA  Home BP log:  160-170s/90-100s over last month  24h ABPM completed: could connsider rpt once meds stabalized  Adherence to current HTN meds: much better with improved daily work sched    Hyperlipidemia:    Stable, no current concerns  Current treatment: atorva 5mg   Myalgias? No  Other adverse drug reactions? No  Lipid profile: no current labs    GALILEA:   On CPAP    PreDM:   Tolerated meds prior, ran out last month    Gout:   Stable. No gout flares at present    Needs new PCP    Social History     Tobacco Use    Smoking status: Never    Smokeless tobacco: Never   Vaping Use    Vaping Use: Never used   Substance Use Topics     "Alcohol use: Not Currently     Alcohol/week: 0.0 oz     Comment: 1 per week    Drug use: No     SOCIAL HISTORY  Weight - up a few lbs  BMI Readings from Last 4 Encounters:   11/02/23 41.93 kg/m²   06/28/22 40.94 kg/m²   06/15/22 40.44 kg/m²   05/08/22 41.60 kg/m²      No smoking        Objective:   Allergies:  Patient has no known allergies.    Vitals:    11/02/23 0806 11/02/23 0809   BP: (!) 175/121 (!) 169/107   Pulse: (!) 59 (!) 52   Weight: 114 kg (252 lb)    Height: 1.651 m (5' 5\")      BP Readings from Last 4 Encounters:   11/02/23 (!) 169/107   06/28/22 (!) 140/72   06/15/22 126/88   05/08/22 128/84      Body mass index is 41.93 kg/m².      Physical Exam  Vitals reviewed.   Constitutional:       General: He is not in acute distress.     Appearance: He is well-developed. He is obese. He is not diaphoretic.   HENT:      Head: Normocephalic and atraumatic.   Eyes:      General: No scleral icterus.  Cardiovascular:      Rate and Rhythm: Normal rate and regular rhythm.      Pulses: Normal pulses.      Heart sounds: Normal heart sounds. No murmur heard.  Pulmonary:      Effort: Pulmonary effort is normal. No respiratory distress.      Breath sounds: Normal breath sounds. No wheezing or rales.   Musculoskeletal:         General: No swelling. Normal range of motion.      Right lower leg: No edema.      Left lower leg: No edema.   Skin:     General: Skin is warm and dry.      Coloration: Skin is not pale.   Neurological:      General: No focal deficit present.      Mental Status: He is alert and oriented to person, place, and time.      Motor: No weakness.      Coordination: Coordination normal.      Gait: Gait normal.   Psychiatric:         Mood and Affect: Mood normal.         Behavior: Behavior normal. Behavior is cooperative.         Thought Content: Thought content normal.        Accessory Clinical Findings:   Echocardiogram:  Results for orders placed or performed during the hospital encounter of 07/28/15 "   ECHOCARDIOGRAM COMP W/O CONT   Result Value Ref Range    Eject.Frac. MOD BP 59.37     Eject.Frac. MOD 4C 62.42     Eject.Frac. MOD 2C 60.22       Lab Results   Component Value Date    CHOLSTRLTOT 96 (L) 01/06/2023    LDL 33 01/06/2023    HDL 31 (A) 01/06/2023    TRIGLYCERIDE 158 (H) 01/06/2023      Lab Results   Component Value Date    HBA1C 5.5 01/06/2023      Lab Results   Component Value Date    SODIUM 133 (L) 01/06/2023    POTASSIUM 4.2 01/06/2023    CHLORIDE 99 01/06/2023    CO2 22 01/06/2023    GLUCOSE 96 01/06/2023    BUN 23 (H) 01/06/2023    CREATININE 1.27 01/06/2023     Lab Results   Component Value Date    URCREAT 124.70 06/23/2023    MICROALBUR 41.6 06/23/2023    MALBCRT 334 (H) 06/23/2023        Multiple imaging studies and lab reports were available in EMR and reviewed at today's visit    SUKHJINDER Babin.

## 2023-11-22 ENCOUNTER — TELEPHONE (OUTPATIENT)
Dept: HEALTH INFORMATION MANAGEMENT | Facility: OTHER | Age: 33
End: 2023-11-22
Payer: COMMERCIAL

## 2023-12-26 ENCOUNTER — HOSPITAL ENCOUNTER (OUTPATIENT)
Dept: LAB | Facility: MEDICAL CENTER | Age: 33
End: 2023-12-26
Payer: COMMERCIAL

## 2023-12-26 DIAGNOSIS — E78.5 DYSLIPIDEMIA: ICD-10-CM

## 2023-12-26 DIAGNOSIS — R73.03 PREDIABETES: ICD-10-CM

## 2023-12-26 DIAGNOSIS — I10 ESSENTIAL HYPERTENSION: ICD-10-CM

## 2023-12-26 LAB
ALBUMIN SERPL BCP-MCNC: 4.4 G/DL (ref 3.2–4.9)
ALBUMIN/GLOB SERPL: 1.4 G/DL
ALP SERPL-CCNC: 50 U/L (ref 30–99)
ALT SERPL-CCNC: 39 U/L (ref 2–50)
ANION GAP SERPL CALC-SCNC: 13 MMOL/L (ref 7–16)
AST SERPL-CCNC: 27 U/L (ref 12–45)
BILIRUB SERPL-MCNC: 0.6 MG/DL (ref 0.1–1.5)
BUN SERPL-MCNC: 19 MG/DL (ref 8–22)
CALCIUM ALBUM COR SERPL-MCNC: 8.7 MG/DL (ref 8.5–10.5)
CALCIUM SERPL-MCNC: 9 MG/DL (ref 8.5–10.5)
CHLORIDE SERPL-SCNC: 102 MMOL/L (ref 96–112)
CHOLEST SERPL-MCNC: 106 MG/DL (ref 100–199)
CO2 SERPL-SCNC: 21 MMOL/L (ref 20–33)
CREAT SERPL-MCNC: 1.27 MG/DL (ref 0.5–1.4)
EST. AVERAGE GLUCOSE BLD GHB EST-MCNC: 126 MG/DL
GFR SERPLBLD CREATININE-BSD FMLA CKD-EPI: 75 ML/MIN/1.73 M 2
GLOBULIN SER CALC-MCNC: 3.1 G/DL (ref 1.9–3.5)
GLUCOSE SERPL-MCNC: 114 MG/DL (ref 65–99)
HBA1C MFR BLD: 6 % (ref 4–5.6)
HDLC SERPL-MCNC: 30 MG/DL
LDLC SERPL CALC-MCNC: 35 MG/DL
POTASSIUM SERPL-SCNC: 4.5 MMOL/L (ref 3.6–5.5)
PROT SERPL-MCNC: 7.5 G/DL (ref 6–8.2)
SODIUM SERPL-SCNC: 136 MMOL/L (ref 135–145)
TRIGL SERPL-MCNC: 204 MG/DL (ref 0–149)

## 2023-12-26 PROCEDURE — 80061 LIPID PANEL: CPT

## 2023-12-26 PROCEDURE — 82043 UR ALBUMIN QUANTITATIVE: CPT

## 2023-12-26 PROCEDURE — 36415 COLL VENOUS BLD VENIPUNCTURE: CPT

## 2023-12-26 PROCEDURE — 83036 HEMOGLOBIN GLYCOSYLATED A1C: CPT

## 2023-12-26 PROCEDURE — 82570 ASSAY OF URINE CREATININE: CPT

## 2023-12-26 PROCEDURE — 80053 COMPREHEN METABOLIC PANEL: CPT

## 2023-12-27 LAB
CREAT UR-MCNC: 143.59 MG/DL
MICROALBUMIN UR-MCNC: 2.9 MG/DL
MICROALBUMIN/CREAT UR: 20 MG/G (ref 0–30)

## 2023-12-29 ENCOUNTER — OFFICE VISIT (OUTPATIENT)
Dept: VASCULAR LAB | Facility: MEDICAL CENTER | Age: 33
End: 2023-12-29
Attending: INTERNAL MEDICINE
Payer: COMMERCIAL

## 2023-12-29 VITALS
DIASTOLIC BLOOD PRESSURE: 78 MMHG | HEART RATE: 81 BPM | WEIGHT: 256 LBS | HEIGHT: 65 IN | SYSTOLIC BLOOD PRESSURE: 115 MMHG | BODY MASS INDEX: 42.65 KG/M2

## 2023-12-29 DIAGNOSIS — R73.03 PREDIABETES: ICD-10-CM

## 2023-12-29 DIAGNOSIS — I10 ESSENTIAL HYPERTENSION: ICD-10-CM

## 2023-12-29 DIAGNOSIS — E11.9 TYPE 2 DIABETES MELLITUS WITHOUT COMPLICATION, WITHOUT LONG-TERM CURRENT USE OF INSULIN (HCC): ICD-10-CM

## 2023-12-29 PROCEDURE — 99214 OFFICE O/P EST MOD 30 MIN: CPT

## 2023-12-29 PROCEDURE — 3074F SYST BP LT 130 MM HG: CPT

## 2023-12-29 PROCEDURE — 3078F DIAST BP <80 MM HG: CPT

## 2023-12-29 PROCEDURE — 99212 OFFICE O/P EST SF 10 MIN: CPT

## 2023-12-29 RX ORDER — SPIRONOLACTONE 25 MG/1
25 TABLET ORAL DAILY
Qty: 90 TABLET | Refills: 3 | Status: SHIPPED | OUTPATIENT
Start: 2023-12-29

## 2023-12-29 RX ORDER — LOSARTAN POTASSIUM 50 MG/1
50 TABLET ORAL DAILY
Qty: 90 TABLET | Refills: 3 | Status: SHIPPED | OUTPATIENT
Start: 2023-12-29

## 2023-12-29 RX ORDER — AMLODIPINE BESYLATE 10 MG/1
10 TABLET ORAL DAILY
Qty: 90 TABLET | Refills: 3 | Status: SHIPPED | OUTPATIENT
Start: 2023-12-29

## 2023-12-29 NOTE — PROGRESS NOTES
Resistant Hypertension Follow Up Visit  12/29/2023    Assessment / Plan:   1. Blood Pressure Control:  ACC/AHA (2017) goal <130/80  Home BP at goal:  much improved, 120-135/70/80s  Office BP at goal: yes  Restarted all meds, tolerating  No albuminuria on recent labs  Plan:   - continue/resume home BP monitoring, reviewed correct technique and given handout at this visit  - Under reasonable control previously on ABPM; could consider rpt in future    2. Work up of Secondary Causes of Resistant Hypertension:   Renovascular HTN:  Renal artery duplex with no evidence or MEGAN (2015), but may not be best test for potential FMD  Primary Aldosteronism: Excluded ARR normal, No adrenal adenoma noted on previous imaging  Thyroid Function: Excluded - TSH WNL 9/9/17  Obstructive Sleep Apnea: Postive - on CPAP, doing well  Pheochromocytoma: Excluded 24 hour urine normal July 2014  Instrinsic renal disease - normal GFR and kidneys unremarkable appearing on sono  Coarctation - not seen on previous imaging/angiogram  Recommendations At This Visit:   - consider MRA renal arteries in future if BP becomes more difficult to control    3. Medication Use / Adherence:  Assessment: compliant     Recommendations: Instructed to Continue Taking All Medications As Prescribed         4. End Organ Damage:   Left Ventricular Hypertrophy: Present on  Echocardiogram Date: 2015  Albuminuria: none 5/2021  Renal Function: Chronic Kidney Disease  Stage 2 at worst (GFR >60)  Established Cardiovascular Disease: None    5. Lifestyle Recommendations:    Smoking: continued complete avoidance of all tobacco products     Physical Activity: encouraged more healthy activity to improve CV fitness; increase exercise and weight loss.  He is hopeful that with his new work schedule he will have more time to dedicate to exercise.  Encouraged 150min/week for cardiovascular benefit.  Encouraged adding small activities to his routine that he can build on later. Continue  to review and discuss at future appts    Weight Management and Nutrition: Dietary plan was discussed with patient at this visit including continued DASH, low sodium diet.  Again reviewed diet, focusing on reducing sugars/carbs, and focusing on lean proteins and veggies.  Increase hydration.  Continue to review at future appts    6. Standard HTN Pharmacotherapy:  ACEI/ARB:  continue losartan 50mg daily  Thiazide Type Diuretic: Avoid given gout and hypokalemia  Calcium Channel Blocker (CCB): continue amlodipine 10mg daily    7. Additional Agents:  Beta Blocker - continue carvedilol 25mg BID   Mineralocorticoid Receptor Antagonist (MRA) - continue spironolactone 25 mg daily   Peripheral alpha-blockers:  Not indicated at this time   Central alpha-agonists:  Not indicated   Direct vasodilators:  Not indicated   Other:  None      8. Other CV Risk Factors:   Lipids - ASCVD risk >10%  LDLP # and small LDLP well control  TC and LDL still low  TG elevated   At goal on most recent labs, 12/2023, although TG increased 204 - reviewed  Plan:  - Continue atorvastatin 5mg daily for now  - work on reducing TG in diet; again reviewed   - Continue vit D 4000 units daily   - Recheck lipids in 6 months - order at next appt    Prediabetes, pmhx of borderline T2D (highest a1c = 6.6)  Lab Results   Component Value Date    HBA1C 6.0 (H) 12/26/2023      Lab Results   Component Value Date/Time    MALBCRT 20 12/26/2023 11:26 AM    MICROALBUR 2.9 12/26/2023 11:26 AM   Reviewed sugar/carb reduction  Plan:  - encouraged more healthy diet, weight reduction, daily physical activity - reviewed  - continue metformin 850mg BID to slow or offset progression to T2D as per DPP trial   - recommmend for routine care with PCP (or endocrine) to include regular A1c monitoring, annual albumin/creatinine ratio (ACR), annual diabetic retinopathy screening, foot exams, annual flu vaccine, and updates to pneumonia vaccines as appropriate   - A1C - prior to next  appt    9. Other Issues:  Gout -  avoid thiazide and loop diuretics,  low purine diet,  - otherwise defer management to PCP    GALILEA - continue CPAP and f/u with pulm- encouraged annual follow up       History of ruptured hepatic adenoma in , and -treated surgically by Dr. Ding.  LFTs stable   - defer all further w/u and management to GI and PCP    Instructed to follow-up with PCP for remainder of adult medical needs: yes - needs to establish with new PCP - given scheduling number to call.   We will partner with other providers in the management of established vascular disease and cardiometabolic risk factors.    Studies Ordered At This Visit:   None   Blood Work to Be Obtained Prior to Next Visit:  bmp, a1c  Follow Up:  3 months    Diagnosis:  1. Essential hypertension  amLODIPine (NORVASC) 10 MG Tab    losartan (COZAAR) 50 MG Tab    spironolactone (ALDACTONE) 25 MG Tab    Basic Metabolic Panel      2. Prediabetes  metFORMIN (GLUCOPHAGE) 850 MG Tab      3. Type 2 diabetes mellitus without complication, without long-term current use of insulin (HCC)  metFORMIN (GLUCOPHAGE) 850 MG Tab    HEMOGLOBIN A1C (Glycohemoglobin GHB Total/A1C with MBG Estimate)    Basic Metabolic Panel         History of Present Illness:   Medina Gallo is a 32 yo male seen for f/u of hypertension, dyslidipidemia, [id number], gout and GALILEA    HTN:  Current HTN concerns:  no concerns, doing well  Restarted all meds, tolerating  HAs have resolved since restarting meds    Did labs - reviewed  ADRs: No  HTN sx:  No current blurred or changed vision, chest pain, shortness of breath, nausea, dizziness/vertigo or HA  Denies TIA/CVA  Home BP lo-135/70-80s  24h ABPM completed: could connsider rpt once meds stabalized  Adherence to current HTN meds: much better with improved daily work sched    Hyperlipidemia:    Stable, no current concerns  Current treatment: atorva 5mg   Myalgias? No  Other adverse drug reactions?  "No  Lipid profile: reviewed    GALILEA:   On CPAP    PreDM:   Tolerated meds  Eating a lot of rice/carbs  Decreasing sugary drinks    Gout:   Stable. No gout flares at present    Needs new PCP    Social History     Tobacco Use    Smoking status: Never    Smokeless tobacco: Never   Vaping Use    Vaping Use: Never used   Substance Use Topics    Alcohol use: Not Currently     Alcohol/week: 0.0 oz     Comment: 1 per week    Drug use: No     SOCIAL HISTORY  Weight - up a few lbs again  BMI Readings from Last 4 Encounters:   12/29/23 42.60 kg/m²   11/02/23 41.93 kg/m²   06/28/22 40.94 kg/m²   06/15/22 40.44 kg/m²      No smoking        Objective:   Allergies:  Patient has no known allergies.    Vitals:    12/29/23 0808 12/29/23 0811   BP: 124/81 115/78   BP Location: Left arm Left arm   Patient Position: Sitting Sitting   BP Cuff Size: Large adult Large adult   Pulse: 78 81   Weight: 116 kg (256 lb)    Height: 1.651 m (5' 5\")      BP Readings from Last 4 Encounters:   12/29/23 115/78   11/02/23 (!) 169/107   06/28/22 (!) 140/72   06/15/22 126/88      Body mass index is 42.6 kg/m².      Physical Exam  Vitals reviewed.   Constitutional:       General: He is not in acute distress.     Appearance: He is well-developed. He is obese. He is not diaphoretic.   HENT:      Head: Normocephalic and atraumatic.   Eyes:      General: No scleral icterus.  Cardiovascular:      Rate and Rhythm: Normal rate and regular rhythm.      Pulses: Normal pulses.      Heart sounds: Normal heart sounds. No murmur heard.  Pulmonary:      Effort: Pulmonary effort is normal. No respiratory distress.      Breath sounds: Normal breath sounds. No wheezing or rales.   Musculoskeletal:         General: No swelling. Normal range of motion.      Right lower leg: No edema.      Left lower leg: No edema.   Skin:     General: Skin is warm and dry.      Coloration: Skin is not pale.   Neurological:      General: No focal deficit present.      Mental Status: He is " alert and oriented to person, place, and time.      Motor: No weakness.      Coordination: Coordination normal.      Gait: Gait normal.   Psychiatric:         Mood and Affect: Mood normal.         Behavior: Behavior normal. Behavior is cooperative.         Thought Content: Thought content normal.        Accessory Clinical Findings:   Echocardiogram:  Results for orders placed or performed during the hospital encounter of 07/28/15   ECHOCARDIOGRAM COMP W/O CONT   Result Value Ref Range    Eject.Frac. MOD BP 59.37     Eject.Frac. MOD 4C 62.42     Eject.Frac. MOD 2C 60.22       Lab Results   Component Value Date    CHOLSTRLTOT 106 12/26/2023    LDL 35 12/26/2023    HDL 30 (A) 12/26/2023    TRIGLYCERIDE 204 (H) 12/26/2023      Lab Results   Component Value Date    HBA1C 6.0 (H) 12/26/2023      Lab Results   Component Value Date    SODIUM 136 12/26/2023    POTASSIUM 4.5 12/26/2023    CHLORIDE 102 12/26/2023    CO2 21 12/26/2023    GLUCOSE 114 (H) 12/26/2023    BUN 19 12/26/2023    CREATININE 1.27 12/26/2023     Lab Results   Component Value Date    URCREAT 143.59 12/26/2023    MICROALBUR 2.9 12/26/2023    MALBCRT 20 12/26/2023        Multiple imaging studies and lab reports were available in EMR and reviewed at today's visit    SUKHJINDER Babin.

## 2024-02-28 ENCOUNTER — OFFICE VISIT (OUTPATIENT)
Dept: URGENT CARE | Facility: PHYSICIAN GROUP | Age: 34
End: 2024-02-28
Payer: COMMERCIAL

## 2024-02-28 VITALS
SYSTOLIC BLOOD PRESSURE: 110 MMHG | HEART RATE: 90 BPM | RESPIRATION RATE: 16 BRPM | BODY MASS INDEX: 42.49 KG/M2 | WEIGHT: 255 LBS | OXYGEN SATURATION: 96 % | TEMPERATURE: 97.5 F | DIASTOLIC BLOOD PRESSURE: 80 MMHG | HEIGHT: 65 IN

## 2024-02-28 DIAGNOSIS — M10.9 ACUTE GOUT OF LEFT WRIST, UNSPECIFIED CAUSE: ICD-10-CM

## 2024-02-28 PROCEDURE — 3079F DIAST BP 80-89 MM HG: CPT

## 2024-02-28 PROCEDURE — 99214 OFFICE O/P EST MOD 30 MIN: CPT

## 2024-02-28 PROCEDURE — 3074F SYST BP LT 130 MM HG: CPT

## 2024-02-28 RX ORDER — PREDNISONE 20 MG/1
20 TABLET ORAL 2 TIMES DAILY
Qty: 10 TABLET | Refills: 0 | Status: SHIPPED | OUTPATIENT
Start: 2024-02-28 | End: 2024-02-28

## 2024-02-28 RX ORDER — PREDNISONE 20 MG/1
20 TABLET ORAL 2 TIMES DAILY
Qty: 10 TABLET | Refills: 0 | Status: SHIPPED
Start: 2024-02-28 | End: 2024-03-04

## 2024-02-28 ASSESSMENT — ENCOUNTER SYMPTOMS
FEVER: 0
CHILLS: 0

## 2024-02-28 NOTE — PROGRESS NOTES
Chief Complaint   Patient presents with    Gout     Hx of gout, left hand, x4 days        HISTORY OF PRESENT ILLNESS: Patient is a pleasant 33 y.o. male who presents to urgent care today ongoing history of gout with a flare to the left wrist for the last 4 days despite the use of allopurinol.  Patient admits to eating red meat, drinking only occasionally.  He states he has been taking his medications.  Denies any fevers, no major redness, no drainage from his wrist.    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    Current Outpatient Medications Ordered in Epic   Medication Sig Dispense Refill    predniSONE (DELTASONE) 20 MG Tab Take 1 Tablet by mouth 2 times a day for 5 days. 10 Tablet 0    amLODIPine (NORVASC) 10 MG Tab Take 1 Tablet by mouth every day. 90 Tablet 3    losartan (COZAAR) 50 MG Tab Take 1 Tablet by mouth every day. 90 Tablet 3    spironolactone (ALDACTONE) 25 MG Tab Take 1 Tablet by mouth every day. 90 Tablet 3    metFORMIN (GLUCOPHAGE) 850 MG Tab Take 1 Tablet by mouth 2 times a day with meals. 180 Tablet 3    atorvastatin (LIPITOR) 10 MG Tab Take 0.5 Tablets by mouth every day. 45 Tablet 1    colchicine (COLCRYS) 0.6 MG Tab Take 1 Tablet by mouth every day. 30 Tablet 2    allopurinol (ZYLOPRIM) 100 MG Tab Take 1 Tablet by mouth every day. 90 Tablet 3    carvedilol (COREG) 25 MG Tab Take 1 Tablet by  "mouth 2 times a day with meals. 180 Tablet 3    indomethacin (INDOCIN) 50 MG Cap Take 1 Capsule by mouth 3 times a day. 90 Capsule 0     No current Epic-ordered facility-administered medications on file.       Past Medical History:   Diagnosis Date    Cold     , had a cold for a week, denies SOB, productive cough    Diabetes (HCC)     diet, oral meds    Dyslipidemia 2015    Fatty liver     Gout     High cholesterol     Hypertension 2015    Liver mass     Obese     Sleep apnea     Uses cpap    Snoring        Social History     Tobacco Use    Smoking status: Never    Smokeless tobacco: Never   Vaping Use    Vaping Use: Never used   Substance Use Topics    Alcohol use: Not Currently     Alcohol/week: 0.0 oz     Comment: 1 per week    Drug use: No       Family Status   Relation Name Status    Mo  Alive    Fa      Sis  Alive    Sis  Alive    Sis  Alive    Sis  Alive     Family History   Problem Relation Age of Onset    Hypertension Mother     Heart Disease Father     Hypertension Father     Hypertension Sister     Heart Disease Sister        Review of Systems   Constitutional:  Negative for chills, fever and malaise/fatigue.   Musculoskeletal:  Positive for joint pain.   Skin:  Negative for itching and rash.       Exam:  /80 (BP Location: Right arm, Patient Position: Sitting, BP Cuff Size: Large adult)   Pulse 90   Temp 36.4 °C (97.5 °F) (Temporal)   Resp 16   Ht 1.651 m (5' 5\")   Wt 116 kg (255 lb)   SpO2 96%   Physical Exam  Vitals reviewed.   Constitutional:       Appearance: Normal appearance. He is obese.   HENT:      Head: Normocephalic.      Right Ear: Tympanic membrane and ear canal normal. There is no impacted cerumen. Tympanic membrane is not injected or erythematous.      Left Ear: Tympanic membrane and ear canal normal. There is no impacted cerumen. Tympanic membrane is not injected or erythematous.      Mouth/Throat:      Mouth: Mucous membranes are moist.      Pharynx: " Oropharynx is clear. No oropharyngeal exudate.      Tonsils: No tonsillar exudate. 0 on the right. 0 on the left.   Eyes:      General:         Right eye: No discharge.         Left eye: No discharge.      Extraocular Movements: Extraocular movements intact.      Conjunctiva/sclera: Conjunctivae normal.      Pupils: Pupils are equal, round, and reactive to light.   Cardiovascular:      Rate and Rhythm: Normal rate and regular rhythm.      Pulses: Normal pulses.      Heart sounds: Normal heart sounds. No murmur heard.  Pulmonary:      Effort: Pulmonary effort is normal. No respiratory distress.      Breath sounds: Normal breath sounds. No stridor. No wheezing.   Musculoskeletal:         General: Swelling and tenderness present. Normal range of motion.      Cervical back: Normal range of motion.      Comments: Left wrist   Lymphadenopathy:      Cervical: No cervical adenopathy.   Skin:     General: Skin is warm and dry.      Capillary Refill: Capillary refill takes less than 2 seconds.      Findings: No bruising or rash.   Neurological:      General: No focal deficit present.      Mental Status: He is alert.      Sensory: No sensory deficit.      Motor: No weakness.   Psychiatric:         Mood and Affect: Mood normal.         Behavior: Behavior normal.         Assessment/Plan:  1. Acute gout of left wrist, unspecified cause  - predniSONE (DELTASONE) 20 MG Tab; Take 1 Tablet by mouth 2 times a day for 5 days.  Dispense: 10 Tablet; Refill: 0    Based on physical exam along with review of systems we will go ahead and prescribe prednisone.  Patient has a complex history and is currently on Coreg, therefore unable to prescribe colchicine.  Patient is also a type II diabetic on metformin, I did advise him to please drink more fluids while on prednisone, proper diet to include less red meat.  No drinking.  Patient is aware of the plan and agreeable, advised to follow-up if he continues to get worse or does not  improve.    Supportive care, differential diagnoses, and indications for immediate follow-up discussed with patient.   Pathogenesis of diagnosis discussed including typical length and natural progression.   Instructed to return to clinic or nearest emergency department for any change in condition, further concerns, or worsening of symptoms.  Patient states understanding of the plan of care and discharge instructions.  Instructed to make an appointment, for follow up, with primary care provider.    Please note that this dictation was created using voice recognition software. I have made every reasonable attempt to correct obvious errors, but I expect that there are errors of grammar and possibly content that I did not discover before finalizing the note.      Jennifer JOE

## 2024-02-28 NOTE — LETTER
[facility] [place]  [address]  [place] [address]     February 28, 2024    Patient: Medina Gallo   YOB: 1990   Date of Visit: 2/28/2024       To Whom It May Concern:    Medina Gallo was seen and treated in our department on 2/28/2024.     Sincerely,     SUKHJINDER Gordon.

## 2024-04-09 DIAGNOSIS — I10 ESSENTIAL HYPERTENSION: ICD-10-CM

## 2024-04-09 NOTE — PROGRESS NOTES
Established patient  Chart prep for upcoming appointment with Vascular APRN    Any pending/incomplete orders from last visit? Yes Labs  Was patient called and reminded to complete pending orders? Yes  Were any records requested?  No  Correct appointment type (New/Established/virtual)? Yes  Referral up to date? Yes renewal ordered, sent to provider to co-sign, AND new referral attached to upcoming appointment.    Referral attached to appointment (renewals and New patients only)? Yes      George Garcia, Medical Assistant   [facility]   Ph: [phone]  Fx: [phone]

## 2024-04-12 ENCOUNTER — APPOINTMENT (OUTPATIENT)
Dept: VASCULAR LAB | Facility: MEDICAL CENTER | Age: 34
End: 2024-04-12
Payer: COMMERCIAL

## 2024-05-02 ENCOUNTER — HOSPITAL ENCOUNTER (OUTPATIENT)
Dept: LAB | Facility: MEDICAL CENTER | Age: 34
End: 2024-05-02
Payer: COMMERCIAL

## 2024-05-02 DIAGNOSIS — E11.9 TYPE 2 DIABETES MELLITUS WITHOUT COMPLICATION, WITHOUT LONG-TERM CURRENT USE OF INSULIN (HCC): ICD-10-CM

## 2024-05-02 DIAGNOSIS — I10 ESSENTIAL HYPERTENSION: ICD-10-CM

## 2024-05-02 LAB
ANION GAP SERPL CALC-SCNC: 16 MMOL/L (ref 7–16)
BUN SERPL-MCNC: 20 MG/DL (ref 8–22)
CALCIUM SERPL-MCNC: 9.5 MG/DL (ref 8.5–10.5)
CHLORIDE SERPL-SCNC: 102 MMOL/L (ref 96–112)
CO2 SERPL-SCNC: 20 MMOL/L (ref 20–33)
CREAT SERPL-MCNC: 1.21 MG/DL (ref 0.5–1.4)
EST. AVERAGE GLUCOSE BLD GHB EST-MCNC: 114 MG/DL
GFR SERPLBLD CREATININE-BSD FMLA CKD-EPI: 80 ML/MIN/1.73 M 2
GLUCOSE SERPL-MCNC: 111 MG/DL (ref 65–99)
HBA1C MFR BLD: 5.6 % (ref 4–5.6)
POTASSIUM SERPL-SCNC: 4.2 MMOL/L (ref 3.6–5.5)
SODIUM SERPL-SCNC: 138 MMOL/L (ref 135–145)

## 2024-05-13 ENCOUNTER — OFFICE VISIT (OUTPATIENT)
Dept: VASCULAR LAB | Facility: MEDICAL CENTER | Age: 34
End: 2024-05-13
Attending: NURSE PRACTITIONER
Payer: COMMERCIAL

## 2024-05-13 VITALS
DIASTOLIC BLOOD PRESSURE: 68 MMHG | SYSTOLIC BLOOD PRESSURE: 115 MMHG | HEART RATE: 93 BPM | HEIGHT: 65 IN | WEIGHT: 258 LBS | BODY MASS INDEX: 42.99 KG/M2

## 2024-05-13 DIAGNOSIS — E78.5 DYSLIPIDEMIA: ICD-10-CM

## 2024-05-13 DIAGNOSIS — E11.9 TYPE 2 DIABETES MELLITUS WITHOUT COMPLICATION, WITHOUT LONG-TERM CURRENT USE OF INSULIN (HCC): ICD-10-CM

## 2024-05-13 DIAGNOSIS — E78.1 HYPERTRIGLYCERIDEMIA: ICD-10-CM

## 2024-05-13 DIAGNOSIS — I10 ESSENTIAL HYPERTENSION: ICD-10-CM

## 2024-05-13 PROBLEM — R73.03 PREDIABETES: Status: RESOLVED | Noted: 2022-06-28 | Resolved: 2024-05-13

## 2024-05-13 PROCEDURE — 99214 OFFICE O/P EST MOD 30 MIN: CPT | Performed by: NURSE PRACTITIONER

## 2024-05-13 PROCEDURE — 3078F DIAST BP <80 MM HG: CPT | Performed by: NURSE PRACTITIONER

## 2024-05-13 PROCEDURE — 3074F SYST BP LT 130 MM HG: CPT | Performed by: NURSE PRACTITIONER

## 2024-05-13 NOTE — PROGRESS NOTES
Resistant Hypertension Follow Up Visit  5/13/2024    Assessment / Plan:   1. Blood Pressure Control:  ACC/AHA (2017) goal <130/80  Home BP at goal:  much improved, 120-135/70/80s  Office BP at goal: yes  Restarted all meds, tolerating  No albuminuria on recent labs  Plan:   - continue/resume home BP monitoring, reviewed correct technique and given handout at this visit  - Under reasonable control previously on ABPM; could consider rpt in future    2. Work up of Secondary Causes of Resistant Hypertension:   Renovascular HTN:  Renal artery duplex with no evidence or MEGAN (2015), but may not be best test for potential FMD  Primary Aldosteronism: Excluded ARR normal, No adrenal adenoma noted on previous imaging  Thyroid Function: Excluded - TSH WNL 9/9/17  Obstructive Sleep Apnea: Postive - on CPAP, doing well  Pheochromocytoma: Excluded 24 hour urine normal July 2014  Instrinsic renal disease - normal GFR and kidneys unremarkable appearing on sono  Coarctation - not seen on previous imaging/angiogram  Recommendations At This Visit:   - consider MRA renal arteries in future if BP becomes more difficult to control    3. Medication Use / Adherence:  Assessment: compliant     Recommendations: Instructed to Continue Taking All Medications As Prescribed         4. End Organ Damage:   Left Ventricular Hypertrophy: Present on  Echocardiogram Date: 2015  Albuminuria: none 5/2021  Renal Function: Chronic Kidney Disease  Stage 2 at worst (GFR >60)  Established Cardiovascular Disease: None    5. Lifestyle Recommendations:    Smoking: continued complete avoidance of all tobacco products     Physical Activity: encouraged more healthy activity to improve CV fitness; increase exercise and weight loss.  He wants to exercise more and realizes the benefits.  Encouraged 150min/week for cardiovascular benefit.  Encouraged adding small activities to his routine that he can build on later-- walking 20-30 min per day at work and/or at home.  Continue to review and discuss at future appts    Weight Management and Nutrition: Dietary plan was discussed with patient at this visit including continued DASH, low sodium diet.  Again reviewed diet, focusing on reducing sugars/carbs, and focusing on lean proteins and veggies.  Increase hydration.  Continue to review at future appts    6. Standard HTN Pharmacotherapy:  ACEI/ARB:  continue losartan 50mg daily  Thiazide Type Diuretic: Avoid given gout and hypokalemia  Calcium Channel Blocker (CCB): continue amlodipine 10mg daily    7. Additional Agents:  Beta Blocker - continue carvedilol 25mg BID   Mineralocorticoid Receptor Antagonist (MRA) - continue spironolactone 25 mg daily   Peripheral alpha-blockers:  Not indicated at this time   Central alpha-agonists:  Not indicated   Direct vasodilators:  Not indicated   Other:  None      8. Other CV Risk Factors:   Lipids - ASCVD risk >10%  LDLP # and small LDLP well control  TC and LDL still low  TG elevated   At goal on most recent labs, 12/2023, although TG increased 204 - reviewed  Due for lipid panel before next visit   Plan:  - Continue atorvastatin 5mg daily for now  - work on reducing TG in diet; again reviewed   - Restart vit D 4000 units daily   - Recheck lipids now     Prediabetes, pmhx of borderline T2D (highest a1c = 6.6)  Lab Results   Component Value Date    HBA1C 5.6 05/02/2024      Lab Results   Component Value Date/Time    MALBCRT 20 12/26/2023 11:26 AM    MICROALBUR 2.9 12/26/2023 11:26 AM   Reviewed sugar/carb reduction  Plan:  - encouraged more healthy diet, weight reduction, daily physical activity - reviewed  - continue metformin 850mg BID to slow or offset progression to T2D as per DPP trial   - recommmend for routine care with PCP (or endocrine) to include regular A1c monitoring, annual albumin/creatinine ratio (ACR), annual diabetic retinopathy screening, foot exams, annual flu vaccine, and updates to pneumonia vaccines as appropriate   - A1C -  prior to next appt    9. Other Issues:  Gout -  avoid thiazide and loop diuretics,  low purine diet,  - otherwise defer management to PCP    GALILEA - continue CPAP and f/u with pulm- encouraged annual follow up       History of ruptured hepatic adenoma in , and -treated surgically by Dr. Ding.  LFTs stable   - defer all further w/u and management to GI and PCP    Instructed to follow-up with PCP for remainder of adult medical needs: yes - needs to establish with new PCP - given scheduling number to call.   We will partner with other providers in the management of established vascular disease and cardiometabolic risk factors.    Studies Ordered At This Visit:   None   Blood Work to Be Obtained Prior to Next Visit:  as below   Follow Up:  6 months     Diagnosis:  1. Essential hypertension  Basic Metabolic Panel      2. Hypertriglyceridemia  Lipid Profile      3. Type 2 diabetes mellitus without complication, without long-term current use of insulin (HCC)  HEMOGLOBIN A1C      4. Dyslipidemia           History of Present Illness:   Medina Gallo is a 34 yo male seen for f/u of hypertension, dyslidipidemia, [id number], gout and GALILEA    HTN:  Current HTN concerns:  no concerns, doing well  Restarted all meds, tolerating  HAs have resolved since restarting meds    Did labs - reviewed  ADRs: No  HTN sx:  No current blurred or changed vision, chest pain, shortness of breath, nausea, dizziness/vertigo or HA  Denies TIA/CVA  Home BP lo-120/70-80s  24h ABPM completed: could connsider rpt once meds stabalized  Adherence to current HTN meds: much better with improved daily work sched    Hyperlipidemia:    Stable, no current concerns  Current treatment: atorva 5mg   Myalgias? No  Other adverse drug reactions? No  Lipid profile: reviewed    GALILEA:   On CPAP    DM:   Tolerated meds  Eating a lot of rice/carbs  Decreasing sugary drinks    Gout:   Stable. No gout flares at present    Needs new PCP-  "establishing this week with PCP     Social History     Tobacco Use    Smoking status: Never    Smokeless tobacco: Never   Vaping Use    Vaping Use: Never used   Substance Use Topics    Alcohol use: Not Currently     Alcohol/week: 0.0 oz     Comment: 1 per week    Drug use: No     SOCIAL HISTORY  Weight - up a few lbs again  BMI Readings from Last 4 Encounters:   05/13/24 42.93 kg/m²   02/28/24 42.43 kg/m²   12/29/23 42.60 kg/m²   11/02/23 41.93 kg/m²      No smoking      Objective:   Allergies:  Patient has no known allergies.    /68 (BP Location: Left arm, Patient Position: Sitting, BP Cuff Size: Large adult)   Pulse 93   Ht 1.651 m (5' 5\")   Wt 117 kg (258 lb)   BMI 42.93 kg/m²     BP Readings from Last 4 Encounters:   05/13/24 115/68   02/28/24 110/80   12/29/23 115/78   11/02/23 (!) 169/107      Body mass index is 42.93 kg/m².      Physical Exam  Vitals reviewed.   Constitutional:       General: He is not in acute distress.     Appearance: He is well-developed. He is obese. He is not diaphoretic.   HENT:      Head: Normocephalic and atraumatic.   Eyes:      General: No scleral icterus.  Cardiovascular:      Rate and Rhythm: Normal rate and regular rhythm.      Pulses: Normal pulses.      Heart sounds: Normal heart sounds. No murmur heard.  Pulmonary:      Effort: Pulmonary effort is normal. No respiratory distress.      Breath sounds: Normal breath sounds. No wheezing or rales.   Musculoskeletal:         General: No swelling. Normal range of motion.      Right lower leg: No edema.      Left lower leg: No edema.   Skin:     General: Skin is warm and dry.      Coloration: Skin is not pale.   Neurological:      General: No focal deficit present.      Mental Status: He is alert and oriented to person, place, and time.      Motor: No weakness.      Coordination: Coordination normal.      Gait: Gait normal.   Psychiatric:         Mood and Affect: Mood normal.         Behavior: Behavior normal. Behavior is " cooperative.         Thought Content: Thought content normal.        Accessory Clinical Findings:   Echocardiogram:  Results for orders placed or performed during the hospital encounter of 07/28/15   ECHOCARDIOGRAM COMP W/O CONT   Result Value Ref Range    Eject.Frac. MOD BP 59.37     Eject.Frac. MOD 4C 62.42     Eject.Frac. MOD 2C 60.22       Lab Results   Component Value Date    CHOLSTRLTOT 106 12/26/2023    LDL 35 12/26/2023    HDL 30 (A) 12/26/2023    TRIGLYCERIDE 204 (H) 12/26/2023      Lab Results   Component Value Date    HBA1C 5.6 05/02/2024      Lab Results   Component Value Date    SODIUM 138 05/02/2024    POTASSIUM 4.2 05/02/2024    CHLORIDE 102 05/02/2024    CO2 20 05/02/2024    GLUCOSE 111 (H) 05/02/2024    BUN 20 05/02/2024    CREATININE 1.21 05/02/2024     Lab Results   Component Value Date    URCREAT 143.59 12/26/2023    MICROALBUR 2.9 12/26/2023    MALBCRT 20 12/26/2023        Multiple imaging studies and lab reports were available in EMR and reviewed at today's visit    TIMBO Bar.

## 2024-05-15 ENCOUNTER — OFFICE VISIT (OUTPATIENT)
Dept: MEDICAL GROUP | Facility: IMAGING CENTER | Age: 34
End: 2024-05-15
Payer: COMMERCIAL

## 2024-05-15 VITALS
HEIGHT: 65 IN | TEMPERATURE: 97.7 F | OXYGEN SATURATION: 96 % | HEART RATE: 76 BPM | SYSTOLIC BLOOD PRESSURE: 118 MMHG | RESPIRATION RATE: 16 BRPM | DIASTOLIC BLOOD PRESSURE: 74 MMHG | BODY MASS INDEX: 42.99 KG/M2 | WEIGHT: 258 LBS

## 2024-05-15 DIAGNOSIS — E78.5 DYSLIPIDEMIA: ICD-10-CM

## 2024-05-15 DIAGNOSIS — E66.01 CLASS 3 SEVERE OBESITY WITH SERIOUS COMORBIDITY AND BODY MASS INDEX (BMI) OF 40.0 TO 44.9 IN ADULT, UNSPECIFIED OBESITY TYPE (HCC): ICD-10-CM

## 2024-05-15 DIAGNOSIS — I10 ESSENTIAL HYPERTENSION: ICD-10-CM

## 2024-05-15 DIAGNOSIS — I51.7 LVH (LEFT VENTRICULAR HYPERTROPHY): ICD-10-CM

## 2024-05-15 DIAGNOSIS — Z11.4 SCREENING FOR HIV (HUMAN IMMUNODEFICIENCY VIRUS): ICD-10-CM

## 2024-05-15 DIAGNOSIS — R73.03 PREDIABETES: ICD-10-CM

## 2024-05-15 DIAGNOSIS — R16.0 LIVER MASS: ICD-10-CM

## 2024-05-15 DIAGNOSIS — G47.33 OSA (OBSTRUCTIVE SLEEP APNEA): ICD-10-CM

## 2024-05-15 DIAGNOSIS — I27.20 MILD PULMONARY HYPERTENSION (HCC): ICD-10-CM

## 2024-05-15 DIAGNOSIS — M10.9 GOUT OF MULTIPLE SITES, UNSPECIFIED CAUSE, UNSPECIFIED CHRONICITY: ICD-10-CM

## 2024-05-15 DIAGNOSIS — Z13.21 ENCOUNTER FOR VITAMIN DEFICIENCY SCREENING: ICD-10-CM

## 2024-05-15 PROBLEM — R73.01 ELEVATED FASTING GLUCOSE: Status: RESOLVED | Noted: 2020-09-08 | Resolved: 2024-05-15

## 2024-05-15 PROCEDURE — 3078F DIAST BP <80 MM HG: CPT | Performed by: STUDENT IN AN ORGANIZED HEALTH CARE EDUCATION/TRAINING PROGRAM

## 2024-05-15 PROCEDURE — 99214 OFFICE O/P EST MOD 30 MIN: CPT | Performed by: STUDENT IN AN ORGANIZED HEALTH CARE EDUCATION/TRAINING PROGRAM

## 2024-05-15 PROCEDURE — 3074F SYST BP LT 130 MM HG: CPT | Performed by: STUDENT IN AN ORGANIZED HEALTH CARE EDUCATION/TRAINING PROGRAM

## 2024-05-15 ASSESSMENT — PATIENT HEALTH QUESTIONNAIRE - PHQ9: CLINICAL INTERPRETATION OF PHQ2 SCORE: 0

## 2024-05-15 NOTE — PATIENT INSTRUCTIONS
Thank you for choosing [facility]. It was a pleasure meeting you today.     Take care!  Carmencita WilsonSt. [facility]

## 2024-05-15 NOTE — PROGRESS NOTES
Subjective:       CC:   Chief Complaint   Patient presents with    Establish Care       HPI:     Verbal consent was acquired by the patient to use [employer] ambient listening note generation during this visit Yes      Medina is a 33 y.o. male is new to me and is here today to establish care. Previous PCP was Munir Sun PA-C.  Pt would like to discuss the following today    History of Present Illness  The patient presents to the office to establish care.    Past medical history remarkable for:    HTN: Chronic.  Managed by cardiology.  Currently well-controlled with multiple antihypertensives.  Denies chest pain, shortness of breath, palpitations, and dizziness.    Gout: Chronic and stable with allopurinol 100 mg daily.  Patient is due for repeated uric acid levels.  Last flare was about 2 months ago.    History of liver mass with abscess: occurred in 2012. S/P hepatic ablation in 2012 and 2013.  Denies history of alcohol abuse.  Denies any abdominal pain, nausea, or vomiting.    GALILEA: Chronic and stable.  He is using his CPAP machine most of the nights.  Denies symptoms such as snoring, apnea, daytime fatigue, and morning Has.     Prediabetes: Well-controlled with metformin.  He is currently on metformin a 850 mg twice daily.  Last A1c was 5.6 5/2024.       Reports doing well and does not have concerns today.    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 90 Tablet 3    losartan (COZAAR) 50 MG Tab Take 1 Tablet by mouth every day. 90 Tablet 3    spironolactone (ALDACTONE) 25 MG Tab Take 1 Tablet by mouth every day. 90 Tablet 3    metFORMIN (GLUCOPHAGE) 850 MG Tab Take 1 Tablet by mouth 2 times a day with meals. 180 Tablet 3    atorvastatin (LIPITOR) 10 MG Tab Take 0.5 Tablets by mouth every day. 45 Tablet 1    allopurinol (ZYLOPRIM) 100 MG Tab Take 1 Tablet by mouth every day. 90 Tablet 3    carvedilol (COREG) 25 MG Tab Take 1 Tablet by mouth 2 times a day with meals. 180 Tablet 3     No current facility-administered medications for this visit.       Allergies as of 05/15/2024    (No Known Allergies)       Social History     Socioeconomic History    Marital status: Single     Spouse name: Not on file    Number of children: Not on file    Years of education: Not on file    Highest education level: GED or equivalent   Occupational History    Not on file   Tobacco Use    Smoking status: Never    Smokeless tobacco: Never   Vaping Use    Vaping status: Never Used   Substance and Sexual Activity    Alcohol use: Not Currently     Alcohol/week: 0.0 oz     Comment: 1 per week    Drug use: No    Sexual activity: Not Currently   Other Topics Concern    Not on file   Social History Narrative    Not on file     Social Determinants of Health     Financial Resource Strain: Low Risk  (6/28/2022)    Overall Financial Resource Strain (CARDIA)     Difficulty of Paying Living Expenses: Not very hard   Food Insecurity: No Food Insecurity (6/28/2022)     Hunger Vital Sign     Worried About Running Out of Food in the Last Year: Never true     Ran Out of Food in the Last Year: Never true   Transportation Needs: No Transportation Needs (6/28/2022)    PRAPARE - Transportation     Lack of Transportation (Medical): No     Lack of Transportation (Non-Medical): No   Physical Activity: Insufficiently Active (6/28/2022)    Exercise Vital Sign     Days of Exercise per Week: 1 day     Minutes of Exercise per Session: 50 min   Stress: No Stress Concern Present (6/28/2022)    Citizen of the [place] of Occupational Health - Occupational Stress Questionnaire     Feeling of Stress : Only a little   Social Connections: Moderately Isolated (6/28/2022)    Social Connection and Isolation Panel [NHANES]     Frequency of Communication with Friends and Family: More than three times a week     Frequency of Social Gatherings with Friends and Family: Once a week     Attends Congregational Services: 1 to 4 times per year     Active Member of Clubs or Organizations: No     Attends Club or Organization Meetings: Never     Marital Status: Never    Intimate Partner Violence: Not on file   Housing Stability: Low Risk  (6/28/2022)    Housing Stability Vital Sign     Unable to Pay for Housing in the Last Year: No     Number of Places Lived in the Last Year: 1     Unstable Housing in the Last Year: No       Family History   Problem Relation Age of Onset    Hypertension Mother     Heart Disease Father     Hypertension Father     Hypertension Sister     Heart Disease Sister        Past Surgical History:   Procedure Laterality Date    GASTROSCOPY  8/24/2018    Procedure: GASTROSCOPY - POSS BIOPSY, DILATION, POLYPECTOMY, CONTROL OF HEMORRHAGE;  Surgeon: Munir Thomas M.D.;  Location: [facility];  Service: Gastroenterology    EGD W/ENDOSCOPIC ULTRASOUND  8/24/2018    Procedure: EGD W/ENDOSCOPIC ULTRASOUND;  Surgeon: Munir Thomas M.D.;  Location: [facility];  Service:  "Gastroenterology    EGD WITH ASP/BX  8/24/2018    Procedure: EGD WITH ASP/BX - FNA;  Surgeon: Munir Thomas M.D.;  Location: SURGERY [facility];  Service: Gastroenterology    HEPATIC ABLATION LAPAROSCOPIC  5/9/2013    Performed by Shai Ding M.D. at SURGERY [facility]    HEPATIC ABLATION LAPAROSCOPIC  11/19/2012    Performed by Shai Ding M.D. at SURGERY [facility]    RECOVERY  11/15/2012    Performed by [facility] at SURGERY SAME DAY [facility]    LIVER BIOPSY  11/9/12 and 11/15/12           Objective:     /74 (BP Location: Left arm, Patient Position: Sitting, BP Cuff Size: Adult)   Pulse 76   Temp 36.5 °C (97.7 °F) (Temporal)   Resp 16   Ht 1.651 m (5' 5\")   Wt 117 kg (258 lb)   SpO2 96%   BMI 42.93 kg/m²     Physical Exam  Vitals reviewed.   Constitutional:       General: He is not in acute distress.     Appearance: Normal appearance.   HENT:      Head: Normocephalic and atraumatic.   Eyes:      General: No scleral icterus.  Neck:      Thyroid: No thyroid mass or thyromegaly.      Vascular: No carotid bruit.   Cardiovascular:      Rate and Rhythm: Normal rate and regular rhythm.      Pulses: Normal pulses.      Heart sounds: Normal heart sounds. No murmur heard.  Pulmonary:      Effort: Pulmonary effort is normal. No respiratory distress.      Breath sounds: Normal breath sounds. No wheezing.   Abdominal:      General: Bowel sounds are normal. There is no distension.      Palpations: Abdomen is soft. There is no mass.      Tenderness: There is no abdominal tenderness.   Musculoskeletal:         General: No swelling. Normal range of motion.      Cervical back: Normal range of motion and neck supple. No tenderness.   Lymphadenopathy:      Cervical: No cervical adenopathy.   Skin:     General: Skin is warm and dry.      Coloration: Skin is not jaundiced.      Findings: No bruising.   Neurological:      General: No focal deficit present.      " Mental Status: He is alert and oriented to person, place, and time.      Gait: Gait normal.   Psychiatric:         Mood and Affect: Mood normal.         Behavior: Behavior normal.         Thought Content: Thought content normal.         Judgment: Judgment normal.            Assessment and Plan:     Assessment & Plan    1. Essential hypertension  Chronic. The patient's blood pressure is well-managed. The patient's current medication regimen will be maintained, with continued monitoring of his blood pressure.  Will continue with losartan 50 mg daily, spironolactone 25 mg daily, carvedilol 25 mg twice daily daily and amlodipine 10 mg daily.  He is due for TSH and CBC.  Will follow-up with cardiology as planned.  - EC-ECHOCARDIOGRAM COMPLETE W/O CONT; Future  - TSH WITH REFLEX TO FT4; Future  - CBC WITH DIFFERENTIAL; Future    2. LVH (left ventricular hypertrophy)  3. Mild pulmonary hypertension (HCC)  Chronic.  These were found during an echocardiogram performed in 2015.  Patient has not undergone further evaluation since 2015.  Recommend repeating echocardiogram for surveillance.  Patient is agreeable.  Order placed.  Will follow-up with patient after completing echocardiogram.  Pt will follow-up with cardiology as planned.  - EC-ECHOCARDIOGRAM COMPLETE W/O CONT; Future    4. Gout of multiple sites, unspecified cause, unspecified chronicity  Chronic and stable. A uric acid test will be conducted. The patient is advised to adhere to a diet low in purines and avoiding alcohol. Should the patient experience further gout flare-ups, he is instructed to contact us immediately for a prescription medication.  - URIC ACID; Future    5. Liver mass  S/p hepatic ablation in 2012 and 2013. Recommend liver ultrasound for surveillance.  Order placed.  - US-RUQ; Future    6. Dyslipidemia  Chronic and uncontrolled.  Patient has a pending order to repeat lipid test.  Encouraged patient to complete lipid test when he can.  Will continue  with Lipitor 5 mg daily.  - TSH WITH REFLEX TO FT4; Future  - CBC WITH DIFFERENTIAL; Future    7. GALILEA (obstructive sleep apnea)  Chronic and stable.  Will continue with CPAP.  Recommend sleep test in the near future.  - TSH WITH REFLEX TO FT4; Future  - CBC WITH DIFFERENTIAL; Future    8. Prediabetes  Chronic and well-controlled.  Most recent A1c was 5.6 5/2024.  The patient is advised to continue with metformin  850mg BID to help with weight loss, with a focus on a low-sugar, low-carbohydrate diet, and physical activity daily or most days of the week.   - TSH WITH REFLEX TO FT4; Future  - CBC WITH DIFFERENTIAL; Future    9. Screening for HIV (human immunodeficiency virus)  - HIV AG/AB COMBO ASSAY SCREENING; Future    10. Encounter for vitamin deficiency screening  - VITAMIN D,25 HYDROXY (DEFICIENCY); Future    11. Class 3 severe obesity with serious comorbidity and body mass index (BMI) of 40.0 to 44.9 in adult, unspecified obesity type (HCC)  Chronic.  Discussed lifestyle modifications to help lose weight and help prevent disease such as diabetes and heart disease. He is due for TSH and CBC.  CMP was remarkable for hyperglycemia otherwise normal 12/2023.   Improve your eating habits.   Eat a variety of foods from each food group: include whole grains, vegetables, fruits, low fat or fat free dairy, and protein foods, mostly plant based. Limit foods high in fat, sugar, calories, and sodium. Eat slowly, and don't do anything else, such as watch TV, while you are eating. Pay attention to portion sizes. Put your food on a smaller plate, follow MyPlate guidelines:vegetables make up the largest section, followed by grains. Together, fruits and vegetables fill half the plate, while proteins and grains fill the other half.  Regular activity can help you feel better, have more energy, and burn more calories. If you haven't been active, start slowly. Start with at least 30 minutes of moderate activity on most days of the  week; then gradually increase the amount of activity. Try for 60 or 90 minutes a day, at least 5 days a week.   - TSH WITH REFLEX TO FT4; Future  - CBC WITH DIFFERENTIAL; Future          Diagnosis and treatment plan explained to pt.  Pt agreed with treatment plan and verbalized understanding.       Return in about 6 months (around 11/15/2024) for Lab Results.     Please note that this dictation was created using voice recognition software. I have made every reasonable attempt to correct obvious errors, but I expect that there are errors of grammar and possibly content that I did not discover before finalizing the note.    Carmencita Harris PA-C  [facility]

## 2024-06-24 DIAGNOSIS — I10 ESSENTIAL HYPERTENSION: ICD-10-CM

## 2024-06-24 RX ORDER — CARVEDILOL 25 MG/1
25 TABLET ORAL 2 TIMES DAILY WITH MEALS
Qty: 180 TABLET | Refills: 3 | Status: SHIPPED | OUTPATIENT
Start: 2024-06-24

## 2024-06-24 RX ORDER — LOSARTAN POTASSIUM 50 MG/1
50 TABLET ORAL DAILY
Qty: 90 TABLET | Refills: 3 | Status: SHIPPED | OUTPATIENT
Start: 2024-06-24

## 2024-06-24 NOTE — TELEPHONE ENCOUNTER
Received request via: Pharmacy    Was the patient seen in the last year in this department? Yes    Does the patient have an active prescription (recently filled or refills available) for medication(s) requested? No    Pharmacy Name: [id number]    Does the patient have [facility] and need 100 day supply (blood pressure, diabetes and cholesterol meds only)? Patient does not have SCP

## 2024-07-12 DIAGNOSIS — E78.5 DYSLIPIDEMIA: ICD-10-CM

## 2024-07-12 RX ORDER — ATORVASTATIN CALCIUM 10 MG/1
TABLET, FILM COATED ORAL
Qty: 45 TABLET | Refills: 3 | Status: SHIPPED | OUTPATIENT
Start: 2024-07-12

## 2024-11-09 ENCOUNTER — HOSPITAL ENCOUNTER (OUTPATIENT)
Dept: LAB | Facility: MEDICAL CENTER | Age: 34
End: 2024-11-09
Attending: STUDENT IN AN ORGANIZED HEALTH CARE EDUCATION/TRAINING PROGRAM
Payer: COMMERCIAL

## 2024-11-09 DIAGNOSIS — G47.33 OSA (OBSTRUCTIVE SLEEP APNEA): ICD-10-CM

## 2024-11-09 DIAGNOSIS — I10 ESSENTIAL HYPERTENSION: ICD-10-CM

## 2024-11-09 DIAGNOSIS — E78.5 DYSLIPIDEMIA: ICD-10-CM

## 2024-11-09 DIAGNOSIS — R73.03 PREDIABETES: ICD-10-CM

## 2024-11-09 DIAGNOSIS — Z13.21 ENCOUNTER FOR VITAMIN DEFICIENCY SCREENING: ICD-10-CM

## 2024-11-09 DIAGNOSIS — E66.01 CLASS 3 SEVERE OBESITY WITH SERIOUS COMORBIDITY AND BODY MASS INDEX (BMI) OF 40.0 TO 44.9 IN ADULT, UNSPECIFIED OBESITY TYPE (HCC): ICD-10-CM

## 2024-11-09 DIAGNOSIS — Z11.4 SCREENING FOR HIV (HUMAN IMMUNODEFICIENCY VIRUS): ICD-10-CM

## 2024-11-09 DIAGNOSIS — M10.9 GOUT OF MULTIPLE SITES, UNSPECIFIED CAUSE, UNSPECIFIED CHRONICITY: ICD-10-CM

## 2024-11-09 DIAGNOSIS — E66.813 CLASS 3 SEVERE OBESITY WITH SERIOUS COMORBIDITY AND BODY MASS INDEX (BMI) OF 40.0 TO 44.9 IN ADULT, UNSPECIFIED OBESITY TYPE (HCC): ICD-10-CM

## 2024-11-09 LAB
25(OH)D3 SERPL-MCNC: 10 NG/ML (ref 30–100)
ANION GAP SERPL CALC-SCNC: 14 MMOL/L (ref 7–16)
BASOPHILS # BLD AUTO: 0.8 % (ref 0–1.8)
BASOPHILS # BLD: 0.06 K/UL (ref 0–0.12)
BUN SERPL-MCNC: 16 MG/DL (ref 8–22)
CALCIUM SERPL-MCNC: 9.7 MG/DL (ref 8.5–10.5)
CHLORIDE SERPL-SCNC: 101 MMOL/L (ref 96–112)
CHOLEST SERPL-MCNC: 85 MG/DL (ref 100–199)
CO2 SERPL-SCNC: 22 MMOL/L (ref 20–33)
CREAT SERPL-MCNC: 1.15 MG/DL (ref 0.5–1.4)
EOSINOPHIL # BLD AUTO: 0.37 K/UL (ref 0–0.51)
EOSINOPHIL NFR BLD: 5.1 % (ref 0–6.9)
ERYTHROCYTE [DISTWIDTH] IN BLOOD BY AUTOMATED COUNT: 41.1 FL (ref 35.9–50)
EST. AVERAGE GLUCOSE BLD GHB EST-MCNC: 123 MG/DL
FASTING STATUS PATIENT QL REPORTED: NORMAL
GFR SERPLBLD CREATININE-BSD FMLA CKD-EPI: 84 ML/MIN/1.73 M 2
GLUCOSE SERPL-MCNC: 108 MG/DL (ref 65–99)
HBA1C MFR BLD: 5.9 % (ref 4–5.6)
HCT VFR BLD AUTO: 46.8 % (ref 42–52)
HDLC SERPL-MCNC: 24 MG/DL
HGB BLD-MCNC: 16 G/DL (ref 14–18)
HIV 1+2 AB+HIV1 P24 AG SERPL QL IA: NORMAL
IMM GRANULOCYTES # BLD AUTO: 0.04 K/UL (ref 0–0.11)
IMM GRANULOCYTES NFR BLD AUTO: 0.6 % (ref 0–0.9)
LDLC SERPL CALC-MCNC: 26 MG/DL
LYMPHOCYTES # BLD AUTO: 1.8 K/UL (ref 1–4.8)
LYMPHOCYTES NFR BLD: 25 % (ref 22–41)
MCH RBC QN AUTO: 31 PG (ref 27–33)
MCHC RBC AUTO-ENTMCNC: 34.2 G/DL (ref 32.3–36.5)
MCV RBC AUTO: 90.7 FL (ref 81.4–97.8)
MONOCYTES # BLD AUTO: 0.61 K/UL (ref 0–0.85)
MONOCYTES NFR BLD AUTO: 8.5 % (ref 0–13.4)
NEUTROPHILS # BLD AUTO: 4.33 K/UL (ref 1.82–7.42)
NEUTROPHILS NFR BLD: 60 % (ref 44–72)
NRBC # BLD AUTO: 0 K/UL
NRBC BLD-RTO: 0 /100 WBC (ref 0–0.2)
PLATELET # BLD AUTO: 244 K/UL (ref 164–446)
PMV BLD AUTO: 9.7 FL (ref 9–12.9)
POTASSIUM SERPL-SCNC: 4.1 MMOL/L (ref 3.6–5.5)
RBC # BLD AUTO: 5.16 M/UL (ref 4.7–6.1)
SODIUM SERPL-SCNC: 137 MMOL/L (ref 135–145)
TRIGL SERPL-MCNC: 174 MG/DL (ref 0–149)
TSH SERPL DL<=0.005 MIU/L-ACNC: 1.08 UIU/ML (ref 0.38–5.33)
URATE SERPL-MCNC: 10.7 MG/DL (ref 2.5–8.3)
WBC # BLD AUTO: 7.2 K/UL (ref 4.8–10.8)

## 2024-11-09 PROCEDURE — 36415 COLL VENOUS BLD VENIPUNCTURE: CPT

## 2024-11-09 PROCEDURE — 80061 LIPID PANEL: CPT

## 2024-11-09 PROCEDURE — 80048 BASIC METABOLIC PNL TOTAL CA: CPT

## 2024-11-09 PROCEDURE — 82306 VITAMIN D 25 HYDROXY: CPT

## 2024-11-09 PROCEDURE — 84550 ASSAY OF BLOOD/URIC ACID: CPT

## 2024-11-09 PROCEDURE — 85025 COMPLETE CBC W/AUTO DIFF WBC: CPT

## 2024-11-09 PROCEDURE — 84443 ASSAY THYROID STIM HORMONE: CPT

## 2024-11-09 PROCEDURE — 83036 HEMOGLOBIN GLYCOSYLATED A1C: CPT

## 2024-11-09 PROCEDURE — 87389 HIV-1 AG W/HIV-1&-2 AB AG IA: CPT

## 2024-11-11 ENCOUNTER — APPOINTMENT (OUTPATIENT)
Dept: MEDICAL GROUP | Facility: IMAGING CENTER | Age: 34
End: 2024-11-11
Payer: COMMERCIAL

## 2024-11-11 VITALS
SYSTOLIC BLOOD PRESSURE: 122 MMHG | DIASTOLIC BLOOD PRESSURE: 88 MMHG | BODY MASS INDEX: 41.95 KG/M2 | HEIGHT: 65 IN | WEIGHT: 251.8 LBS | HEART RATE: 97 BPM | TEMPERATURE: 97.9 F | RESPIRATION RATE: 18 BRPM | OXYGEN SATURATION: 96 %

## 2024-11-11 DIAGNOSIS — E78.1 HYPERTRIGLYCERIDEMIA: ICD-10-CM

## 2024-11-11 DIAGNOSIS — M1A.09X0 CHRONIC GOUT OF MULTIPLE SITES, UNSPECIFIED CAUSE: ICD-10-CM

## 2024-11-11 DIAGNOSIS — R73.03 PREDIABETES: ICD-10-CM

## 2024-11-11 DIAGNOSIS — Z71.2 ENCOUNTER TO DISCUSS TEST RESULTS: ICD-10-CM

## 2024-11-11 DIAGNOSIS — E55.9 VITAMIN D DEFICIENCY: ICD-10-CM

## 2024-11-11 DIAGNOSIS — Z80.0 FAMILY HISTORY OF STOMACH CANCER: ICD-10-CM

## 2024-11-11 PROCEDURE — 99214 OFFICE O/P EST MOD 30 MIN: CPT | Performed by: STUDENT IN AN ORGANIZED HEALTH CARE EDUCATION/TRAINING PROGRAM

## 2024-11-11 PROCEDURE — 3079F DIAST BP 80-89 MM HG: CPT | Performed by: STUDENT IN AN ORGANIZED HEALTH CARE EDUCATION/TRAINING PROGRAM

## 2024-11-11 PROCEDURE — 3074F SYST BP LT 130 MM HG: CPT | Performed by: STUDENT IN AN ORGANIZED HEALTH CARE EDUCATION/TRAINING PROGRAM

## 2024-11-11 RX ORDER — ALLOPURINOL 100 MG/1
100 TABLET ORAL DAILY
Qty: 90 TABLET | Refills: 3 | Status: CANCELLED | OUTPATIENT
Start: 2024-11-11

## 2024-11-11 RX ORDER — ALLOPURINOL 200 MG/1
200 TABLET ORAL DAILY
Qty: 30 TABLET | Refills: 2 | Status: SHIPPED | OUTPATIENT
Start: 2024-11-11

## 2024-11-11 RX ORDER — CHOLECALCIFEROL (VITAMIN D3) 1250 MCG
1 CAPSULE ORAL
Qty: 12 CAPSULE | Refills: 0 | Status: SHIPPED | OUTPATIENT
Start: 2024-11-11

## 2024-11-11 ASSESSMENT — FIBROSIS 4 INDEX: FIB4 SCORE: 0.64

## 2024-11-11 NOTE — PROGRESS NOTES
"Subjective:     CC:   Chief Complaint   Patient presents with    Lab Results     11/9    Requesting Labs     Genetic screening or cancer. Mom has been diagnosed with stomach  cancer        HPI:   eren Collier, 34 y.o., male,  presents today to discuss:       Test results: completed lab tests. Has upcoming appt for liver u/s. Has not scheduled ECHO yet.     Gout f/u: Reports two flares since last visit.  He treated the flares with over-the-counter NSAIDs.    Reports her mom is very ill from recent diagnosis of stomach cancer. He's requesting referral for genetic testing.  Denies abdominal pain, nausea, vomiting, diarrhea, and blood in the stools.    ROS:  Denies fever, chills, CP, and SOB    Medications, allergies, past medical history, family history, surgical history, and social history documented in chart and reviewed by me.       Objective:   Exam:  /88 (BP Location: Left arm, Patient Position: Sitting, BP Cuff Size: Adult)   Pulse 97   Temp 36.6 °C (97.9 °F) (Temporal)   Resp 18   Ht 1.651 m (5' 5\")   Wt 114 kg (251 lb 12.8 oz)   SpO2 96%   BMI 41.90 kg/m²      General: In no acute distress. Normal appearance.   Head:   Atraumatic, normocephalic.   Neck: Supple without lymphadenopathy.   Pulmonary: Normal effort, clear to auscultation bilaterally, no wheezes, rhonchi, or rales  Cardiovascular:   Regular rate and rhythm. No murmurs, rubs, or gallops.  Musculoskeletal:  Normal ROM. No edema.    Skin: No visible rashes or lesions.  Neurological: Alert and oriented to person, place, and time. Gait normal.   Psychiatric: Normal mood and affect. Calm and friendly behavior. Speech clear. Normal judgement and insight.         Assessment & Plan:       1. Prediabetes  Chronic and stable.  A1c is 5.9.  Recommend increasing metformin to 1000 mg with food twice daily.  He is agreeable.  Prescription provided.  Will repeat A1c in 3 months.  Encouraged healthy diet and exercise.  - metformin " (GLUCOPHAGE) 1000 MG tablet; Take 1 Tablet by mouth 2 times a day with meals.  Dispense: 180 Tablet; Refill: 3  - HEMOGLOBIN A1C; Future  - HEPATIC FUNCTION PANEL; Future   Latest Reference Range & Units 11/09/24 10:10   Glycohemoglobin 4.0 - 5.6 % 5.9 (H)   (H): Data is abnormally high    2. Hypertriglyceridemia  Chronic and stable.  Lipid panel reviewed with patient.  Triglycerides decreased significantly.  Will continue with atorvastatin 10 mg nightly.  Recommend consuming less carbs and less fatty meals as well as to exercise daily.  Will continue to monitor.  - HEPATIC FUNCTION PANEL; Future   Latest Reference Range & Units 12/26/23 11:26 11/09/24 10:10   Cholesterol,Tot 100 - 199 mg/dL 106 85 (L)   Triglycerides 0 - 149 mg/dL 204 (H) 174 (H)   HDL >=40 mg/dL 30 ! 24 !   LDL <100 mg/dL 35 26   (L): Data is abnormally low  (H): Data is abnormally high  !: Data is abnormal    3. Chronic gout of multiple sites, unspecified cause  Chronic and uncontrolled.  Patient experienced 2 flares since his last visit.  Uric acid level  is 10.7.  Allopurinol increased to 200 mg daily.  Recommend repeating uric acid in 4 weeks.  Recommend low purine diet.  - Allopurinol 200 MG Tab; Take 200 mg by mouth every day.  Dispense: 30 Tablet; Refill: 2  - URIC ACID; Future   Latest Reference Range & Units 11/09/24 10:10   Uric Acid 2.5 - 8.3 mg/dL 10.7 (H)   (H): Data is abnormally high    4. Vitamin D deficiency  Acute.  New finding.  Vitamin D3 50,000 IUs weekly initiated.  Recommend repeating levels in 3 months.   Latest Reference Range & Units 11/09/24 10:10   25-Hydroxy   Vitamin D 25 30 - 100 ng/mL 10 (L)   (L): Data is abnormally low  - Cholecalciferol (VITAMIN D3) 1.25 MG (66728 UT) Cap; Take 1 Capsule by mouth every 7 days.  Dispense: 12 Capsule; Refill: 0  - VITAMIN D,25 HYDROXY (DEFICIENCY); Future    5. Family history of stomach cancer  - Referral to Genetics    6. Encounter to discuss test results  CBC, BMP, uric acid,  A1c, lipid panel, vitamin D, HIV, and TSH were reviewed today.      Diagnosis and treatment plan explained to pt. Counseled pt on new medication(s) and potential side effects. Pt agreed with treatment plan and verbalized understanding.     Return in about 3 months (around 2/11/2025) for Lab Results.     Please note that this dictation was created using voice recognition software. I have made every reasonable attempt to correct obvious errors, but I expect that there are errors of grammar and possibly content that I did not discover before finalizing the note.    Carmencita Harris PA-C  [facility]

## 2024-11-14 ENCOUNTER — OFFICE VISIT (OUTPATIENT)
Dept: VASCULAR LAB | Facility: MEDICAL CENTER | Age: 34
End: 2024-11-14
Attending: NURSE PRACTITIONER
Payer: COMMERCIAL

## 2024-11-14 VITALS
DIASTOLIC BLOOD PRESSURE: 87 MMHG | WEIGHT: 248 LBS | HEIGHT: 65 IN | SYSTOLIC BLOOD PRESSURE: 125 MMHG | BODY MASS INDEX: 41.32 KG/M2 | HEART RATE: 72 BPM

## 2024-11-14 DIAGNOSIS — E78.5 DYSLIPIDEMIA: ICD-10-CM

## 2024-11-14 DIAGNOSIS — R73.03 PREDIABETES: ICD-10-CM

## 2024-11-14 DIAGNOSIS — E78.1 HYPERTRIGLYCERIDEMIA: ICD-10-CM

## 2024-11-14 DIAGNOSIS — I10 ESSENTIAL HYPERTENSION: ICD-10-CM

## 2024-11-14 PROBLEM — E11.9 TYPE 2 DIABETES MELLITUS WITHOUT COMPLICATION, WITHOUT LONG-TERM CURRENT USE OF INSULIN (HCC): Status: ACTIVE | Noted: 2024-11-14

## 2024-11-14 PROCEDURE — 3074F SYST BP LT 130 MM HG: CPT | Performed by: NURSE PRACTITIONER

## 2024-11-14 PROCEDURE — 99212 OFFICE O/P EST SF 10 MIN: CPT

## 2024-11-14 PROCEDURE — 3079F DIAST BP 80-89 MM HG: CPT | Performed by: NURSE PRACTITIONER

## 2024-11-14 PROCEDURE — 99214 OFFICE O/P EST MOD 30 MIN: CPT | Performed by: NURSE PRACTITIONER

## 2024-11-14 ASSESSMENT — FIBROSIS 4 INDEX
FIB4 SCORE: 0.64
FIB4 SCORE: 0.64

## 2024-11-14 NOTE — PROGRESS NOTES
Resistant Hypertension Follow Up Visit  11/14/2024    Assessment / Plan:   1. Blood Pressure Control:  ACC/AHA (2017) goal <130/80  Home BP at goal:  much improved, 120-135/70/80s  Office BP at goal: yes  Restarted all meds, tolerating  No albuminuria on recent labs  Plan:   - Continue/resume home BP monitoring, reviewed correct technique and given handout at this visit  - Under reasonable control previously on ABPM; could consider rpt in future    2. Work up of Secondary Causes of Resistant Hypertension:   Renovascular HTN:  Renal artery duplex with no evidence or MEGAN (2015), but may not be best test for potential FMD  Primary Aldosteronism: Excluded ARR normal, No adrenal adenoma noted on previous imaging  Thyroid Function: Excluded - TSH WNL 9/9/17  Obstructive Sleep Apnea: Postive - on CPAP, doing well  Pheochromocytoma: Excluded 24 hour urine normal July 2014  Instrinsic renal disease - normal GFR and kidneys unremarkable appearing on sono  Coarctation - not seen on previous imaging/angiogram  Recommendations At This Visit:   - consider MRA renal arteries in future if BP becomes more difficult to control    3. Medication Use / Adherence:  Assessment: compliant     Recommendations: Instructed to Continue Taking All Medications As Prescribed         4. End Organ Damage:   Left Ventricular Hypertrophy: Present on  Echocardiogram Date: 2015  Albuminuria: none 5/2021  Renal Function: Chronic Kidney Disease  Stage 2 at worst (GFR >60)  Established Cardiovascular Disease: None    5. Lifestyle Recommendations:    Smoking: continued complete avoidance of all tobacco products     Physical Activity: Been a really difficult time because his mom recently passed away from [place].  Encouraged more healthy activity when he is able to increase exercise and weight loss.  He wants to exercise more and realizes the benefits.  Encouraged 150min/week for cardiovascular benefit.  Encouraged adding small activities to his routine that he  can build on later-- walking 20-30 min per day at work and/or at home. Continue to review and discuss at future appts    Weight Management and Nutrition: Down 10 lbs! Dietary plan was discussed with patient at this visit including continued DASH, low sodium diet.  Again reviewed diet, focusing on reducing sugars/carbs, and focusing on lean proteins and veggies.  Increase hydration.  Continue to review at future appts    6. Standard HTN Pharmacotherapy:  ACEI/ARB:  continue losartan 50mg daily  Thiazide Type Diuretic: Avoid given gout and hypokalemia  Calcium Channel Blocker (CCB): continue amlodipine 10mg daily    7. Additional Agents:  Beta Blocker - continue carvedilol 25mg BID   Mineralocorticoid Receptor Antagonist (MRA) - continue spironolactone 25 mg daily   Peripheral alpha-blockers:  Not indicated at this time   Central alpha-agonists:  Not indicated   Direct vasodilators:  Not indicated   Other:  None      8. Other CV Risk Factors:   Lipids - ASCVD risk >10%  LDLP # and small LDLP well control  TG elevated but improved  Otherwise at goal   Vit D very low   Plan:  - Continue atorvastatin 5mg daily for now  - work on reducing TG in diet; again reviewed   - Restart vit D as directed by PCP   - Recheck lipids now     Prediabetes, pmhx of borderline T2D (highest a1c = 6.6)  Lab Results   Component Value Date    HBA1C 5.9 (H) 11/09/2024      Lab Results   Component Value Date/Time    MALBCRT 20 12/26/2023 11:26 AM    MICROALBUR 2.9 12/26/2023 11:26 AM   Reviewed sugar/carb reduction  Plan:  - encouraged more healthy diet, weight reduction, daily physical activity - reviewed  - continue metformin 850mg BID to slow or offset progression to T2D as per DPP trial   - recommmend for routine care with PCP (or endocrine) to include regular A1c monitoring, annual albumin/creatinine ratio (ACR), annual diabetic retinopathy screening, foot exams, annual flu vaccine, and updates to pneumonia vaccines as appropriate   - A1C -  prior to next appt    9. 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 Ding.  LFTs stable   - defer all further w/u and management to GI and PCP    Instructed to follow-up with PCP for remainder of adult medical needs: yes - needs to establish with new PCP - given scheduling number to call.   We will partner with other providers in the management of established vascular disease and cardiometabolic risk factors.    Studies Ordered At This Visit:   None   Blood Work to Be Obtained Prior to Next Visit:  as below   Follow Up: 6 months     Diagnosis:  1. Essential hypertension  Comp Metabolic Panel    MICROALBUMIN CREAT RATIO URINE      2. Dyslipidemia  Lipid Profile      3. Hypertriglyceridemia        4. Prediabetes  HEMOGLOBIN A1C         History of Present Illness:   Medina Gallo is a 35 yo male seen for f/u of hypertension, dyslidipidemia, [id number], gout and GALILEA    His mom recently passed away from cancer and it has been a difficult time  He is doing ok and has good family support     HTN:  Current HTN concerns:  no concerns, doing well  Restarted all meds, tolerating  HAs have resolved since restarting meds    Did labs - reviewed  ADRs: No  HTN sx:  No current blurred or changed vision, chest pain, shortness of breath, nausea, dizziness/vertigo or HA  Denies TIA/CVA  Home BP log: not checking recently due to stress with him mother   24h ABPM completed: could connsider rpt once meds stabalized  Adherence to current HTN meds: much better with improved daily work sched    Hyperlipidemia:    Stable, no current concerns  Current treatment: atorva 5mg   Myalgias? No  Other adverse drug reactions? No  Lipid profile: reviewed    GALILEA:   On CPAP    DM:   Tolerated meds  PCP  "increased metformin recently   Down 10 lbs - though he reports he wasn't trying to lose     Gout:   Stable. No gout flares at present      Social History     Tobacco Use    Smoking status: Never    Smokeless tobacco: Never   Vaping Use    Vaping status: Never Used   Substance Use Topics    Alcohol use: Not Currently     Alcohol/week: 0.0 oz     Comment: 1 per week    Drug use: No     SOCIAL HISTORY  Weight - down 10 lbs   BMI Readings from Last 4 Encounters:   11/14/24 41.27 kg/m²   11/11/24 41.90 kg/m²   05/15/24 42.93 kg/m²   05/13/24 42.93 kg/m²      No smoking      Objective:   Allergies:  Patient has no known allergies.    /87 (BP Location: Left arm, Patient Position: Sitting, BP Cuff Size: Large adult)   Pulse 72   Ht 1.651 m (5' 5\")   Wt 112 kg (248 lb)   BMI 41.27 kg/m²     BP Readings from Last 4 Encounters:   11/14/24 125/87   11/11/24 122/88   05/15/24 118/74   05/13/24 115/68      Body mass index is 41.27 kg/m².      Physical Exam  Vitals reviewed.   Constitutional:       General: He is not in acute distress.     Appearance: He is well-developed. He is obese. He is not diaphoretic.   HENT:      Head: Normocephalic and atraumatic.   Eyes:      General: No scleral icterus.  Cardiovascular:      Rate and Rhythm: Normal rate and regular rhythm.      Pulses: Normal pulses.      Heart sounds: Normal heart sounds. No murmur heard.  Pulmonary:      Effort: Pulmonary effort is normal. No respiratory distress.      Breath sounds: Normal breath sounds. No wheezing or rales.   Musculoskeletal:         General: No swelling. Normal range of motion.      Right lower leg: No edema.      Left lower leg: No edema.   Skin:     General: Skin is warm and dry.      Coloration: Skin is not pale.   Neurological:      General: No focal deficit present.      Mental Status: He is alert and oriented to person, place, and time.      Motor: No weakness.      Coordination: Coordination normal.      Gait: Gait normal. "   Psychiatric:         Mood and Affect: Mood normal.         Behavior: Behavior normal. Behavior is cooperative.         Thought Content: Thought content normal.        Accessory Clinical Findings:   Echocardiogram:  Results for orders placed or performed during the hospital encounter of 07/28/15   ECHOCARDIOGRAM COMP W/O CONT   Result Value Ref Range    Eject.Frac. MOD BP 59.37     Eject.Frac. MOD 4C 62.42     Eject.Frac. MOD 2C 60.22       Lab Results   Component Value Date    CHOLSTRLTOT 85 (L) 11/09/2024    LDL 26 11/09/2024    HDL 24 (A) 11/09/2024    TRIGLYCERIDE 174 (H) 11/09/2024      Lab Results   Component Value Date    HBA1C 5.9 (H) 11/09/2024      Lab Results   Component Value Date    SODIUM 137 11/09/2024    POTASSIUM 4.1 11/09/2024    CHLORIDE 101 11/09/2024    CO2 22 11/09/2024    GLUCOSE 108 (H) 11/09/2024    BUN 16 11/09/2024    CREATININE 1.15 11/09/2024     Lab Results   Component Value Date    URCREAT 143.59 12/26/2023    MICROALBUR 2.9 12/26/2023    MALBCRT 20 12/26/2023        Multiple imaging studies and lab reports were available in EMR and reviewed at today's visit    TIMBO Bar.

## 2024-11-15 ENCOUNTER — HOSPITAL ENCOUNTER (OUTPATIENT)
Dept: RADIOLOGY | Facility: MEDICAL CENTER | Age: 34
End: 2024-11-15
Attending: STUDENT IN AN ORGANIZED HEALTH CARE EDUCATION/TRAINING PROGRAM
Payer: COMMERCIAL

## 2024-11-15 DIAGNOSIS — K76.0 HEPATIC STEATOSIS: ICD-10-CM

## 2024-11-15 DIAGNOSIS — K80.20 CALCULUS OF GALLBLADDER WITHOUT CHOLECYSTITIS WITHOUT OBSTRUCTION: ICD-10-CM

## 2024-11-15 DIAGNOSIS — R16.0 LIVER MASS: ICD-10-CM

## 2024-11-15 PROCEDURE — 76705 ECHO EXAM OF ABDOMEN: CPT

## 2024-11-30 DIAGNOSIS — I10 ESSENTIAL HYPERTENSION: ICD-10-CM

## 2024-12-02 RX ORDER — AMLODIPINE BESYLATE 10 MG/1
10 TABLET ORAL DAILY
Qty: 90 TABLET | Refills: 3 | Status: SHIPPED | OUTPATIENT
Start: 2024-12-02

## 2024-12-30 DIAGNOSIS — I10 ESSENTIAL HYPERTENSION: ICD-10-CM

## 2024-12-31 RX ORDER — SPIRONOLACTONE 25 MG/1
25 TABLET ORAL DAILY
Qty: 90 TABLET | Refills: 3 | Status: SHIPPED | OUTPATIENT
Start: 2024-12-31

## 2025-02-10 ENCOUNTER — APPOINTMENT (OUTPATIENT)
Dept: MEDICAL GROUP | Facility: IMAGING CENTER | Age: 35
End: 2025-02-10
Payer: COMMERCIAL

## 2025-02-20 DIAGNOSIS — M1A.09X0 CHRONIC GOUT OF MULTIPLE SITES, UNSPECIFIED CAUSE: ICD-10-CM

## 2025-02-20 RX ORDER — ALLOPURINOL 100 MG/1
TABLET ORAL
Qty: 180 TABLET | Refills: 0 | Status: SHIPPED | OUTPATIENT
Start: 2025-02-20

## 2025-02-20 NOTE — TELEPHONE ENCOUNTER
Received request via: Pharmacy    Was the patient seen in the last year in this department? Yes    Does the patient have an active prescription (recently filled or refills available) for medication(s) requested? No    Pharmacy Name: [facility]    Does the patient have retirement Plus and need 100-day supply? (This applies to ALL medications) Patient does not have SCP

## 2025-03-28 ENCOUNTER — APPOINTMENT (OUTPATIENT)
Dept: MEDICAL GROUP | Facility: IMAGING CENTER | Age: 35
End: 2025-03-28
Payer: COMMERCIAL

## 2025-04-19 ENCOUNTER — HOSPITAL ENCOUNTER (OUTPATIENT)
Dept: LAB | Facility: MEDICAL CENTER | Age: 35
End: 2025-04-19
Attending: STUDENT IN AN ORGANIZED HEALTH CARE EDUCATION/TRAINING PROGRAM
Payer: COMMERCIAL

## 2025-04-19 DIAGNOSIS — E78.5 DYSLIPIDEMIA: ICD-10-CM

## 2025-04-19 DIAGNOSIS — E78.1 HYPERTRIGLYCERIDEMIA: ICD-10-CM

## 2025-04-19 DIAGNOSIS — M1A.09X0 CHRONIC GOUT OF MULTIPLE SITES, UNSPECIFIED CAUSE: ICD-10-CM

## 2025-04-19 DIAGNOSIS — I10 ESSENTIAL HYPERTENSION: ICD-10-CM

## 2025-04-19 DIAGNOSIS — R73.03 PREDIABETES: ICD-10-CM

## 2025-04-19 DIAGNOSIS — E55.9 VITAMIN D DEFICIENCY: ICD-10-CM

## 2025-04-19 LAB
25(OH)D3 SERPL-MCNC: 25 NG/ML (ref 30–100)
ALBUMIN SERPL BCP-MCNC: 4.5 G/DL (ref 3.2–4.9)
ALP SERPL-CCNC: 51 U/L (ref 30–99)
ALT SERPL-CCNC: 60 U/L (ref 2–50)
AST SERPL-CCNC: 37 U/L (ref 12–45)
BILIRUB CONJ SERPL-MCNC: 0.4 MG/DL (ref 0.1–0.5)
BILIRUB INDIRECT SERPL-MCNC: 0.6 MG/DL (ref 0–1)
BILIRUB SERPL-MCNC: 1 MG/DL (ref 0.1–1.5)
EST. AVERAGE GLUCOSE BLD GHB EST-MCNC: 137 MG/DL
HBA1C MFR BLD: 6.4 % (ref 4–5.6)
PROT SERPL-MCNC: 7.9 G/DL (ref 6–8.2)
URATE SERPL-MCNC: 7.6 MG/DL (ref 2.5–8.3)

## 2025-04-19 PROCEDURE — 83036 HEMOGLOBIN GLYCOSYLATED A1C: CPT

## 2025-04-19 PROCEDURE — 80076 HEPATIC FUNCTION PANEL: CPT

## 2025-04-19 PROCEDURE — 82306 VITAMIN D 25 HYDROXY: CPT

## 2025-04-19 PROCEDURE — 36415 COLL VENOUS BLD VENIPUNCTURE: CPT

## 2025-04-19 PROCEDURE — 84550 ASSAY OF BLOOD/URIC ACID: CPT

## 2025-04-22 ENCOUNTER — RESULTS FOLLOW-UP (OUTPATIENT)
Dept: MEDICAL GROUP | Facility: IMAGING CENTER | Age: 35
End: 2025-04-22
Payer: COMMERCIAL

## 2025-04-26 DIAGNOSIS — R73.03 PREDIABETES: ICD-10-CM

## 2025-04-28 DIAGNOSIS — R73.03 PREDIABETES: ICD-10-CM

## 2025-04-28 NOTE — TELEPHONE ENCOUNTER
Received request via: Pharmacy    Was the patient seen in the last year in this department? Yes    Does the patient have an active prescription (recently filled or refills available) for medication(s) requested? No    Pharmacy Name: [facility]     Does the patient have FPC Plus and need 100-day supply? (This applies to ALL medications) Patient does not have SCP

## 2025-04-28 NOTE — TELEPHONE ENCOUNTER
Received request via: Patient    Was the patient seen in the last year in this department? Yes    Does the patient have an active prescription (recently filled or refills available) for medication(s) requested? No    Pharmacy Name: [facility]    Does the patient have prison Plus and need 100-day supply? (This applies to ALL medications) Patient does not have SCP

## 2025-05-02 ENCOUNTER — RESULTS FOLLOW-UP (OUTPATIENT)
Dept: MEDICAL GROUP | Facility: IMAGING CENTER | Age: 35
End: 2025-05-02

## 2025-05-02 ENCOUNTER — APPOINTMENT (OUTPATIENT)
Dept: MEDICAL GROUP | Facility: IMAGING CENTER | Age: 35
End: 2025-05-02
Payer: COMMERCIAL

## 2025-05-02 ENCOUNTER — ANCILLARY PROCEDURE (OUTPATIENT)
Dept: CARDIOLOGY | Facility: MEDICAL CENTER | Age: 35
End: 2025-05-02
Attending: STUDENT IN AN ORGANIZED HEALTH CARE EDUCATION/TRAINING PROGRAM
Payer: COMMERCIAL

## 2025-05-02 VITALS
OXYGEN SATURATION: 96 % | DIASTOLIC BLOOD PRESSURE: 78 MMHG | BODY MASS INDEX: 44.03 KG/M2 | RESPIRATION RATE: 14 BRPM | HEIGHT: 65 IN | TEMPERATURE: 97.2 F | HEART RATE: 82 BPM | WEIGHT: 264.3 LBS | SYSTOLIC BLOOD PRESSURE: 122 MMHG

## 2025-05-02 DIAGNOSIS — R73.03 PREDIABETES: ICD-10-CM

## 2025-05-02 DIAGNOSIS — K76.0 HEPATIC STEATOSIS: ICD-10-CM

## 2025-05-02 DIAGNOSIS — I10 ESSENTIAL HYPERTENSION: ICD-10-CM

## 2025-05-02 DIAGNOSIS — I51.7 LVH (LEFT VENTRICULAR HYPERTROPHY): ICD-10-CM

## 2025-05-02 DIAGNOSIS — Z63.8: ICD-10-CM

## 2025-05-02 DIAGNOSIS — R74.01 ELEVATED ALT MEASUREMENT: ICD-10-CM

## 2025-05-02 DIAGNOSIS — G47.33 OSA (OBSTRUCTIVE SLEEP APNEA): ICD-10-CM

## 2025-05-02 DIAGNOSIS — I27.20 MILD PULMONARY HYPERTENSION (HCC): ICD-10-CM

## 2025-05-02 DIAGNOSIS — M1A.09X0 CHRONIC GOUT OF MULTIPLE SITES, UNSPECIFIED CAUSE: ICD-10-CM

## 2025-05-02 DIAGNOSIS — E66.813 CLASS 3 SEVERE OBESITY WITH SERIOUS COMORBIDITY AND BODY MASS INDEX (BMI) OF 40.0 TO 44.9 IN ADULT, UNSPECIFIED OBESITY TYPE: ICD-10-CM

## 2025-05-02 PROBLEM — E11.9 TYPE 2 DIABETES MELLITUS WITHOUT COMPLICATION, WITHOUT LONG-TERM CURRENT USE OF INSULIN (HCC): Status: RESOLVED | Noted: 2024-11-14 | Resolved: 2025-05-02

## 2025-05-02 PROBLEM — E66.9 OBESITY, UNSPECIFIED: Status: ACTIVE | Noted: 2025-05-02

## 2025-05-02 LAB — LV EJECT FRACT  99904: 65

## 2025-05-02 PROCEDURE — 3078F DIAST BP <80 MM HG: CPT | Performed by: STUDENT IN AN ORGANIZED HEALTH CARE EDUCATION/TRAINING PROGRAM

## 2025-05-02 PROCEDURE — 93306 TTE W/DOPPLER COMPLETE: CPT

## 2025-05-02 PROCEDURE — 99215 OFFICE O/P EST HI 40 MIN: CPT | Performed by: STUDENT IN AN ORGANIZED HEALTH CARE EDUCATION/TRAINING PROGRAM

## 2025-05-02 PROCEDURE — 93306 TTE W/DOPPLER COMPLETE: CPT | Mod: 26 | Performed by: INTERNAL MEDICINE

## 2025-05-02 PROCEDURE — 3074F SYST BP LT 130 MM HG: CPT | Performed by: STUDENT IN AN ORGANIZED HEALTH CARE EDUCATION/TRAINING PROGRAM

## 2025-05-02 RX ORDER — TIRZEPATIDE 2.5 MG/.5ML
2.5 INJECTION, SOLUTION SUBCUTANEOUS
Qty: 2 ML | Refills: 1 | Status: SHIPPED | OUTPATIENT
Start: 2025-05-02 | End: 2025-05-28

## 2025-05-02 RX ORDER — OMEPRAZOLE 40 MG/1
40 CAPSULE, DELAYED RELEASE ORAL DAILY
COMMUNITY

## 2025-05-02 SDOH — SOCIAL STABILITY - SOCIAL INSECURITY: OTHER SPECIFIED PROBLEMS RELATED TO PRIMARY SUPPORT GROUP: Z63.8

## 2025-05-02 ASSESSMENT — PATIENT HEALTH QUESTIONNAIRE - PHQ9: CLINICAL INTERPRETATION OF PHQ2 SCORE: 0

## 2025-05-02 ASSESSMENT — FIBROSIS 4 INDEX: FIB4 SCORE: 0.7

## 2025-05-02 NOTE — PROGRESS NOTES
Subjective:     CC:   Chief Complaint   Patient presents with    Lab Results       HPI:     Verbal consent was acquired by the patient to use [employer] ambient listening note generation during this visit Yes      eren Collier, 34 y.o., male,  presents today to discuss:     History of Present Illness    Grieving  The patient reports experiencing depressive symptoms since the passing of his mother, significantly impacting his motivation levels. He describes anhedonia and social withdrawal, limiting interactions primarily to work-related activities. He resides alone and denies any suicidal ideation. Consideration is being given to initiating psychotherapy.    Prediabetes  For prediabetes management, the patient is currently taking Metformin 1000mg BID. Due to a delay in refilling his prescription, he has been using an older dose of 750 mg, with the refill anticipated tomorrow.    HTN  The patient consulted with a vascular specialist, Dr. Glenna Moralez, in November 2024 for blood pressure management. A follow-up appointment is scheduled for May 28, 2025. An echocardiogram is scheduled for today. He continues to take antihypertensive medication. There is no history of myocardial infarction, but he recalls an episode of severe hypertension with systolic blood pressure exceeding 200 mmHg.    Gout flare  The patient is taking Allopurinol for gout management and reports no recent flare-ups.    GALILEA  He utilizes a CPAP machine for the management of obstructive sleep apnea.    The patient expresses interest in exploring the use of Zepbound injections for weight loss, contingent upon insurance coverage. He reports no history of pancreatitis or diabetic retinopathy. He is unaware of any family history of thyroid cancer or multiple endocrine neoplasia syndrome.    Hepatitis steatosis follow up  The patient was evaluated by a gastroenterologist in March 2025, where a colonoscopy yielded normal results. An  "endoscopy revealed mild esophagitis due to gastroesophageal reflux disease (GERD), for which Omeprazole was prescribed to be taken before meals. The liver was not evaluated during this visit. He reports no abdominal pain or discomfort from cholelithiasis. Alcohol consumption is infrequent and primarily occurs in social settings. Due to his mother's history of stomach cancer, a colonoscopy every 7 years is advised. Genetic testing was not conducted due to insufficient family history of cancer.    SOCIAL HISTORY  - Rarely drinks alcohol, usually in social settings    FAMILY HISTORY  - Mother had stomach cancer and passed away at age 77       ROS:  See HPI    Medications, allergies, past medical history, family history, surgical history, and social history documented in chart and reviewed by me.       Objective:   Exam:  /78 (BP Location: Right arm, Patient Position: Sitting, BP Cuff Size: Adult)   Pulse 82   Temp 36.2 °C (97.2 °F) (Temporal)   Resp 14   Ht 1.651 m (5' 5\")   Wt 120 kg (264 lb 4.8 oz)   SpO2 96%   BMI 43.98 kg/m²      General: In no acute distress. Normal appearance.   Head:   Atraumatic, normocephalic.   Neck: Supple without lymphadenopathy.   Pulmonary: Normal effort, clear to auscultation bilaterally, no wheezes, rhonchi, or rales  Cardiovascular:   Regular rate and rhythm. No murmurs, rubs, or gallops.  Musculoskeletal:  Normal ROM. No edema.    Skin: No visible rashes or lesions.  Neurological: Alert and oriented to person, place, and time. Gait normal.   Psychiatric: Normal mood and affect. Calm and friendly behavior. Speech clear. Normal judgement and insight.         Assessment & Plan:       Assessment & Plan      1. Grieving family  Acute.  Unstable.  - Reports feeling unmotivated and experiencing symptoms since his mother's passing.  - Referral to a therapist will be made to help cope with emotions.  - Encouraged to maintain a positive outlook, engage in social activities, and " incorporate regular exercise into his routine. Medication may be considered if symptoms worsen.  - Referral to Psychology    2. Class 3 severe obesity with serious comorbidity and body mass index (BMI) of 40.0 to 44.9 in adult, unspecified obesity type  Chronic, established condition.  Unstable.  Current BMI is 43.98.  - Since he has GALILEA, he is a good candidate for Zepbound injections for weight loss.  He would like to trial Zepbound.  Denies personal history of pancreatitis and retinopathy.  Denies family history of thyroid carcinoma and multiple endocrine neoplasia syndrome.  - Prescription for Zepbound injections will be sent to [facility] pharmacy.  - Advised to start with the lowest dose of 2.5 mg once a week and can request a dose increase after four weeks if tolerated well.  - Potential side effects, including nausea, vomiting, diarrhea, stomach pain, and heartburn, discussed.  - Advised to schedule an eye exam a few months after starting the injections due to potential risk of retinopathy.  - Regular exercise and a healthy diet recommended to maximize benefits of the injections.  -Recommend follow-up in 3 months or sooner if needed.  - Tirzepatide-Weight Management (ZEPBOUND) 2.5 MG/0.5ML Solution Auto-injector; Inject 2.5 mg under the skin every 7 days.  Dispense: 2 mL; Refill: 1    3. GALILEA (obstructive sleep apnea)  Chronic, established condition.  Stable.  Recommend to continue with CPAP.  Patient will trial Zepbound as stated above.  - Tirzepatide-Weight Management (ZEPBOUND) 2.5 MG/0.5ML Solution Auto-injector; Inject 2.5 mg under the skin every 7 days.  Dispense: 2 mL; Refill: 1    4. Prediabetes  Chronic, established condition.  Unstable.  A1c increased from 5.9 to 6.4.  Patient will trial Zepbound as stated above.  Recommend to continue with metformin 1000 mg twice daily.  - Tirzepatide-Weight Management (ZEPBOUND) 2.5 MG/0.5ML Solution Auto-injector; Inject 2.5 mg under the skin every 7 days.  Dispense:  2 mL; Refill: 1    5. Elevated ALT measurement  Acute. Stable. ALT is 60. AST is normal.  Patient has history of hepatic steatosis.  Patient denies heavy alcohol consumption.  Recommend weight loss and avoiding alcohol.  Will monitor.    6. Chronic gout of multiple sites, unspecified cause  Chronic, established condition.  Well-controlled.  Patient denies recent flares.  - Uric acid levels have improved from 10.7 to 7.6 but still need to be under 6.  - Advised to continue taking allopurinol 100mg daily.     7. Essential hypertension  Chronic, established condition.  Well-controlled.  - Blood pressure readings are within the normal range.  - Advised to continue taking current blood pressure medication which includes losartan 50 mg daily, spironolactone 25 mg daily, carvedilol 25 mg twice a day, and amlodipine 10 mg daily.  - Follow-up appointment with the vascular medicine as scheduled on 05/28/2025 at 10:40 AM and an echocardiogram scheduled for today.      8. Hepatic steatosis  Chronic, established condition.  Stable.  Patient was evaluated by GI at [facility] and completed endoscopy and colonoscopy.  Will request records.  Recommend to follow-up with GI as recommended.       A total of  40 minutes was spent today reviewing chart, assessing and examining the patient, ordering tests, and documenting. None of which was spent performing separately billing procedures or ancillary services.     Diagnosis and treatment plan explained to pt. Counseled pt on new medication(s) and potential side effects. Pt agreed with treatment plan and verbalized understanding.    Return in about 3 months (around 8/2/2025) for weight check.     Please note that this dictation was created using voice recognition software. I have made every reasonable attempt to correct obvious errors, but I expect that there are errors of grammar and possibly content that I did not discover before finalizing the note.    Carmencita Harris  BONITA  [facility]

## 2025-05-08 NOTE — Clinical Note
REFERRAL APPROVAL NOTICE         Sent on May 8, 2025                   Gabriel Gallo  [address] [address]                   Dear Mr. Gallo,    After a careful review of the medical information and benefit coverage, [facility] has processed your referral. See below for additional details.    If applicable, you must be actively enrolled with your insurance for coverage of the authorized service. If you have any questions regarding your coverage, please contact your insurance directly.    REFERRAL INFORMATION   Referral #:  [id number]  Referred-To Provider    Referred-By Provider:  Psychologist    Carmencita Harris P.A.-C.   [facility]      [address] [place] [address] [place] [address]  [phone] [address]  [facility] [address]  [phone]    Referral Start Date:  05/02/2025  Referral End Date:   05/02/2026             SCHEDULING  If you do not already have an appointment, please call [phone] to make an appointment.     MORE INFORMATION  If you do not already have a [employer] account, sign up at: [email or web address]  You can access your medical information, make appointments, see lab results, billing information, and more.  If you have questions regarding this referral, please contact  the [facility] Referrals department at:             [phone]. Monday - Friday 8:00AM - 5:00PM.     Sincerely,    [facility]

## 2025-05-17 DIAGNOSIS — M1A.09X0 CHRONIC GOUT OF MULTIPLE SITES, UNSPECIFIED CAUSE: ICD-10-CM

## 2025-05-19 RX ORDER — ALLOPURINOL 100 MG/1
200 TABLET ORAL DAILY
Qty: 180 TABLET | Refills: 0 | Status: SHIPPED | OUTPATIENT
Start: 2025-05-19

## 2025-05-19 NOTE — TELEPHONE ENCOUNTER
Received request via: Pharmacy    Was the patient seen in the last year in this department? Yes    Does the patient have an active prescription (recently filled or refills available) for medication(s) requested? No    Pharmacy Name: [facility]    Does the patient have longterm Plus and need 100-day supply? (This applies to ALL medications) Patient does not have SCP

## 2025-05-28 ENCOUNTER — OFFICE VISIT (OUTPATIENT)
Dept: VASCULAR LAB | Facility: MEDICAL CENTER | Age: 35
End: 2025-05-28
Attending: NURSE PRACTITIONER
Payer: COMMERCIAL

## 2025-05-28 VITALS
BODY MASS INDEX: 42.82 KG/M2 | DIASTOLIC BLOOD PRESSURE: 77 MMHG | SYSTOLIC BLOOD PRESSURE: 124 MMHG | HEIGHT: 65 IN | WEIGHT: 257 LBS | HEART RATE: 76 BPM

## 2025-05-28 DIAGNOSIS — E78.1 HYPERTRIGLYCERIDEMIA: ICD-10-CM

## 2025-05-28 DIAGNOSIS — E78.5 DYSLIPIDEMIA: ICD-10-CM

## 2025-05-28 DIAGNOSIS — R73.03 PREDIABETES: ICD-10-CM

## 2025-05-28 DIAGNOSIS — I10 ESSENTIAL HYPERTENSION: Primary | ICD-10-CM

## 2025-05-28 PROCEDURE — 99214 OFFICE O/P EST MOD 30 MIN: CPT | Performed by: NURSE PRACTITIONER

## 2025-05-28 PROCEDURE — 99212 OFFICE O/P EST SF 10 MIN: CPT

## 2025-05-28 PROCEDURE — 3078F DIAST BP <80 MM HG: CPT | Performed by: NURSE PRACTITIONER

## 2025-05-28 PROCEDURE — 3074F SYST BP LT 130 MM HG: CPT | Performed by: NURSE PRACTITIONER

## 2025-05-28 ASSESSMENT — FIBROSIS 4 INDEX: FIB4 SCORE: 0.7

## 2025-05-28 NOTE — PROGRESS NOTES
Resistant Hypertension Follow Up Visit  05/28/2025    Assessment / Plan:   1. Blood Pressure Control:  ACC/AHA (2017) goal <130/80  Home BP at goal:  not taking much recently   Office BP at goal: yes  Restarted all meds, tolerating  No albuminuria on recent labs  Plan:   - Continue/resume home BP monitoring, reviewed correct technique and given handout at this visit  - Under reasonable control previously on ABPM; could consider rpt in future    2. Work up of Secondary Causes of Resistant Hypertension:   Renovascular HTN: Renal artery duplex with no evidence or MEGAN (2015), but may not be best test for potential FMD  Primary Aldosteronism: Excluded ARR normal, No adrenal adenoma noted on previous imaging  Thyroid Function: Excluded - TSH WNL 9/9/17  Obstructive Sleep Apnea: Postive - on CPAP, doing well  Pheochromocytoma: Excluded 24 hour urine normal July 2014  Instrinsic renal disease - normal GFR and kidneys unremarkable appearing on sono  Coarctation - not seen on previous imaging/angiogram  Recommendations At This Visit:   - consider MRA renal arteries in future if BP becomes more difficult to control    3. Medication Use / Adherence:  Assessment: compliant     Recommendations: Instructed to Continue Taking All Medications As Prescribed         4. End Organ Damage:   Left Ventricular Hypertrophy: Present on  Echocardiogram Date: 2015  Albuminuria: none 5/2021  Renal Function: Chronic Kidney Disease  Stage 2 at worst (GFR >60)  Established Cardiovascular Disease: None    5. Lifestyle Recommendations:    Smoking: continued complete avoidance of all tobacco products     Physical Activity: Been a really difficult time because his mom recently passed away from [place].  Encouraged more healthy activity when he is able to increase exercise and weight loss.  He wants to exercise more and realizes the benefits.  Encouraged 150min/week for cardiovascular benefit.  Encouraged adding small activities to his routine that he can  build on later-- walking 20-30 min per day at work and/or at home. Continue to review and discuss at future appts    Weight Management and Nutrition: Down a few lbs! Zepbound was denied by insurance- unfortunately. Dietary plan was discussed with patient at this visit including continued DASH, low sodium diet.  Again reviewed diet, focusing on reducing sugars/carbs, and focusing on lean proteins and veggies.  Increase hydration.  Continue to review at future appts    6. Standard HTN Pharmacotherapy:  ACEI/ARB:  continue losartan 50mg daily  Thiazide Type Diuretic: Avoid given gout and hypokalemia  Calcium Channel Blocker (CCB): continue amlodipine 10mg daily    7. Additional Agents:  Beta Blocker - continue carvedilol 25mg BID   Mineralocorticoid Receptor Antagonist (MRA) - continue spironolactone 25 mg daily   Peripheral alpha-blockers:  Not indicated at this time   Central alpha-agonists:  Not indicated   Direct vasodilators:  Not indicated   Other:  None      8. Other CV Risk Factors:   Lipids - ASCVD risk >10%  LDLP # and small LDLP well control  TG elevated but improved  Otherwise at goal   Vit D very low   Plan:  - Continue atorvastatin 5mg daily for now  - work on reducing TG in diet; again reviewed   - Restart vit D as directed by PCP   - Recheck lipids prior to next appt      Prediabetes, pmhx of borderline T2D (highest a1c = 6.6)  Lab Results   Component Value Date    HBA1C 6.4 (H) 04/19/2025      Lab Results   Component Value Date/Time    MALBCRT 20 12/26/2023 11:26 AM    MICROALBUR 2.9 12/26/2023 11:26 AM   Reviewed sugar/carb reduction  A1C recently increased from 6.0 to 6.4- pt reports he ran out of 1000mg tablets of metformin and was taking an older prescription at a lower dosage. He got a refill from his PCP and is now back on 1000mg.  Plan:  - encouraged more healthy diet, weight reduction, daily physical activity - reviewed  - continue metformin 1000mg BID to slow or offset progression to T2D as  per DPP trial   - recommmend for routine care with PCP (or endocrine) to include regular A1c monitoring, annual albumin/creatinine ratio (ACR), annual diabetic retinopathy screening, foot exams, annual flu vaccine, and updates to pneumonia vaccines as appropriate   - A1C - prior to next appt    9. 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 Ding.  LFTs stable   - defer all further w/u and management to GI and PCP    Instructed to follow-up with PCP for remainder of adult medical needs: yes - needs to establish with new PCP - given scheduling number to call.   We will partner with other providers in the management of established vascular disease and cardiometabolic risk factors.    Studies Ordered At This Visit:   None   Blood Work to Be Obtained Prior to Next Visit:  as below   Follow Up: 6 months     Diagnosis:  1. Essential hypertension  Comp Metabolic Panel      2. Hypertriglyceridemia  APOLIPOPROTEIN B    Lipid Profile    VITAMIN D,25 HYDROXY (DEFICIENCY)      3. Prediabetes  HEMOGLOBIN A1C      4. Dyslipidemia  Lipoprotein (a)         History of Present Illness:   Medina Gallo is a 35 yo male seen for f/u of hypertension, dyslidipidemia, [id number], gout and GALILEA    His mom recently passed away from cancer and it has been a difficult time  He is doing ok and has good family support     HTN:  Current HTN concerns:  no concerns, doing well  Restarted all meds, tolerating  HAs have resolved since restarting meds    Did labs - reviewed  ADRs: No  HTN sx:  No current blurred or changed vision, chest pain, shortness of breath, nausea, dizziness/vertigo or HA  Denies TIA/CVA  Home BP log: not checking recently due to stress with him mother   24h ABPM completed: could consider  "repeat   Adherence to current HTN meds: much better with improved daily work sched    Hyperlipidemia:    Stable, no current concerns  Current treatment: atorva 5mg   Myalgias? No  Other adverse drug reactions? No  Lipid profile: reviewed    GALILEA:   On CPAP    DM:   Tolerated meds  PCP increased metformin recently   Down a few lbs      Gout:   Stable. No gout flares at present    Social History     Tobacco Use    Smoking status: Never    Smokeless tobacco: Never   Vaping Use    Vaping status: Never Used   Substance Use Topics    Alcohol use: Yes     Comment: socially    Drug use: No     SOCIAL HISTORY  Weight - down 10 lbs   BMI Readings from Last 4 Encounters:   05/28/25 42.77 kg/m²   05/02/25 43.98 kg/m²   11/14/24 41.27 kg/m²   11/11/24 41.90 kg/m²      No smoking      Objective:   Allergies:  Patient has no known allergies.    /77 (BP Location: Left arm, Patient Position: Sitting, BP Cuff Size: Large adult)   Pulse 76   Ht 1.651 m (5' 5\")   Wt 117 kg (257 lb)   BMI 42.77 kg/m²     BP Readings from Last 4 Encounters:   05/28/25 124/77   05/02/25 122/78   11/14/24 125/87   11/11/24 122/88      Body mass index is 42.77 kg/m².      Physical Exam  Vitals reviewed.   Constitutional:       General: He is not in acute distress.     Appearance: He is well-developed. He is obese. He is not diaphoretic.   HENT:      Head: Normocephalic and atraumatic.   Eyes:      General: No scleral icterus.  Cardiovascular:      Rate and Rhythm: Normal rate and regular rhythm.      Pulses: Normal pulses.      Heart sounds: Normal heart sounds. No murmur heard.  Pulmonary:      Effort: Pulmonary effort is normal. No respiratory distress.      Breath sounds: Normal breath sounds. No wheezing or rales.   Musculoskeletal:         General: No swelling. Normal range of motion.      Right lower leg: No edema.      Left lower leg: No edema.   Skin:     General: Skin is warm and dry.      Coloration: Skin is not pale.   Neurological:      " General: No focal deficit present.      Mental Status: He is alert and oriented to person, place, and time.      Motor: No weakness.      Coordination: Coordination normal.      Gait: Gait normal.   Psychiatric:         Mood and Affect: Mood normal.         Behavior: Behavior normal. Behavior is cooperative.         Thought Content: Thought content normal.        Accessory Clinical Findings:   Echocardiogram:  Results for orders placed or performed during the hospital encounter of 07/28/15   ECHOCARDIOGRAM COMP W/O CONT   Result Value Ref Range    Eject.Frac. MOD BP 59.37     Eject.Frac. MOD 4C 62.42     Eject.Frac. MOD 2C 60.22       Lab Results   Component Value Date    CHOLSTRLTOT 85 (L) 11/09/2024    LDL 26 11/09/2024    HDL 24 (A) 11/09/2024    TRIGLYCERIDE 174 (H) 11/09/2024      Lab Results   Component Value Date    HBA1C 6.4 (H) 04/19/2025      Lab Results   Component Value Date    SODIUM 137 11/09/2024    POTASSIUM 4.1 11/09/2024    CHLORIDE 101 11/09/2024    CO2 22 11/09/2024    GLUCOSE 108 (H) 11/09/2024    BUN 16 11/09/2024    CREATININE 1.15 11/09/2024     Lab Results   Component Value Date    URCREAT 143.59 12/26/2023    MICROALBUR 2.9 12/26/2023    MALBCRT 20 12/26/2023        Multiple imaging studies and lab reports were available in EMR and reviewed at today's visit    TIMBO Bar.

## 2025-06-26 DIAGNOSIS — I10 ESSENTIAL HYPERTENSION: ICD-10-CM

## 2025-06-26 DIAGNOSIS — E78.5 DYSLIPIDEMIA: ICD-10-CM

## 2025-06-26 RX ORDER — ATORVASTATIN CALCIUM 10 MG/1
TABLET, FILM COATED ORAL
Qty: 45 TABLET | Refills: 3 | Status: SHIPPED | OUTPATIENT
Start: 2025-06-26

## 2025-06-26 RX ORDER — LOSARTAN POTASSIUM 50 MG/1
50 TABLET ORAL DAILY
Qty: 100 TABLET | Refills: 3 | Status: SHIPPED | OUTPATIENT
Start: 2025-06-26

## 2025-06-26 RX ORDER — CARVEDILOL 25 MG/1
25 TABLET ORAL 2 TIMES DAILY WITH MEALS
Qty: 180 TABLET | Refills: 3 | Status: SHIPPED | OUTPATIENT
Start: 2025-06-26

## 2025-06-26 NOTE — TELEPHONE ENCOUNTER
Received request via: Pharmacy    Was the patient seen in the last year in this department? Yes    Does the patient have an active prescription (recently filled or refills available) for medication(s) requested? No    [id number]    Does the patient have nursing home Plus and need 100-day supply? (This applies to ALL medications) Patient does not have SCP

## 2025-08-04 ENCOUNTER — APPOINTMENT (OUTPATIENT)
Dept: MEDICAL GROUP | Facility: IMAGING CENTER | Age: 35
End: 2025-08-04
Payer: COMMERCIAL

## 2025-08-25 DIAGNOSIS — M1A.09X0 CHRONIC GOUT OF MULTIPLE SITES, UNSPECIFIED CAUSE: ICD-10-CM

## 2025-08-25 RX ORDER — ALLOPURINOL 100 MG/1
200 TABLET ORAL DAILY
Qty: 180 TABLET | Refills: 3 | Status: SHIPPED | OUTPATIENT
Start: 2025-08-25

## 2025-08-29 ENCOUNTER — OFFICE VISIT (OUTPATIENT)
Dept: MEDICAL GROUP | Facility: IMAGING CENTER | Age: 35
End: 2025-08-29
Payer: COMMERCIAL

## 2025-08-29 ENCOUNTER — TELEPHONE (OUTPATIENT)
Dept: MEDICAL GROUP | Facility: IMAGING CENTER | Age: 35
End: 2025-08-29

## 2025-08-29 VITALS
RESPIRATION RATE: 14 BRPM | HEIGHT: 65 IN | OXYGEN SATURATION: 97 % | TEMPERATURE: 97.8 F | DIASTOLIC BLOOD PRESSURE: 88 MMHG | HEART RATE: 76 BPM | SYSTOLIC BLOOD PRESSURE: 126 MMHG | WEIGHT: 258 LBS | BODY MASS INDEX: 42.99 KG/M2

## 2025-08-29 DIAGNOSIS — I10 ESSENTIAL HYPERTENSION: ICD-10-CM

## 2025-08-29 DIAGNOSIS — E66.813 CLASS 3 SEVERE OBESITY WITH SERIOUS COMORBIDITY AND BODY MASS INDEX (BMI) OF 40.0 TO 44.9 IN ADULT, UNSPECIFIED OBESITY TYPE: Primary | ICD-10-CM

## 2025-08-29 DIAGNOSIS — G47.33 OSA (OBSTRUCTIVE SLEEP APNEA): ICD-10-CM

## 2025-08-29 DIAGNOSIS — Z23 ENCOUNTER FOR ADMINISTRATION OF VACCINE: ICD-10-CM

## 2025-08-29 ASSESSMENT — FIBROSIS 4 INDEX: FIB4 SCORE: 0.72

## (undated) DEVICE — SPONGE GAUZE NON-STERILE 4X4 - (2000/CA 10PK/CA)

## (undated) DEVICE — BASIN EMESIS DISP. - (250/CA)

## (undated) DEVICE — ELECTRODE 850 FOAM ADHESIVE - HYDROGEL RADIOTRNSPRNT (50/PK)

## (undated) DEVICE — SENSOR SPO2 NEO LNCS ADHESIVE (20/BX) SEE USER NOTES

## (undated) DEVICE — SUCTION INSTRUMENT YANKAUER BULBOUS TIP W/O VENT (50EA/CA)

## (undated) DEVICE — CANISTER SUCTION RIGID RED 1500CC (40EA/CA)

## (undated) DEVICE — LACTATED RINGERS INJ 1000 ML - (14EA/CA 60CA/PF)

## (undated) DEVICE — SYRINGE SAFETY 5 ML 18 GA X 1-1/2 BLUNT LL (100/BX 4BX/CA)

## (undated) DEVICE — SYRINGE SAFETY 10 ML 18 GA X 1 1/2 BLUNT LL (100/BX 4BX/CA)

## (undated) DEVICE — BITE BLOCK ADULT 60FR (100EA/CA)

## (undated) DEVICE — MASK ANESTHESIA ADULT  - (100/CA)

## (undated) DEVICE — TUBE SUCTION YANKAUER  1/4 X 6FT (20EA/CA)"

## (undated) DEVICE — NEPTUNE 4 PORT MANIFOLD - (20/PK)

## (undated) DEVICE — GOWN SURGEONS LARGE - (32/CA)

## (undated) DEVICE — GLOVE, LITE (PAIR)

## (undated) DEVICE — CATHETER IV SAFETY 20 GA X 1-1/4 (50/BX)

## (undated) DEVICE — KIT  I.V. START (100EA/CA)

## (undated) DEVICE — MASK WITH FACE SHIELD (25/BX 4BX/CA)

## (undated) DEVICE — JELLY, KY 2 0Z STERILE

## (undated) DEVICE — TUBING CLEARLINK DUO-VENT - C-FLO (48EA/CA)

## (undated) DEVICE — TUBE CONNECTING SUCTION - CLEAR PLASTIC STERILE 72 IN (50EA/CA)

## (undated) DEVICE — SYRINGE SAFETY 3 ML 18 GA X 1 1/2 BLUNT LL (100/BX 8BX/CA)

## (undated) DEVICE — SET EXTENSION WITH 2 PORTS (48EA/CA) ***PART #2C8610 IS A SUBSTITUTE*****

## (undated) DEVICE — SYRINGE DISP. 60 CC LL - (30/BX, 12BX/CA)**WHEN THESE ARE GONE ORDER #500206**